# Patient Record
Sex: MALE | Race: WHITE | NOT HISPANIC OR LATINO | Employment: OTHER | URBAN - METROPOLITAN AREA
[De-identification: names, ages, dates, MRNs, and addresses within clinical notes are randomized per-mention and may not be internally consistent; named-entity substitution may affect disease eponyms.]

---

## 2017-10-02 ENCOUNTER — OFFICE VISIT (OUTPATIENT)
Dept: ORTHOPEDICS | Facility: CLINIC | Age: 62
End: 2017-10-02
Payer: MEDICARE

## 2017-10-02 VITALS — HEIGHT: 71 IN | BODY MASS INDEX: 29.26 KG/M2 | WEIGHT: 209 LBS

## 2017-10-02 DIAGNOSIS — M25.562 PAIN IN BOTH KNEES, UNSPECIFIED CHRONICITY: Primary | ICD-10-CM

## 2017-10-02 DIAGNOSIS — M25.561 PAIN IN BOTH KNEES, UNSPECIFIED CHRONICITY: Primary | ICD-10-CM

## 2017-10-02 DIAGNOSIS — M17.11 ARTHRITIS OF KNEE, RIGHT: Primary | ICD-10-CM

## 2017-10-02 PROCEDURE — 99203 OFFICE O/P NEW LOW 30 MIN: CPT | Mod: S$GLB,,, | Performed by: ORTHOPAEDIC SURGERY

## 2017-10-02 RX ORDER — ASPIRIN AND DIPYRIDAMOLE 25; 200 MG/1; MG/1
1 CAPSULE, EXTENDED RELEASE ORAL 2 TIMES DAILY
COMMUNITY
Start: 2017-09-29 | End: 2018-03-28

## 2017-10-02 RX ORDER — PROPAFENONE HYDROCHLORIDE 150 MG/1
1 TABLET, COATED ORAL 2 TIMES DAILY
COMMUNITY
Start: 2017-08-08 | End: 2018-03-28

## 2017-10-02 RX ORDER — ESCITALOPRAM OXALATE 5 MG/1
1 TABLET ORAL DAILY
COMMUNITY
Start: 2017-09-25 | End: 2018-03-28

## 2017-10-02 RX ORDER — ATORVASTATIN CALCIUM 40 MG/1
40 TABLET, FILM COATED ORAL DAILY
COMMUNITY
End: 2022-08-02 | Stop reason: SDUPTHER

## 2017-10-02 RX ORDER — OMEPRAZOLE 20 MG/1
1 CAPSULE, DELAYED RELEASE ORAL DAILY
COMMUNITY
Start: 2017-09-29 | End: 2018-04-25

## 2017-10-08 NOTE — PROGRESS NOTES
Past Medical History:   Diagnosis Date    Depression     Hypertension        History reviewed. No pertinent surgical history.    Current Outpatient Prescriptions   Medication Sig    atorvastatin (LIPITOR) 40 MG tablet Take 40 mg by mouth once daily.    dipyridamole-aspirin 200-25 mg (AGGRENOX)  mg CM12 Take 1 capsule by mouth 2 (two) times daily.    escitalopram oxalate (LEXAPRO) 5 MG Tab Take 1 tablet by mouth once daily.    omeprazole (PRILOSEC) 20 MG capsule Take 1 capsule by mouth once daily.    propafenone (RHTHYMOL) 150 MG Tab Take 1 tablet by mouth 2 (two) times daily.     No current facility-administered medications for this visit.        Review of patient's allergies indicates:   Allergen Reactions    Pcn [penicillins]        History reviewed. No pertinent family history.    Social History     Social History    Marital status:      Spouse name: N/A    Number of children: N/A    Years of education: N/A     Occupational History    Not on file.     Social History Main Topics    Smoking status: Never Smoker    Smokeless tobacco: Never Used    Alcohol use Not on file    Drug use: Unknown    Sexual activity: Not on file     Other Topics Concern    Not on file     Social History Narrative    No narrative on file       Chief Complaint:   Chief Complaint   Patient presents with    Right Knee - Pain       Consulting Physician: Self, Aaareferral    History of present illness:    This is a 62 y.o. year old male who complains of right knee pain 5/10. Worse with use. Has had steroid and visco injections with a little improvement.    Review of Systems:    Constitution: Denies chills, fever, and sweats.  HENT: Denies headaches or blurry vision.  Cardiovascular: Denies chest pain or irregular heart beat.  Respiratory: Denies cough or shortness of breath.  Gastrointestinal: Denies abdominal pain, nausea, or vomiting.  Musculoskeletal:  Denies muscle cramps.  Neurological: Denies dizziness or  "focal weakness.  Psychiatric/Behavioral: Normal mental status.  Hematologic/Lymphatic: Denies bleeding problem or easy bruising/bleeding.  Skin: Denies rash or suspicious lesions.    Examination:    Vital Signs:    Vitals:    10/02/17 0943   Weight: 94.8 kg (209 lb)   Height: 5' 11" (1.803 m)   PainSc:   5   PainLoc: Knee       Body mass index is 29.15 kg/m².    This a well-developed, well nourished patient in no acute distress.    Alert and oriented x 3 and cooperative to examination.       Physical Exam: Right Knee Exam    Gait   Antalgic    Skin  Rash:   None  Scars:   None    Inspection  Erythema:  None  Bruising:  None  Effusion:  None  Masses:  None  Lymphadenopathy: None    Range of Motion: 0 to 130°    Medial Joint : y  Lateral Joint : n    Patellofemoral Tenderness: Yes  Patellofemoral Crepitus: Yes    Lachman:  Normal  Anterior Drawer: Normal  Posterior Drawer: Normal    Jerrica's:  Negative  Apley's:  Negative    Varus Stress:  Stable  Valgus Stress:  Stable    Strength:  5/5    Pulses:  Palpable  Sensation:  Intact          Imaging: X-rays ordered and reviewed today personally of the right knee shows degenerative changes consistent with OA.        Assessment: Arthritis of knee, right        Plan:  We discussed options and at this time he has elected to see how his knee does.      DISCLAIMER: This note may have been dictated using voice recognition software and may contain grammatical errors.     NOTE: Consult report sent to referring provider via [facility] EMR.  "

## 2018-03-28 PROBLEM — I10 ESSENTIAL HYPERTENSION: Status: ACTIVE | Noted: 2018-03-28

## 2018-03-28 PROBLEM — I48.20 CHRONIC ATRIAL FIBRILLATION: Status: ACTIVE | Noted: 2018-03-28

## 2018-03-28 PROBLEM — M15.0 PRIMARY OSTEOARTHRITIS INVOLVING MULTIPLE JOINTS: Status: ACTIVE | Noted: 2018-03-28

## 2018-03-28 PROBLEM — M15.9 PRIMARY OSTEOARTHRITIS INVOLVING MULTIPLE JOINTS: Status: ACTIVE | Noted: 2018-03-28

## 2018-03-28 PROBLEM — E78.00 HYPERCHOLESTEREMIA: Status: ACTIVE | Noted: 2018-03-28

## 2018-03-28 PROBLEM — Z95.0 PACEMAKER: Status: ACTIVE | Noted: 2018-03-28

## 2018-04-26 ENCOUNTER — HOSPITAL ENCOUNTER (OUTPATIENT)
Dept: RADIOLOGY | Facility: HOSPITAL | Age: 63
Discharge: HOME | End: 2018-04-26
Attending: NURSE PRACTITIONER
Payer: MEDICARE

## 2018-04-26 DIAGNOSIS — R17 ELEVATED BILIRUBIN: ICD-10-CM

## 2018-04-26 DIAGNOSIS — R74.8 ELEVATED ALKALINE PHOSPHATASE LEVEL: ICD-10-CM

## 2018-04-26 PROCEDURE — 76700 US EXAM ABDOM COMPLETE: CPT | Mod: TC

## 2018-04-26 PROCEDURE — 76700 US EXAM ABDOM COMPLETE: CPT | Mod: 26,,, | Performed by: RADIOLOGY

## 2018-04-30 PROBLEM — R13.19 ESOPHAGEAL DYSPHAGIA: Status: ACTIVE | Noted: 2018-04-30

## 2018-04-30 PROBLEM — R17 ELEVATED BILIRUBIN: Status: ACTIVE | Noted: 2018-04-30

## 2018-04-30 PROBLEM — R93.2 ABNORMAL ULTRASOUND OF GALLBLADDER: Status: ACTIVE | Noted: 2018-04-30

## 2018-04-30 PROBLEM — K59.00 CONSTIPATION: Status: ACTIVE | Noted: 2018-04-30

## 2018-04-30 PROBLEM — K21.9 GASTROESOPHAGEAL REFLUX DISEASE: Status: ACTIVE | Noted: 2018-04-30

## 2018-05-22 PROBLEM — R93.2 ABNORMAL ULTRASOUND OF GALLBLADDER: Status: RESOLVED | Noted: 2018-04-30 | Resolved: 2018-05-22

## 2018-06-19 ENCOUNTER — HOSPITAL ENCOUNTER (EMERGENCY)
Facility: HOSPITAL | Age: 63
Discharge: HOME | End: 2018-06-19
Attending: FAMILY MEDICINE
Payer: MEDICARE

## 2018-06-19 VITALS
BODY MASS INDEX: 31.08 KG/M2 | HEIGHT: 71 IN | RESPIRATION RATE: 20 BRPM | TEMPERATURE: 99 F | HEART RATE: 85 BPM | DIASTOLIC BLOOD PRESSURE: 80 MMHG | SYSTOLIC BLOOD PRESSURE: 142 MMHG | OXYGEN SATURATION: 94 % | WEIGHT: 222 LBS

## 2018-06-19 DIAGNOSIS — R11.10 NON-INTRACTABLE VOMITING, PRESENCE OF NAUSEA NOT SPECIFIED, UNSPECIFIED VOMITING TYPE: Primary | ICD-10-CM

## 2018-06-19 LAB
ALBUMIN SERPL BCP-MCNC: 3.5 G/DL
ALP SERPL-CCNC: 141 U/L
ALT SERPL W/O P-5'-P-CCNC: 30 U/L
ANION GAP SERPL CALC-SCNC: 7 MMOL/L
AST SERPL-CCNC: 23 U/L
BASOPHILS # BLD AUTO: 0.02 K/UL
BASOPHILS NFR BLD: 0.3 %
BILIRUB SERPL-MCNC: 0.9 MG/DL
BUN SERPL-MCNC: 18 MG/DL
CALCIUM SERPL-MCNC: 8.6 MG/DL
CHLORIDE SERPL-SCNC: 104 MMOL/L
CO2 SERPL-SCNC: 26 MMOL/L
CREAT SERPL-MCNC: 0.9 MG/DL
DIFFERENTIAL METHOD: ABNORMAL
EOSINOPHIL # BLD AUTO: 0 K/UL
EOSINOPHIL NFR BLD: 0.1 %
ERYTHROCYTE [DISTWIDTH] IN BLOOD BY AUTOMATED COUNT: 12.7 %
EST. GFR  (AFRICAN AMERICAN): >60 ML/MIN/1.73 M^2
EST. GFR  (NON AFRICAN AMERICAN): >60 ML/MIN/1.73 M^2
GLUCOSE SERPL-MCNC: 109 MG/DL
HCT VFR BLD AUTO: 40.1 %
HGB BLD-MCNC: 13.9 G/DL
IMM GRANULOCYTES # BLD AUTO: 0.02 K/UL
IMM GRANULOCYTES NFR BLD AUTO: 0.3 %
LYMPHOCYTES # BLD AUTO: 0.5 K/UL
LYMPHOCYTES NFR BLD: 7.3 %
MCH RBC QN AUTO: 29.8 PG
MCHC RBC AUTO-ENTMCNC: 34.7 G/DL
MCV RBC AUTO: 86 FL
MONOCYTES # BLD AUTO: 1 K/UL
MONOCYTES NFR BLD: 12.8 %
NEUTROPHILS # BLD AUTO: 5.9 K/UL
NEUTROPHILS NFR BLD: 79.2 %
NRBC BLD-RTO: 0 /100 WBC
PLATELET # BLD AUTO: 119 K/UL
PMV BLD AUTO: 9.8 FL
POTASSIUM SERPL-SCNC: 3.9 MMOL/L
PROT SERPL-MCNC: 6 G/DL
RBC # BLD AUTO: 4.67 M/UL
SODIUM SERPL-SCNC: 137 MMOL/L
WBC # BLD AUTO: 7.42 K/UL

## 2018-06-19 PROCEDURE — 96374 THER/PROPH/DIAG INJ IV PUSH: CPT

## 2018-06-19 PROCEDURE — 99284 EMERGENCY DEPT VISIT MOD MDM: CPT | Mod: 25

## 2018-06-19 PROCEDURE — 96361 HYDRATE IV INFUSION ADD-ON: CPT

## 2018-06-19 PROCEDURE — 80053 COMPREHEN METABOLIC PANEL: CPT

## 2018-06-19 PROCEDURE — 25000003 PHARM REV CODE 250: Performed by: FAMILY MEDICINE

## 2018-06-19 PROCEDURE — 63600175 PHARM REV CODE 636 W HCPCS: Performed by: FAMILY MEDICINE

## 2018-06-19 PROCEDURE — 85025 COMPLETE CBC W/AUTO DIFF WBC: CPT

## 2018-06-19 RX ORDER — ONDANSETRON 4 MG/1
4 TABLET, FILM COATED ORAL EVERY 8 HOURS PRN
Qty: 12 TABLET | Refills: 0 | Status: SHIPPED | OUTPATIENT
Start: 2018-06-19 | End: 2018-11-13

## 2018-06-19 RX ORDER — ONDANSETRON 2 MG/ML
4 INJECTION INTRAMUSCULAR; INTRAVENOUS
Status: COMPLETED | OUTPATIENT
Start: 2018-06-19 | End: 2018-06-19

## 2018-06-19 RX ADMIN — SODIUM CHLORIDE 500 ML: 0.9 INJECTION, SOLUTION INTRAVENOUS at 01:06

## 2018-06-19 RX ADMIN — ONDANSETRON 4 MG: 2 INJECTION INTRAMUSCULAR; INTRAVENOUS at 01:06

## 2018-06-19 NOTE — DISCHARGE INSTRUCTIONS
Clear liquids only for the next 24 hours, then you can advance your diet as you can tolerate it. Return to the ER if you are unable to tolerate fluids or foods. Follow up with Dr. Cerna in 4-5 days, sooner if you dont feel better.

## 2018-06-19 NOTE — ED PROVIDER NOTES
"Encounter Date: 6/19/2018       History     Chief Complaint   Patient presents with    Fatigue     States feeling fatigued and generalized weak. States sleeping in longer than normal.    Generalized Body Aches     X 4 days.    Nausea     Continuous nausea; without vomiting. Also complaining of decreased appetite.    Headache     Left sided; pressure sensation; only when changing positions     Pt states for the past couple days has had weakness and body aches. Denies fever or chills. States he had nausea last pm but no vomiting. States he has not eaten nor drank anything since last night. No recent sick contacts.           Review of patient's allergies indicates:   Allergen Reactions    Pcn [penicillins] Hives     Past Medical History:   Diagnosis Date    Depression     Glaucoma     Hypercholesteremia     Hypertension     Stroke      Past Surgical History:   Procedure Laterality Date    ATRIAL CARDIAC PACEMAKER INSERTION      CARDIAC CATHETERIZATION  10/25/2017    all arteries "patent"    CARDIAC PACEMAKER PLACEMENT  03/2017    CARDIAC PACEMAKER PLACEMENT      COLONOSCOPY  2013    Dr. Goldstein     No family history on file.  Social History   Substance Use Topics    Smoking status: Never Smoker    Smokeless tobacco: Never Used    Alcohol use Yes      Comment: Rarely     Review of Systems   Constitutional: Positive for fatigue.   HENT: Negative.    Eyes: Negative.    Respiratory: Negative.    Cardiovascular: Negative.    Gastrointestinal: Positive for nausea.   Endocrine: Negative.    Genitourinary: Negative.    Musculoskeletal: Negative.    Neurological: Positive for weakness.   Hematological: Negative.    Psychiatric/Behavioral: Negative.        Physical Exam     Initial Vitals [06/19/18 1251]   BP Pulse Resp Temp SpO2   (!) 144/80 79 20 99.3 °F (37.4 °C) 98 %      MAP       --         Physical Exam    Nursing note and vitals reviewed.  Constitutional: He appears well-developed and well-nourished. He is " not diaphoretic. No distress.   HENT:   Head: Normocephalic and atraumatic.   Eyes: Pupils are equal, round, and reactive to light.   Neck: Normal range of motion. Neck supple.   Cardiovascular: Normal rate, regular rhythm, normal heart sounds and intact distal pulses. Exam reveals no gallop and no friction rub.    No murmur heard.  Pulmonary/Chest: Breath sounds normal. No respiratory distress. He has no wheezes. He has no rhonchi. He has no rales. He exhibits no tenderness.   Abdominal: Soft. Bowel sounds are normal. He exhibits no distension. There is no tenderness. There is no rebound and no guarding.   Musculoskeletal: Normal range of motion. He exhibits no edema.   Neurological: He is alert and oriented to person, place, and time. He has normal strength.   Skin: Skin is warm and dry. Capillary refill takes less than 2 seconds.   Psychiatric: He has a normal mood and affect. His behavior is normal. Judgment and thought content normal.         ED Course   Procedures  Labs Reviewed   CBC W/ AUTO DIFFERENTIAL   COMPREHENSIVE METABOLIC PANEL          Imaging Results    None                    Labs Reviewed   CBC W/ AUTO DIFFERENTIAL - Abnormal; Notable for the following:        Result Value    Hemoglobin 13.9 (*)     Platelets 119 (*)     Lymph # 0.5 (*)     Gran% 79.2 (*)     Lymph% 7.3 (*)     All other components within normal limits   COMPREHENSIVE METABOLIC PANEL - Abnormal; Notable for the following:     Calcium 8.6 (*)     Alkaline Phosphatase 141 (*)     Anion Gap 7 (*)     All other components within normal limits              ED Course as of Jun 19 1439   Tue Jun 19, 2018   1408 Awaiting lab results  [MD]   1439 Pt feeling much better, will attempt po challenge and if tolerates will discharge.   [MD]      ED Course User Index  [MD] Lydia Fitch MD     Clinical Impression:   The encounter diagnosis was Non-intractable vomiting, presence of nausea not specified, unspecified vomiting type.                              Lydia Fitch MD  06/19/18 6723

## 2018-10-01 ENCOUNTER — TELEPHONE (OUTPATIENT)
Dept: PODIATRY | Facility: CLINIC | Age: 63
End: 2018-10-01

## 2018-10-01 NOTE — TELEPHONE ENCOUNTER
----- Message from Debbie Rahman sent at 10/1/2018  7:56 AM CDT -----  Pt is requesting a same day appointment / had injured foot / slipped and feels like he could have broken his toe ... Call [phone] (home)

## 2018-10-02 ENCOUNTER — HOSPITAL ENCOUNTER (OUTPATIENT)
Dept: RADIOLOGY | Facility: HOSPITAL | Age: 63
Discharge: HOME | End: 2018-10-02
Attending: PODIATRIST
Payer: MEDICARE

## 2018-10-02 ENCOUNTER — OFFICE VISIT (OUTPATIENT)
Dept: PODIATRY | Facility: CLINIC | Age: 63
End: 2018-10-02
Payer: MEDICARE

## 2018-10-02 ENCOUNTER — TELEPHONE (OUTPATIENT)
Dept: PODIATRY | Facility: CLINIC | Age: 63
End: 2018-10-02

## 2018-10-02 VITALS
BODY MASS INDEX: 32.76 KG/M2 | SYSTOLIC BLOOD PRESSURE: 165 MMHG | HEART RATE: 73 BPM | TEMPERATURE: 98 F | WEIGHT: 234 LBS | HEIGHT: 71 IN | DIASTOLIC BLOOD PRESSURE: 95 MMHG

## 2018-10-02 DIAGNOSIS — S99.921A INJURY, FOOT, RIGHT, INITIAL ENCOUNTER: ICD-10-CM

## 2018-10-02 DIAGNOSIS — S92.314A CLOSED NONDISPLACED FRACTURE OF FIRST METATARSAL BONE OF RIGHT FOOT, INITIAL ENCOUNTER: Primary | ICD-10-CM

## 2018-10-02 DIAGNOSIS — M89.9 SESAMOID PAIN: ICD-10-CM

## 2018-10-02 DIAGNOSIS — S92.314A CLOSED NONDISPLACED FRACTURE OF FIRST METATARSAL BONE OF RIGHT FOOT, INITIAL ENCOUNTER: ICD-10-CM

## 2018-10-02 PROCEDURE — 73630 X-RAY EXAM OF FOOT: CPT | Mod: TC,FY,RT

## 2018-10-02 PROCEDURE — 99213 OFFICE O/P EST LOW 20 MIN: CPT | Mod: PBBFAC,25,PN | Performed by: PODIATRIST

## 2018-10-02 PROCEDURE — 99999 PR PBB SHADOW E&M-EST. PATIENT-LVL III: CPT | Mod: PBBFAC,,, | Performed by: PODIATRIST

## 2018-10-02 PROCEDURE — 73630 X-RAY EXAM OF FOOT: CPT | Mod: 26,RT,, | Performed by: RADIOLOGY

## 2018-10-02 PROCEDURE — 99214 OFFICE O/P EST MOD 30 MIN: CPT | Mod: S$PBB,,, | Performed by: PODIATRIST

## 2018-10-02 NOTE — TELEPHONE ENCOUNTER
----- Message from Nilay Cox DPM sent at 10/2/2018 12:55 PM CDT -----  Call and advise Xray doesn't show fx of bone but one of the spurs around the big toe joint fractured it best that he wear the boot for the next 2 weeks until I see him for F/U give him appt for 2 weeks. TY

## 2018-10-02 NOTE — TELEPHONE ENCOUNTER
Advised pt of xray results and tx plan. Pt verbalized understanding but would like for you to call him directly when you are finished with pt's. He would not tell me what this was concerning.

## 2018-10-02 NOTE — TELEPHONE ENCOUNTER
I told him that and he states if he is a good doctor he will call his pt back at their request. He will not tell me what he wants. Advised him he could ask at f/u appt if he was not going to tell me what his questions were so I could adivse the doctor and he hung up

## 2018-10-07 NOTE — PROGRESS NOTES
Subjective:       Patient ID: Elmer Cunha is a 63 y.o. male.    Chief Complaint: Follow-up; Foot Pain; and Foot Problem    Patient presents today with an injury to his right foot he states that while walking in his shed he slipped rolling his toes underneath his foot on the right side he states this happened last Thursday the pain has gotten better but still very swollen and tender and he is concerned that he may have broken something.  Patient states he did take Tylenol he did ice and elevate the area.  HPI  Review of Systems   Musculoskeletal: Positive for arthralgias, gait problem and joint swelling.   All other systems reviewed and are negative.      Objective:      Physical Exam   Constitutional: He appears well-developed and well-nourished.   Cardiovascular:   Pulses:       Dorsalis pedis pulses are 1+ on the right side, and 1+ on the left side.        Posterior tibial pulses are 1+ on the right side, and 1+ on the left side.   Musculoskeletal: He exhibits edema and tenderness.        Right foot: There is decreased range of motion.   Feet:   Right Foot:   Protective Sensation: 4 sites tested. 4 sites sensed.   Skin Integrity: Positive for erythema and warmth.   Left Foot:   Protective Sensation: 4 sites tested. 4 sites sensed.   Neurological: He is alert.   Skin: Skin is warm. Capillary refill takes 2 to 3 seconds.   Psychiatric: He has a normal mood and affect. His behavior is normal. Judgment and thought content normal.   Nursing note and vitals reviewed.      On evaluation patient has considerable erythema and edema of the right forefoot the patient has pain surrounding the 1st and 2nd ray there is significant discomfort noted in the sesamoid region right inflammation is surrounding the 1st MPJ with very limited palpation and range of motion due to discomfort there is concern for possible fracture in this area.  Patient has increased sensitivity in these areas with noted inflammation.  No skin breaks no  active signs of infection noted.  Assessment:       1. Closed nondisplaced fracture of first metatarsal bone of right foot, initial encounter    2. Sesamoid pain    3. Injury, foot, right, initial encounter        Plan:         Following extensive evaluation patient had an injury last Thursday where he slipped and his shed rolling the toes on his right foot underneath this foot this is been very swollen red and painful ever since that time very sensitive it has been difficult for the patient to wear shoe he has taken Tylenol he has iced and elevate while at has gotten a little bit better he still having significant pain upon weight-bearing.  Following examination I have concern for possible fracture to include the metatarsals or sesamoid bones I have ordered x-rays on the area the patient is going to go get x-rays taken we will contact the patient once I have had a chance to review the x-rays however I am recommending putting the patient in a fracture boot either way with or without a fracture I feel it would be best for the patient to offload pressure from the area allow it to settle down.  I have recommended continued ice elevation.   Patient will get the boot and start using it at all times of weight-bearing immediately I will plan to see the patient for follow-up pending the x-ray results I did subsequently evaluate the x-ray there was no obvious sign fracture of the metatarsals or sesamoid my moist however there is severe degenerative arthritic changes encompassing the 1st MPJ there is a fractured portion of the spur at the dorsal aspect of the 1st metatarsal head right.  Patient was contacted advised there is no metatarsal fracture or sesamoid fracture however there is a fractured spur overlying the big toe joint I advised the patient that it is likely that the spur fracture with this injury at this point I do want the patient to continue to wear the fracture boot as ordered.  Ice and elevation recommended my  nurse had contacted the patient advising the patient as to the results of the x-ray patient subsequently on the phone upon the patient and did not make a follow-up indicating he felt that the doctor should be contacting him directly not the nurse.  Total face-to-face time including discussion evaluation treatment equaled 25 min.

## 2018-10-15 ENCOUNTER — TELEPHONE (OUTPATIENT)
Dept: PODIATRY | Facility: CLINIC | Age: 63
End: 2018-10-15

## 2018-10-15 NOTE — TELEPHONE ENCOUNTER
----- Message from Abida Reyes sent at 10/15/2018 10:25 AM CDT -----  Contact: patient  Patient was supposed to be scheduled for a 2 week follow up ([place] office). Requesting to be seen this week. Please call [phone]

## 2018-10-23 ENCOUNTER — OFFICE VISIT (OUTPATIENT)
Dept: PODIATRY | Facility: CLINIC | Age: 63
End: 2018-10-23
Payer: MEDICARE

## 2018-10-23 VITALS
HEART RATE: 78 BPM | TEMPERATURE: 98 F | BODY MASS INDEX: 32.2 KG/M2 | HEIGHT: 71 IN | DIASTOLIC BLOOD PRESSURE: 85 MMHG | WEIGHT: 230 LBS | SYSTOLIC BLOOD PRESSURE: 162 MMHG

## 2018-10-23 DIAGNOSIS — M19.079 OSTEOARTHRITIS OF ANKLE AND FOOT, UNSPECIFIED LATERALITY: Primary | ICD-10-CM

## 2018-10-23 DIAGNOSIS — M20.21 HALLUX RIGIDUS OF RIGHT FOOT: ICD-10-CM

## 2018-10-23 PROCEDURE — 99999 PR PBB SHADOW E&M-EST. PATIENT-LVL IV: CPT | Mod: PBBFAC,,, | Performed by: PODIATRIST

## 2018-10-23 PROCEDURE — 99214 OFFICE O/P EST MOD 30 MIN: CPT | Mod: S$PBB,,, | Performed by: PODIATRIST

## 2018-10-23 PROCEDURE — 99214 OFFICE O/P EST MOD 30 MIN: CPT | Mod: PBBFAC,PN | Performed by: PODIATRIST

## 2018-10-23 RX ORDER — METOPROLOL TARTRATE 25 MG/1
TABLET, FILM COATED ORAL
Refills: 3 | Status: ON HOLD | COMMUNITY
Start: 2018-08-17 | End: 2019-02-13 | Stop reason: HOSPADM

## 2018-10-23 RX ORDER — AMLODIPINE BESYLATE 5 MG/1
5 TABLET ORAL DAILY
Refills: 11 | COMMUNITY
Start: 2018-10-09

## 2018-10-23 RX ORDER — TRAVOPROST 0.04 MG/ML
SOLUTION/ DROPS OPHTHALMIC
Refills: 3 | COMMUNITY
Start: 2018-10-09 | End: 2020-02-26

## 2018-10-23 RX ORDER — ROPINIROLE 0.5 MG/1
TABLET, FILM COATED ORAL
Refills: 0 | Status: ON HOLD | COMMUNITY
Start: 2018-10-15 | End: 2018-11-16 | Stop reason: CLARIF

## 2018-10-24 ENCOUNTER — TELEPHONE (OUTPATIENT)
Dept: PODIATRY | Facility: CLINIC | Age: 63
End: 2018-10-24

## 2018-10-24 NOTE — TELEPHONE ENCOUNTER
----- Message from Stefany Shieldsza sent at 10/24/2018  4:02 PM CDT -----  Contact: self [phone]  He is having sharp pain in his right foot.  He wants to know if you could prescribe pain medication.  He also wants to know if you could possibly perform his surgery sooner than 11/15.  Please call him.  Thank you!

## 2018-10-24 NOTE — TELEPHONE ENCOUNTER
C/o sharp pain big toe and feels like it runs across all its toes then up his whole right leg. He states the pain has been extremely bad today. Wants to know if you can do surgery on 11/8 or 11/9.  Can you call him something in for pain?  Uses [facility].

## 2018-10-25 ENCOUNTER — TELEPHONE (OUTPATIENT)
Dept: PODIATRY | Facility: CLINIC | Age: 63
End: 2018-10-25

## 2018-10-25 RX ORDER — HYDROCODONE BITARTRATE AND ACETAMINOPHEN 5; 325 MG/1; MG/1
1 TABLET ORAL 3 TIMES DAILY
Qty: 42 TABLET | Refills: 0 | Status: SHIPPED | OUTPATIENT
Start: 2018-10-25 | End: 2018-11-08

## 2018-10-25 NOTE — TELEPHONE ENCOUNTER
----- Message from Silvana Alfaro sent at 10/25/2018 12:37 PM CDT -----  Contact: self  Patient has questions about the way the prescription of Norco was dispensed and charged. Please call patient at [phone]. Thanks!

## 2018-10-25 NOTE — TELEPHONE ENCOUNTER
Spoke to pt he states he had to pay for half of the pain med rx that was sent to pharmacy. Advised pt it may be because insurance would only pay for a portion of the rx. Pt states that is what the pharmacy advised him.

## 2018-10-25 NOTE — TELEPHONE ENCOUNTER
Advised patient no earlier surgery date is available. Advised of pain medication sent to [facility].  Patient verbalized understanding.

## 2018-10-26 ENCOUNTER — TELEPHONE (OUTPATIENT)
Dept: PODIATRY | Facility: CLINIC | Age: 63
End: 2018-10-26

## 2018-10-26 NOTE — TELEPHONE ENCOUNTER
Pt is still in a lot of pain. He said the pain medicine makes him woozy and then sleepy.  He complains about it shooting all over his first and then up his leg. He doesn't want anymore pain medicine but does want to have surgery sooner.  If someone cancels can he get bumped up? Advised pt I would mention it again to you.

## 2018-10-26 NOTE — TELEPHONE ENCOUNTER
----- Message from Kunal Oakes sent at 10/26/2018 10:49 AM CDT -----  Contact: pt  Pt is requesting to reschedule their appointment.    Date of current appointment: 11.16.18  Reason for reschedule: pt in to much pain / states meds arent working  Additional information: pt is requesting to move the surgery up as soon as possible    Call back: [phone]  thanks

## 2018-10-28 NOTE — PROGRESS NOTES
Subjective:       Patient ID: Elmer Cunha is a 63 y.o. male.    Chief Complaint: No chief complaint on file.      Patient presents today for follow-up injury of his big toe joint he states he still has severe pain and discomfort even though the boot does help he still having a lot of discomfort and he is getting a shooting sensation up his big toe up his foot into his leg.  Patient presents today to discuss previous x-ray results.  HPI  Review of Systems   Musculoskeletal: Positive for arthralgias, gait problem and joint swelling.   All other systems reviewed and are negative.      Objective:      Physical Exam   Constitutional: He appears well-developed and well-nourished.   Cardiovascular:   Pulses:       Dorsalis pedis pulses are 1+ on the right side, and 1+ on the left side.        Posterior tibial pulses are 1+ on the right side, and 1+ on the left side.   Musculoskeletal: He exhibits edema and tenderness.        Right foot: There is decreased range of motion.   Feet:   Right Foot:   Protective Sensation: 4 sites tested. 4 sites sensed.   Skin Integrity: Positive for erythema and warmth.   Left Foot:   Protective Sensation: 4 sites tested. 4 sites sensed.   Neurological: He is alert.   Skin: Skin is warm. Capillary refill takes 2 to 3 seconds.   Psychiatric: He has a normal mood and affect. His behavior is normal. Judgment and thought content normal.   Nursing note and vitals reviewed.      On evaluation patient has considerable erythema and edema of the right forefoot the patient has pain surrounding the 1st and 2nd ray there is significant discomfort noted in the sesamoid region right inflammation is surrounding the 1st MPJ with very limited palpation and range of motion due to discomfort.  Patient has increased sensitivity in these areas with noted inflammation.  No skin breaks no active signs of infection noted.  Assessment:       1. Osteoarthritis of ankle and foot, unspecified laterality    2. Hallux  rigidus of right foot        Plan:           Following evaluation x-rays reviewed in detail and discussed in detail with the patient today patient has significant degenerative changes encompassing the 1st MPJ of the patient's right foot there are fractured spurs overlying the dorsal aspect of the joint likely fractured at the most recent injury which is cause continued inflammation severe pain in the right foot patient was dispensed samples of Flector patch today to use over the area to settle down the inflammation I want to stay in the fracture boot at this time.  I did discuss treatment options with the patient I discussed a steroid injection however I do not feel this is going to give the patient the amount of relief that he is going need.  I discussed surgical intervention of a fusion of the 1st MPJ versus doing an arthroplasty I am recommending the arthroplasty procedure this is a less invasive less aggressive procedure I believe this will provide the patient significant relief from his discomfort with a shorter recovery patient had also indicated he would like to have a nail avulsion of the right great toe at the same time because the nail has become loose and is lifting off he is afraid he is going to catch it on something and ripped it off and cause an infection I have advised him certainly we can do this at the very same time we have scheduled the patient for surgery for November 16th patient is seeing his cardiologist next week and will have to get medical clearance from his cardiologist prior to surgery.  Patient understands this I will see the pre patient for a preoperative consultation prior to surgery.  Subsequent to today's visit patient contacted us stating he is still having severe pain and discomfort I did did give him a prescription for Norco 5 mg 3 times a day as needed.  Total face-to-face time including discussion evaluation treatment and surgical consultation equaled 30 min.

## 2018-10-29 NOTE — TELEPHONE ENCOUNTER
"Per our last conversation patient stated he didn't want gabapentin because he "saw what it did to his wife".  And I told him last time that we would put him on a cancellation list. If he calls back, I will tell him again.   "

## 2018-11-02 ENCOUNTER — TELEPHONE (OUTPATIENT)
Dept: PODIATRY | Facility: CLINIC | Age: 63
End: 2018-11-02

## 2018-11-02 NOTE — TELEPHONE ENCOUNTER
Pt advised that we haven't received it yet.  He stated he had a copy.  Will verify Monday if the office has gotten it yet or not.

## 2018-11-02 NOTE — TELEPHONE ENCOUNTER
----- Message from Tabitha Zambrano sent at 11/2/2018 12:38 PM CDT -----  Type: Needs Medical Advice    Who Called: Patient    Best Call Back Number: [phone] (home)     Additional Information: Wanted to verify if office received medical clearance from his cardiologist from [facility]

## 2018-11-05 ENCOUNTER — TELEPHONE (OUTPATIENT)
Dept: PODIATRY | Facility: CLINIC | Age: 63
End: 2018-11-05

## 2018-11-05 NOTE — TELEPHONE ENCOUNTER
----- Message from Sherley Grande sent at 11/5/2018  2:04 PM CST -----  Contact: Patient  Patient would like to speak with someone regarding his upcoming pre-op appointment.  Call Back#[phone]  Thanks

## 2018-11-13 ENCOUNTER — LAB VISIT (OUTPATIENT)
Dept: LAB | Facility: HOSPITAL | Age: 63
End: 2018-11-13
Attending: PODIATRIST
Payer: MEDICARE

## 2018-11-13 ENCOUNTER — OFFICE VISIT (OUTPATIENT)
Dept: PODIATRY | Facility: CLINIC | Age: 63
End: 2018-11-13
Payer: MEDICARE

## 2018-11-13 VITALS
HEIGHT: 71 IN | HEART RATE: 83 BPM | WEIGHT: 230 LBS | TEMPERATURE: 97 F | SYSTOLIC BLOOD PRESSURE: 129 MMHG | BODY MASS INDEX: 32.2 KG/M2 | DIASTOLIC BLOOD PRESSURE: 79 MMHG

## 2018-11-13 DIAGNOSIS — M20.21 HALLUX RIGIDUS OF RIGHT FOOT: Primary | ICD-10-CM

## 2018-11-13 DIAGNOSIS — Z01.818 PRE-OP EXAM: ICD-10-CM

## 2018-11-13 DIAGNOSIS — M19.071 OSTEOARTHRITIS OF RIGHT ANKLE AND FOOT: ICD-10-CM

## 2018-11-13 DIAGNOSIS — L60.0 INGROWN NAIL: ICD-10-CM

## 2018-11-13 LAB
ALBUMIN SERPL BCP-MCNC: 4 G/DL
ALP SERPL-CCNC: 158 U/L
ALT SERPL W/O P-5'-P-CCNC: 23 U/L
ANION GAP SERPL CALC-SCNC: 6 MMOL/L
AST SERPL-CCNC: 18 U/L
BASOPHILS # BLD AUTO: 0.03 K/UL
BASOPHILS NFR BLD: 0.4 %
BILIRUB SERPL-MCNC: 1.2 MG/DL
BUN SERPL-MCNC: 16 MG/DL
CALCIUM SERPL-MCNC: 9.6 MG/DL
CHLORIDE SERPL-SCNC: 107 MMOL/L
CO2 SERPL-SCNC: 28 MMOL/L
CREAT SERPL-MCNC: 0.8 MG/DL
DIFFERENTIAL METHOD: ABNORMAL
EOSINOPHIL # BLD AUTO: 0.1 K/UL
EOSINOPHIL NFR BLD: 1.2 %
ERYTHROCYTE [DISTWIDTH] IN BLOOD BY AUTOMATED COUNT: 12.6 %
EST. GFR  (AFRICAN AMERICAN): >60 ML/MIN/1.73 M^2
EST. GFR  (NON AFRICAN AMERICAN): >60 ML/MIN/1.73 M^2
GLUCOSE SERPL-MCNC: 102 MG/DL
HCT VFR BLD AUTO: 43.5 %
HGB BLD-MCNC: 14.7 G/DL
IMM GRANULOCYTES # BLD AUTO: 0.02 K/UL
IMM GRANULOCYTES NFR BLD AUTO: 0.3 %
LYMPHOCYTES # BLD AUTO: 1.2 K/UL
LYMPHOCYTES NFR BLD: 14.8 %
MCH RBC QN AUTO: 29.1 PG
MCHC RBC AUTO-ENTMCNC: 33.8 G/DL
MCV RBC AUTO: 86 FL
MONOCYTES # BLD AUTO: 0.7 K/UL
MONOCYTES NFR BLD: 9.4 %
NEUTROPHILS # BLD AUTO: 5.7 K/UL
NEUTROPHILS NFR BLD: 73.9 %
NRBC BLD-RTO: 0 /100 WBC
PLATELET # BLD AUTO: 168 K/UL
PMV BLD AUTO: 10.2 FL
POTASSIUM SERPL-SCNC: 4.2 MMOL/L
PROT SERPL-MCNC: 6.6 G/DL
RBC # BLD AUTO: 5.06 M/UL
SODIUM SERPL-SCNC: 141 MMOL/L
WBC # BLD AUTO: 7.75 K/UL

## 2018-11-13 PROCEDURE — 36415 COLL VENOUS BLD VENIPUNCTURE: CPT

## 2018-11-13 PROCEDURE — 99213 OFFICE O/P EST LOW 20 MIN: CPT | Mod: PBBFAC,PN | Performed by: PODIATRIST

## 2018-11-13 PROCEDURE — 99215 OFFICE O/P EST HI 40 MIN: CPT | Mod: S$PBB,,, | Performed by: PODIATRIST

## 2018-11-13 PROCEDURE — 80053 COMPREHEN METABOLIC PANEL: CPT

## 2018-11-13 PROCEDURE — 99999 PR PBB SHADOW E&M-EST. PATIENT-LVL III: CPT | Mod: PBBFAC,,, | Performed by: PODIATRIST

## 2018-11-13 PROCEDURE — 85025 COMPLETE CBC W/AUTO DIFF WBC: CPT

## 2018-11-13 RX ORDER — OXYCODONE AND ACETAMINOPHEN 5; 325 MG/1; MG/1
2 TABLET ORAL
Qty: 84 TABLET | Refills: 0 | Status: SHIPPED | OUTPATIENT
Start: 2018-11-13 | End: 2018-11-20

## 2018-11-13 RX ORDER — SULFAMETHOXAZOLE AND TRIMETHOPRIM 400; 80 MG/1; MG/1
2 TABLET ORAL 2 TIMES DAILY
Qty: 56 TABLET | Refills: 0 | Status: SHIPPED | OUTPATIENT
Start: 2018-11-13 | End: 2018-11-27

## 2018-11-13 RX ORDER — ONDANSETRON 4 MG/1
4 TABLET, FILM COATED ORAL EVERY 8 HOURS PRN
Qty: 30 TABLET | Refills: 2 | Status: SHIPPED | OUTPATIENT
Start: 2018-11-13 | End: 2018-11-23

## 2018-11-13 NOTE — H&P (VIEW-ONLY)
Subjective:       Patient ID: Elmer Cunha is a 63 y.o. male.    Chief Complaint:     Patient presents today for follow-up injury of his big toe joint he states he still has severe pain and discomfort even though the boot does help he still having a lot of discomfort and he is getting a shooting sensation up his big toe up his foot into his leg.    Patient is also complaining about a painful ingrown toenail on his right great toe.  Foot Pain   Associated symptoms include arthralgias and joint swelling.     Review of Systems   Musculoskeletal: Positive for arthralgias, gait problem and joint swelling.   All other systems reviewed and are negative.      Objective:      Physical Exam   Constitutional: He appears well-developed and well-nourished.   Cardiovascular: Normal rate, regular rhythm and normal heart sounds.   Pulses:       Dorsalis pedis pulses are 1+ on the right side, and 1+ on the left side.        Posterior tibial pulses are 1+ on the right side, and 1+ on the left side.   Pulmonary/Chest: Effort normal and breath sounds normal.   Musculoskeletal: He exhibits edema and tenderness.        Right foot: There is decreased range of motion.   Feet:   Right Foot:   Protective Sensation: 4 sites tested. 4 sites sensed.   Skin Integrity: Positive for erythema and warmth.   Left Foot:   Protective Sensation: 4 sites tested. 4 sites sensed.   Neurological: He is alert.   Skin: Skin is warm. Capillary refill takes 2 to 3 seconds.   Psychiatric: He has a normal mood and affect. His behavior is normal. Judgment and thought content normal.   Nursing note and vitals reviewed.      On evaluation patient has considerable erythema and edema of the right forefoot the patient has pain surrounding the 1st and 2nd ray there is significant discomfort noted in the sesamoid region right inflammation is surrounding the 1st MPJ with very limited palpation and range of motion due to discomfort.  Patient has increased sensitivity in  these areas with noted inflammation.  No skin breaks no active signs of infection noted.  Patient is noted to have an ingrowing toenail secondary to fungal involvement with early signs of infection on the patient's right hallux tenderness and discomfort is noted patient has additional severe pain surrounding the 1st MPJ right range of motion is severely limited due to degenerative changes and pain.  Assessment:       1. Hallux rigidus of right foot    2. Osteoarthritis of right ankle and foot    3. Ingrown nail    4. Pre-op exam        Plan:           Following evaluation x-rays reviewed in detail and discussed in detail with the patient today patient has significant degenerative changes encompassing the 1st MPJ of the patient's right foot there are fractured spurs overlying the dorsal aspect of the joint likely fractured at the most recent injury which is cause continued inflammation severe pain in the right foot.     Patient states he is not able to bear weight at all on less he is wearing the fracture boot on his right foot.  Patient has received medical clearance from his cardiologist and indicates today he would like to proceed with surgery to address the arthritis surrounding the big toe joint on his right foot. Patient presents today for preoperative history and physical in discussion all aspects of surgery were discussed with the patient no guarantees were given written or implied all potential complications including but not limited to delayed healing nonhealing postoperative pain infection recurrence were discussed with the patient in detail. All aspect of the patient's recovery time involved in recovery patient responsibility is involving recovery were also discussed in detail. Patient advised failure to comply with postoperative care will jeopardize surgical outcome.   All aspects of surgery for an arthroplasty of the 1st MPJ for hallux rigidus right foot was discussed in detail patient understands his  weight-bearing will be limited over the next 3-4 weeks he will be put in a postoperative shoe immediately following surgery and will need to ice and elevate around the clock to control inflammation I have also discussed a total nail avulsion of the right hallux due to a chronically ingrown toenail.  Following discussion patient was in agreement with this consent was signed I did discuss with the patient a fusion of the 1st MPJ however I have advised him that is much more aggressive with a much longer recovery I feel the patient will get significant pain relief with an arthroplasty of the 1st MPJ.  Patient has received cardiac clearance this evening will be his last dose of Eliquis until after surgery the patient does have a cardiac pacemaker.  X-rays were reviewed today patient has significant spurring with fractured spur surrounding the 1st MPJ right this will be addressed surgically to increased range of motion decreased pain surrounding the 1st MPJ right.  A complete preoperative evaluation was performed today including x-ray exam patient had preoperative lab work ordered this will be reviewed prior to surgery patient was dispensed prescriptions for Percocet Bactrim and Zofran to be taken as directed postoperatively.  Patient had question as to whether not he would need home health following surgery he indicated he would prefer [facility] if necessary.  Patient's surgery has been scheduled for to soak November 16, 2018 he has been advised to contact us with any problems questions or concerns prior to surgery for arthroplasty of the 1st MPJ secondary to hallux rigidus with a total nail avulsion right hallux.  Total face-to-face time including discussion evaluation treatment review of x-rays and decision for surgery equaled 60 min.    This note was created using [employer] voice recognition software that occasionally misinterpreted phrases or words.

## 2018-11-13 NOTE — PROGRESS NOTES
Subjective:       Patient ID: Elmer Cunha is a 63 y.o. male.    Chief Complaint:     Patient presents today for follow-up injury of his big toe joint he states he still has severe pain and discomfort even though the boot does help he still having a lot of discomfort and he is getting a shooting sensation up his big toe up his foot into his leg.    Patient is also complaining about a painful ingrown toenail on his right great toe.  Foot Pain   Associated symptoms include arthralgias and joint swelling.     Review of Systems   Musculoskeletal: Positive for arthralgias, gait problem and joint swelling.   All other systems reviewed and are negative.      Objective:      Physical Exam   Constitutional: He appears well-developed and well-nourished.   Cardiovascular: Normal rate, regular rhythm and normal heart sounds.   Pulses:       Dorsalis pedis pulses are 1+ on the right side, and 1+ on the left side.        Posterior tibial pulses are 1+ on the right side, and 1+ on the left side.   Pulmonary/Chest: Effort normal and breath sounds normal.   Musculoskeletal: He exhibits edema and tenderness.        Right foot: There is decreased range of motion.   Feet:   Right Foot:   Protective Sensation: 4 sites tested. 4 sites sensed.   Skin Integrity: Positive for erythema and warmth.   Left Foot:   Protective Sensation: 4 sites tested. 4 sites sensed.   Neurological: He is alert.   Skin: Skin is warm. Capillary refill takes 2 to 3 seconds.   Psychiatric: He has a normal mood and affect. His behavior is normal. Judgment and thought content normal.   Nursing note and vitals reviewed.      On evaluation patient has considerable erythema and edema of the right forefoot the patient has pain surrounding the 1st and 2nd ray there is significant discomfort noted in the sesamoid region right inflammation is surrounding the 1st MPJ with very limited palpation and range of motion due to discomfort.  Patient has increased sensitivity in  these areas with noted inflammation.  No skin breaks no active signs of infection noted.  Patient is noted to have an ingrowing toenail secondary to fungal involvement with early signs of infection on the patient's right hallux tenderness and discomfort is noted patient has additional severe pain surrounding the 1st MPJ right range of motion is severely limited due to degenerative changes and pain.  Assessment:       1. Hallux rigidus of right foot    2. Osteoarthritis of right ankle and foot    3. Ingrown nail    4. Pre-op exam        Plan:           Following evaluation x-rays reviewed in detail and discussed in detail with the patient today patient has significant degenerative changes encompassing the 1st MPJ of the patient's right foot there are fractured spurs overlying the dorsal aspect of the joint likely fractured at the most recent injury which is cause continued inflammation severe pain in the right foot.     Patient states he is not able to bear weight at all on less he is wearing the fracture boot on his right foot.  Patient has received medical clearance from his cardiologist and indicates today he would like to proceed with surgery to address the arthritis surrounding the big toe joint on his right foot. Patient presents today for preoperative history and physical in discussion all aspects of surgery were discussed with the patient no guarantees were given written or implied all potential complications including but not limited to delayed healing nonhealing postoperative pain infection recurrence were discussed with the patient in detail. All aspect of the patient's recovery time involved in recovery patient responsibility is involving recovery were also discussed in detail. Patient advised failure to comply with postoperative care will jeopardize surgical outcome.   All aspects of surgery for an arthroplasty of the 1st MPJ for hallux rigidus right foot was discussed in detail patient understands his  weight-bearing will be limited over the next 3-4 weeks he will be put in a postoperative shoe immediately following surgery and will need to ice and elevate around the clock to control inflammation I have also discussed a total nail avulsion of the right hallux due to a chronically ingrown toenail.  Following discussion patient was in agreement with this consent was signed I did discuss with the patient a fusion of the 1st MPJ however I have advised him that is much more aggressive with a much longer recovery I feel the patient will get significant pain relief with an arthroplasty of the 1st MPJ.  Patient has received cardiac clearance this evening will be his last dose of Eliquis until after surgery the patient does have a cardiac pacemaker.  X-rays were reviewed today patient has significant spurring with fractured spur surrounding the 1st MPJ right this will be addressed surgically to increased range of motion decreased pain surrounding the 1st MPJ right.  A complete preoperative evaluation was performed today including x-ray exam patient had preoperative lab work ordered this will be reviewed prior to surgery patient was dispensed prescriptions for Percocet Bactrim and Zofran to be taken as directed postoperatively.  Patient had question as to whether not he would need home health following surgery he indicated he would prefer [facility] if necessary.  Patient's surgery has been scheduled for to soak November 16, 2018 he has been advised to contact us with any problems questions or concerns prior to surgery for arthroplasty of the 1st MPJ secondary to hallux rigidus with a total nail avulsion right hallux.  Total face-to-face time including discussion evaluation treatment review of x-rays and decision for surgery equaled 60 min.    This note was created using [employer] voice recognition software that occasionally misinterpreted phrases or words.

## 2018-11-13 NOTE — PATIENT INSTRUCTIONS
Nothing to eat or drink after midnight.  If you take medication for high blood pressure take this in the morning with a sip of water prior to surgery.     Arrive at out patient registration at 6:30 am

## 2018-11-14 ENCOUNTER — TELEPHONE (OUTPATIENT)
Dept: PODIATRY | Facility: CLINIC | Age: 63
End: 2018-11-14

## 2018-11-14 RX ORDER — SODIUM CHLORIDE, SODIUM LACTATE, POTASSIUM CHLORIDE, CALCIUM CHLORIDE 600; 310; 30; 20 MG/100ML; MG/100ML; MG/100ML; MG/100ML
INJECTION, SOLUTION INTRAVENOUS CONTINUOUS
Status: CANCELLED | OUTPATIENT
Start: 2018-11-16

## 2018-11-14 NOTE — TELEPHONE ENCOUNTER
----- Message from Quin Escobedo sent at 11/14/2018  1:06 PM CST -----  Contact: self [phone]  States that he needs to speak to nurse if he can take his reg meds on the day of surgery. Please call back at [phone]//thank you acc

## 2018-11-16 ENCOUNTER — ANESTHESIA (OUTPATIENT)
Dept: SURGERY | Facility: HOSPITAL | Age: 63
End: 2018-11-16
Payer: MEDICARE

## 2018-11-16 ENCOUNTER — ANESTHESIA EVENT (OUTPATIENT)
Dept: SURGERY | Facility: HOSPITAL | Age: 63
End: 2018-11-16
Payer: MEDICARE

## 2018-11-16 ENCOUNTER — HOSPITAL ENCOUNTER (OUTPATIENT)
Facility: HOSPITAL | Age: 63
Discharge: HOME | End: 2018-11-16
Attending: PODIATRIST | Admitting: PODIATRIST
Payer: MEDICARE

## 2018-11-16 VITALS
OXYGEN SATURATION: 98 % | HEART RATE: 70 BPM | RESPIRATION RATE: 34 BRPM | DIASTOLIC BLOOD PRESSURE: 96 MMHG | TEMPERATURE: 98 F | SYSTOLIC BLOOD PRESSURE: 127 MMHG

## 2018-11-16 DIAGNOSIS — L60.0 INGROWN NAIL: ICD-10-CM

## 2018-11-16 DIAGNOSIS — M19.071 OSTEOARTHRITIS OF RIGHT ANKLE AND FOOT: ICD-10-CM

## 2018-11-16 DIAGNOSIS — Z01.818 PRE-OP EXAM: ICD-10-CM

## 2018-11-16 DIAGNOSIS — M20.21 HALLUX RIGIDUS OF RIGHT FOOT: ICD-10-CM

## 2018-11-16 DIAGNOSIS — M19.079 OSTEOARTHRITIS OF ANKLE AND FOOT, UNSPECIFIED LATERALITY: ICD-10-CM

## 2018-11-16 PROCEDURE — 36000708 HC OR TIME LEV III 1ST 15 MIN: Performed by: PODIATRIST

## 2018-11-16 PROCEDURE — 37000008 HC ANESTHESIA 1ST 15 MINUTES: Performed by: PODIATRIST

## 2018-11-16 PROCEDURE — 63600175 PHARM REV CODE 636 W HCPCS: Performed by: ANESTHESIOLOGY

## 2018-11-16 PROCEDURE — 28289 CORRJ HALUX RIGDUS W/O IMPLT: CPT | Mod: T5,,, | Performed by: PODIATRIST

## 2018-11-16 PROCEDURE — D9220A PRA ANESTHESIA: Mod: CRNA,,, | Performed by: NURSE ANESTHETIST, CERTIFIED REGISTERED

## 2018-11-16 PROCEDURE — 71000015 HC POSTOP RECOV 1ST HR: Performed by: PODIATRIST

## 2018-11-16 PROCEDURE — 11730 AVULSION NAIL PLATE SIMPLE 1: CPT | Mod: 59,T5,, | Performed by: PODIATRIST

## 2018-11-16 PROCEDURE — 93005 ELECTROCARDIOGRAM TRACING: CPT

## 2018-11-16 PROCEDURE — 25000003 PHARM REV CODE 250: Performed by: PODIATRIST

## 2018-11-16 PROCEDURE — 25000003 PHARM REV CODE 250: Performed by: NURSE ANESTHETIST, CERTIFIED REGISTERED

## 2018-11-16 PROCEDURE — D9220A PRA ANESTHESIA: Mod: ANES,,, | Performed by: ANESTHESIOLOGY

## 2018-11-16 PROCEDURE — 27201423 OPTIME MED/SURG SUP & DEVICES STERILE SUPPLY: Performed by: PODIATRIST

## 2018-11-16 PROCEDURE — 36000709 HC OR TIME LEV III EA ADD 15 MIN: Performed by: PODIATRIST

## 2018-11-16 PROCEDURE — 37000009 HC ANESTHESIA EA ADD 15 MINS: Performed by: PODIATRIST

## 2018-11-16 PROCEDURE — 63600175 PHARM REV CODE 636 W HCPCS: Performed by: NURSE ANESTHETIST, CERTIFIED REGISTERED

## 2018-11-16 PROCEDURE — 71000033 HC RECOVERY, INTIAL HOUR: Performed by: PODIATRIST

## 2018-11-16 RX ORDER — LIDOCAINE HYDROCHLORIDE 10 MG/ML
1 INJECTION, SOLUTION EPIDURAL; INFILTRATION; INTRACAUDAL; PERINEURAL ONCE
Status: DISCONTINUED | OUTPATIENT
Start: 2018-11-16 | End: 2018-11-16 | Stop reason: HOSPADM

## 2018-11-16 RX ORDER — EPHEDRINE SULFATE 50 MG/ML
INJECTION, SOLUTION INTRAVENOUS
Status: DISCONTINUED | OUTPATIENT
Start: 2018-11-16 | End: 2018-11-16

## 2018-11-16 RX ORDER — BUPIVACAINE HYDROCHLORIDE 5 MG/ML
INJECTION, SOLUTION PERINEURAL
Status: DISCONTINUED | OUTPATIENT
Start: 2018-11-16 | End: 2018-11-16 | Stop reason: HOSPADM

## 2018-11-16 RX ORDER — PROPOFOL 10 MG/ML
VIAL (ML) INTRAVENOUS
Status: DISCONTINUED | OUTPATIENT
Start: 2018-11-16 | End: 2018-11-16

## 2018-11-16 RX ORDER — CLINDAMYCIN PHOSPHATE 150 MG/ML
600 INJECTION, SOLUTION INTRAVENOUS
Status: DISCONTINUED | OUTPATIENT
Start: 2018-11-16 | End: 2018-11-16 | Stop reason: HOSPADM

## 2018-11-16 RX ORDER — CLINDAMYCIN PHOSPHATE 600 MG/50ML
600 INJECTION, SOLUTION INTRAVENOUS
Status: DISCONTINUED | OUTPATIENT
Start: 2018-11-16 | End: 2018-11-16

## 2018-11-16 RX ORDER — ONDANSETRON 2 MG/ML
4 INJECTION INTRAMUSCULAR; INTRAVENOUS DAILY PRN
Status: DISCONTINUED | OUTPATIENT
Start: 2018-11-16 | End: 2018-11-16 | Stop reason: HOSPADM

## 2018-11-16 RX ORDER — BACITRACIN 500 [USP'U]/G
OINTMENT TOPICAL
Status: DISCONTINUED | OUTPATIENT
Start: 2018-11-16 | End: 2018-11-16 | Stop reason: HOSPADM

## 2018-11-16 RX ORDER — MIDAZOLAM HYDROCHLORIDE 1 MG/ML
INJECTION, SOLUTION INTRAMUSCULAR; INTRAVENOUS
Status: DISCONTINUED | OUTPATIENT
Start: 2018-11-16 | End: 2018-11-16

## 2018-11-16 RX ORDER — SODIUM CHLORIDE, SODIUM LACTATE, POTASSIUM CHLORIDE, CALCIUM CHLORIDE 600; 310; 30; 20 MG/100ML; MG/100ML; MG/100ML; MG/100ML
INJECTION, SOLUTION INTRAVENOUS CONTINUOUS
Status: DISCONTINUED | OUTPATIENT
Start: 2018-11-16 | End: 2018-11-16 | Stop reason: HOSPADM

## 2018-11-16 RX ORDER — ONDANSETRON 2 MG/ML
INJECTION INTRAMUSCULAR; INTRAVENOUS
Status: DISCONTINUED | OUTPATIENT
Start: 2018-11-16 | End: 2018-11-16

## 2018-11-16 RX ORDER — MORPHINE SULFATE 2 MG/ML
2 INJECTION, SOLUTION INTRAMUSCULAR; INTRAVENOUS EVERY 5 MIN PRN
Status: DISCONTINUED | OUTPATIENT
Start: 2018-11-16 | End: 2018-11-16 | Stop reason: HOSPADM

## 2018-11-16 RX ORDER — SODIUM CHLORIDE 9 MG/ML
INJECTION, SOLUTION INTRAVENOUS CONTINUOUS PRN
Status: DISCONTINUED | OUTPATIENT
Start: 2018-11-16 | End: 2018-11-16

## 2018-11-16 RX ORDER — CLINDAMYCIN PHOSPHATE 600 MG/50ML
INJECTION, SOLUTION INTRAVENOUS
Status: DISCONTINUED
Start: 2018-11-16 | End: 2018-11-16 | Stop reason: HOSPADM

## 2018-11-16 RX ORDER — LIDOCAINE HYDROCHLORIDE 10 MG/ML
INJECTION INFILTRATION; PERINEURAL
Status: DISCONTINUED | OUTPATIENT
Start: 2018-11-16 | End: 2018-11-16 | Stop reason: HOSPADM

## 2018-11-16 RX ORDER — FENTANYL CITRATE 50 UG/ML
INJECTION, SOLUTION INTRAMUSCULAR; INTRAVENOUS
Status: DISCONTINUED | OUTPATIENT
Start: 2018-11-16 | End: 2018-11-16

## 2018-11-16 RX ORDER — SODIUM CHLORIDE, SODIUM LACTATE, POTASSIUM CHLORIDE, CALCIUM CHLORIDE 600; 310; 30; 20 MG/100ML; MG/100ML; MG/100ML; MG/100ML
125 INJECTION, SOLUTION INTRAVENOUS CONTINUOUS
Status: DISCONTINUED | OUTPATIENT
Start: 2018-11-16 | End: 2018-11-16 | Stop reason: HOSPADM

## 2018-11-16 RX ORDER — KETOROLAC TROMETHAMINE 30 MG/ML
15 INJECTION, SOLUTION INTRAMUSCULAR; INTRAVENOUS ONCE
Status: CANCELLED | OUTPATIENT
Start: 2018-11-16 | End: 2018-11-16

## 2018-11-16 RX ADMIN — EPHEDRINE SULFATE 25 MG: 50 INJECTION INTRAMUSCULAR; INTRAVENOUS; SUBCUTANEOUS at 08:11

## 2018-11-16 RX ADMIN — ONDANSETRON 4 MG: 2 INJECTION INTRAMUSCULAR; INTRAVENOUS at 09:11

## 2018-11-16 RX ADMIN — FENTANYL CITRATE 100 MCG: 50 INJECTION, SOLUTION INTRAMUSCULAR; INTRAVENOUS at 07:11

## 2018-11-16 RX ADMIN — EPHEDRINE SULFATE 15 MG: 50 INJECTION INTRAMUSCULAR; INTRAVENOUS; SUBCUTANEOUS at 08:11

## 2018-11-16 RX ADMIN — ONDANSETRON 4 MG: 2 INJECTION INTRAMUSCULAR; INTRAVENOUS at 08:11

## 2018-11-16 RX ADMIN — PROPOFOL 200 MG: 10 INJECTION, EMULSION INTRAVENOUS at 07:11

## 2018-11-16 RX ADMIN — SODIUM CHLORIDE: 0.9 INJECTION, SOLUTION INTRAVENOUS at 07:11

## 2018-11-16 RX ADMIN — SODIUM CHLORIDE, POTASSIUM CHLORIDE, SODIUM LACTATE AND CALCIUM CHLORIDE 125 ML: 600; 310; 30; 20 INJECTION, SOLUTION INTRAVENOUS at 06:11

## 2018-11-16 RX ADMIN — EPHEDRINE SULFATE 10 MG: 50 INJECTION INTRAMUSCULAR; INTRAVENOUS; SUBCUTANEOUS at 08:11

## 2018-11-16 RX ADMIN — MIDAZOLAM HYDROCHLORIDE 2 MG: 1 INJECTION, SOLUTION INTRAMUSCULAR; INTRAVENOUS at 07:11

## 2018-11-16 NOTE — PLAN OF CARE
Call to [employer] regarding pt pacemaker, no magnet needed for this surgery. May have one need by if pauses are seen may use if needed. Spoke with Dimitri LOPES and anesthesia staff to relate information

## 2018-11-16 NOTE — ANESTHESIA PREPROCEDURE EVALUATION
11/16/2018  Elmer Cunha is a 63 y.o., male.    Pre-op Assessment    I have reviewed the Patient Summary Reports.    I have reviewed the Nursing Notes.   I have reviewed the Medications.     Review of Systems  Anesthesia Hx:  No problems with previous Anesthesia  Neg history of prior surgery. Denies Family Hx of Anesthesia complications.   Denies Personal Hx of Anesthesia complications.   Social:  Non-Smoker    Hematology/Oncology:  Hematology Normal   Oncology Normal     EENT/Dental:EENT/Dental Normal   Cardiovascular:   Hypertension ECG has been reviewed.            Cardiac pacermaker   Pulmonary:  Pulmonary Normal    Renal/:  Renal/ Normal     Hepatic/GI:   GERD, well controlled    Musculoskeletal:  Musculoskeletal Normal    Neurological:  Neurology Normal    Endocrine:  Endocrine Normal    Dermatological:  Skin Normal    Psych:  Psychiatric Normal           Physical Exam  General:  Well nourished    Airway/Jaw/Neck:  AIRWAY FINDINGS: Normal      Eyes/Ears/Nose:  EYES/EARS/NOSE FINDINGS: Normal   Dental:  DENTAL FINDINGS: Normal   Chest/Lungs:  Chest/Lungs Clear    Heart/Vascular:  Heart Findings: Normal Heart murmur: negative Vascular Findings: Normal    Abdomen:  Abdomen Findings: Normal    Musculoskeletal:  Musculoskeletal Findings: Normal   Skin:  Skin Findings: Normal         Anesthesia Plan  Type of Anesthesia, risks & benefits discussed:  Anesthesia Type:  general  Patient's Preference:   Intra-op Monitoring Plan: standard ASA monitors  Intra-op Monitoring Plan Comments:   Post Op Pain Control Plan:   Post Op Pain Control Plan Comments:   Induction:   IV  Beta Blocker:  Patient is not currently on a Beta-Blocker (No further documentation required).       Informed Consent: Patient understands risks and agrees with Anesthesia plan.  Questions answered. Anesthesia consent signed with  patient.  ASA Score: 2     Day of Surgery Review of History & Physical:    H&P update referred to the provider.

## 2018-11-16 NOTE — DISCHARGE INSTRUCTIONS
Use ice applied to your surgical site 20 MIN on ,  And 20MIN off while awake. As often as possible.Place  Ice under your knee and on top of foot .   Minimal active today.   Always use your surgical boot while up.   Start your antibiotics this evening after dinner.   You may start you pain medications this morning or afternoon before pain is noted.   Please start slow try 1 pain pill wait one hour  before trying 2.    Make sure you take medication with food.

## 2018-11-16 NOTE — INTERVAL H&P NOTE
The patient has been examined and the H&P has been reviewed:    I concur with the findings and no changes have occurred since H&P was written.    Anesthesia/Surgery risks, benefits and alternative options discussed and understood by patient/family.          Active Hospital Problems    Diagnosis  POA    Hallux rigidus of right foot [M20.21]  Yes    Osteoarthritis of ankle and foot [M19.079]  Yes      Resolved Hospital Problems   No resolved problems to display.

## 2018-11-16 NOTE — TRANSFER OF CARE
Anesthesia Transfer of Care Note    Patient: Elmer Cunha    Procedure(s) Performed: Procedure(s) (LRB):  ARTHROPLASTY, FOOT (Right)    Patient location: PACU    Anesthesia Type: general    Transport from OR: Transported from OR on room air with adequate spontaneous ventilation    Post pain: adequate analgesia    Post assessment: no apparent anesthetic complications and tolerated procedure well    Post vital signs: stable    Level of consciousness: sedated and responds to stimulation    Nausea/Vomiting: no nausea/vomiting    Complications: none    Transfer of care protocol was followed      Last vitals:   Visit Vitals  BP (!) 144/90 (BP Location: Left arm, Patient Position: Sitting)   Pulse 70   Temp 36.6 °C (97.9 °F) (Oral)   Resp 10   SpO2 95%

## 2018-11-16 NOTE — BRIEF OP NOTE
[facility] - Periop Services  Brief Operative Note     SUMMARY     Surgery Date: 11/16/2018     Surgeon(s) and Role:     * Nilay Cox DPM - Primary    Assisting Surgeon: None    Pre-op Diagnosis:  Osteoarthritis of ankle and foot, unspecified laterality [M19.079]  Hallux rigidus of right foot [M20.21]    Post-op Diagnosis:  Post-Op Diagnosis Codes:     * Osteoarthritis of ankle and foot, unspecified laterality [M19.079]     * Hallux rigidus of right foot [M20.21]    Procedure(s) (LRB):  ARTHROPLASTY, FOOT (Right)    Anesthesia: Choice    Description of the findings of the procedure:     Findings/Key Components:     Estimated Blood Loss: * No values recorded between 11/16/2018  7:59 AM and 11/16/2018  8:48 AM *         Specimens:   Specimen (12h ago, onward)    None          Discharge Note    SUMMARY     Admit Date: 11/16/2018    Discharge Date and Time:  11/16/2018 8:52 AM    Hospital Course (synopsis of major diagnoses, care, treatment, and services provided during the course of the hospital stay):      Final Diagnosis: Post-Op Diagnosis Codes:     * Osteoarthritis of ankle and foot, unspecified laterality [M19.079]     * Hallux rigidus of right foot [M20.21]    Disposition: Home or Self Care    Follow Up/Patient Instructions:     Medications:  Reconciled Home Medications:      Medication List      CONTINUE taking these medications    amLODIPine 5 MG tablet  Commonly known as:  NORVASC     atorvastatin 40 MG tablet  Commonly known as:  LIPITOR  Take 40 mg by mouth once daily.     CORDARONE 200 MG Tab  Generic drug:  amiodarone  Take 200 mg by mouth once daily.     docusate sodium 100 MG capsule  Commonly known as:  COLACE  Take 1 capsule (100 mg total) by mouth 2 (two) times daily. With 12 ounces of water     ELIQUIS 5 mg Tab  Generic drug:  apixaban  Take 5 mg by mouth 2 (two) times daily.     escitalopram oxalate 20 MG tablet  Commonly known as:  LEXAPRO  TAKE 1 TABLET BY MOUTH EACH  "DAY FOR MOOD "THANK YOU"     metoprolol succinate 25 MG 24 hr tablet  Commonly known as:  TOPROL-XL  Take 25 mg by mouth once daily. Take 1/2 tablet by mouth twice daily.     metoprolol tartrate 25 MG tablet  Commonly known as:  LOPRESSOR     ondansetron 4 MG tablet  Commonly known as:  ZOFRAN  Take 1 tablet (4 mg total) by mouth every 8 (eight) hours as needed for Nausea.     oxyCODONE-acetaminophen 5-325 mg per tablet  Commonly known as:  PERCOCET  Take 2 tablets by mouth 6 (six) times daily. for 7 days     polyethylene glycol 17 gram/dose powder  Commonly known as:  GLYCOLAX  Take 17 g by mouth once daily.     ranitidine 150 MG tablet  Commonly known as:  ZANTAC  TAKE 1 TABLET BY MOUTH TWICE DAILY "THANK YOU"     sulfamethoxazole-trimethoprim 400-80mg 400-80 mg per tablet  Commonly known as:  BACTRIM,SEPTRA  Take 2 tablets by mouth 2 (two) times daily. for 14 days     tamsulosin 0.4 mg Cap  Commonly known as:  FLOMAX  Take 2 capsules (0.8 mg total) by mouth once daily.     TRAVATAN Z 0.004 % ophthalmic solution  Generic drug:  travoprost          Discharge Procedure Orders   POST-SURGICAL BOOT/SHOE FOR HOME USE     Order Specific Question Answer Comments   Height: 6'0"    Weight: 220    Quantity: 1    Size: xl    Length of need (1-99 months): 2      Keep surgical extremity elevated     Ice to affected area     Lifting restrictions     Weight bearing restrictions (specify):       "

## 2018-11-16 NOTE — OP NOTE
DATE OF PROCEDURE:  11/16/2018    SURGEON:  Nilay Cox DPM.    PREOPERATIVE DIAGNOSIS:  1.  Hallux rigidus first metatarsophalangeal joint, right.  2.  Paronychia, right hallux.    POSTOPERATIVE DIAGNOSIS:  1.  Hallux rigidus first metatarsophalangeal joint, right.  2.  Paronychia, right hallux.    PROCEDURE PERFORMED:  1.  Arthroplasty for hallux rigidus and capsular repositioning first MPJ  right.  Procedure code 73262.  2.  Nail avulsion secondary to chronically ingrown toenail, right hallux.   Procedure code 54039.    ANESTHESIA:  Monitored sedation in conjunction with local infiltrate  equaling 20 mL of 1% lidocaine plain.    HEMOSTASIS:  An ankle tourniquet placed at 250 mmHg for a period of 37  minutes.    ESTIMATED BLOOD LOSS:  Minimal.    MATERIALS USED:  Sterile irrigant.  Antibiotic ointment 3.0 Vicryl, 4.0  Vicryl, 4.0 Monocryl, Mastisol and Steri-Strips.    INJECTABLES:  30 mL of 0.5% Marcaine plain.    PATHOLOGY:  None.    CONDITION:  Stable.    COMPLICATIONS:  None.    PROCEDURE IN DETAILS:  The patient was taken to the Operating Room, placed  in supine position on the OR table.  Attention was then directed towards  the patient's right ankle where Webril cast padding was placed on the  patient's right ankle as well as an ankle tourniquet.  Following this, the  patient was locally anesthetized in a regional block of the first ray,  right foot, utilizing a total of 20 mL of 1% lidocaine plain.  After having  done this, the area was then prepped and draped in the usual sterile  manner.  Following this, intraoperative fluoroscopy was used to plan the  incision overlying the dorsal aspect of the first MPJ right.  Prior to  performing this incision, a nail avulsion was performed, the entire right  hallux nail was removed in toto.  There was noted to be significant  incurvation to both the medial and lateral borders with extensive fungal  involvement and hyperkeratotic tissue overlying the nail bed  itself.  This  was removed and resected in toto from the patient's right hallux.   Following this, attention was directed overlying the first MPJ right where  a longitudinal linear incision was created guided utilizing intraoperative  fluoroscopy.  This incision was carried down to the level of the capsule  layer, which was also incised in a longitudinal linear fashion.   Immediately upon making the incision through the capsule layer there was  noted to be extensive spurring both at the head of the first metatarsal  base of the proximal phalanx.  This was resected utilizing a sagittal saw.   There was also a large portion of fractured spur that was in the first  webspace and not directly attached to the joint.  This was carefully  dissected and removed.  Additional spurring was removed from the medial  aspect of the first MPJ right.  A handheld rotary bur was used to recontour  the base of the proximal phalanx.  Head of the first metatarsal.  There was  significant arthritic changes noted at the first MPJ; however, following  removal of the spurs and loose bodies, increased range of motion was noted  in this area.  The area was thoroughly flushed and irrigated with copious  amounts of sterile saline.  Deep closure was achieved with 3.0 Vicryl in a  continuous running fashion and a medial closure was achieved with 4.0  Vicryl in a simple interrupted fashion.  Skin closure was achieved with 4.0  Monocryl in a subcuticular fashion.  Mastisol and Steri-Strips were  applied.  The patient was injected with a total of 30 mL of 0.5% Marcaine  plain to create a long-term postoperative anesthetic.  Ankle tourniquet was  lowered and removed.  Minimal bleeding noted.  Antibiotic ointment was  applied to the nail bed, right hallux as was Adaptic nonadhesive dressing,  sterile 4 x 4's, ABD and multiple rolls of Kerlix were used to dress the  patient's right foot.  The patient left the Operating Room with vital signs  stable and  vascular status having returned to the patient's normal  preoperative levels.        JRB/HN dd: 11/16/2018 08:58:09 (CST)   td: 11/16/2018 09:50:44 (CST)  Doc ID #[id number]   Job ID #[id number]    CC:

## 2018-11-16 NOTE — ANESTHESIA POSTPROCEDURE EVALUATION
Anesthesia Post Evaluation    Patient: Elmer Cunha    Procedure(s) Performed: Procedure(s) (LRB):  ARTHROPLASTY, FOOT (Right)    Final Anesthesia Type: general  Patient location during evaluation: PACU  Patient participation: Yes- Able to Participate  Level of consciousness: awake and awake and alert  Post-procedure vital signs: reviewed and stable  Pain management: adequate  Airway patency: patent  PONV status at discharge: No PONV  Anesthetic complications: no      Cardiovascular status: blood pressure returned to baseline  Respiratory status: unassisted and spontaneous ventilation  Hydration status: euvolemic  Follow-up not needed.        Visit Vitals  BP (!) 127/96   Pulse 70   Temp 36.4 °C (97.6 °F)   Resp (!) 34   SpO2 98%       Pain/Bernardo Score: Pain Assessment Performed: Yes (11/16/2018  9:00 AM)  Presence of Pain: denies (11/16/2018  8:54 AM)  Bernardo Score: 10 (11/16/2018 10:00 AM)  Modified Bernardo Score: 20 (11/16/2018 10:00 AM)

## 2018-11-19 ENCOUNTER — TELEPHONE (OUTPATIENT)
Dept: PODIATRY | Facility: CLINIC | Age: 63
End: 2018-11-19

## 2018-11-19 NOTE — TELEPHONE ENCOUNTER
----- Message from Kyara Donahue sent at 11/19/2018 11:25 AM CST -----  Contact: Self  Patient states medication ondansetron (ZOFRAN) 4 MG tablet is not working and needs something  Else called in     Please call to advise [phone] (home)       [facility] - [place], [place] - [address] [facility]  [address]  [place] [address]  Phone: [phone] Fax: [phone]

## 2018-11-19 NOTE — TELEPHONE ENCOUNTER
Pt states he has been feeling nauseated since yesterday and has vomited twice this morning, doesn't think zofran is helping.  Pt states he has been eating something  Before taking ABT. Please advise.

## 2018-11-20 ENCOUNTER — OFFICE VISIT (OUTPATIENT)
Dept: PODIATRY | Facility: CLINIC | Age: 63
End: 2018-11-20
Payer: MEDICARE

## 2018-11-20 VITALS
HEIGHT: 71 IN | TEMPERATURE: 98 F | SYSTOLIC BLOOD PRESSURE: 153 MMHG | DIASTOLIC BLOOD PRESSURE: 75 MMHG | WEIGHT: 230 LBS | BODY MASS INDEX: 32.2 KG/M2 | HEART RATE: 84 BPM

## 2018-11-20 DIAGNOSIS — L60.0 INGROWN NAIL: Primary | ICD-10-CM

## 2018-11-20 DIAGNOSIS — M20.21 HALLUX RIGIDUS OF RIGHT FOOT: ICD-10-CM

## 2018-11-20 PROCEDURE — 99999 PR PBB SHADOW E&M-EST. PATIENT-LVL III: CPT | Mod: PBBFAC,,, | Performed by: PODIATRIST

## 2018-11-20 PROCEDURE — 99024 POSTOP FOLLOW-UP VISIT: CPT | Mod: POP,,, | Performed by: PODIATRIST

## 2018-11-20 PROCEDURE — 99213 OFFICE O/P EST LOW 20 MIN: CPT | Mod: PBBFAC,PN | Performed by: PODIATRIST

## 2018-11-20 RX ORDER — CLINDAMYCIN HYDROCHLORIDE 150 MG/1
150 CAPSULE ORAL EVERY 8 HOURS
Qty: 42 CAPSULE | Refills: 0 | Status: SHIPPED | OUTPATIENT
Start: 2018-11-20 | End: 2018-12-04

## 2018-11-24 NOTE — PROGRESS NOTES
"Elmer Cunha is a 63 y.o. male patient.   1. Ingrown nail    2. Hallux rigidus of right foot      Past Medical History:   Diagnosis Date    Depression     Glaucoma     Hypercholesteremia     Hypertension     Stroke      No past surgical history pertinent negatives on file.  Scheduled Meds:  Continuous Infusions:  PRN Meds:    Review of patient's allergies indicates:   Allergen Reactions    Pcn [penicillins] Hives     There are no hospital problems to display for this patient.    Blood pressure (!) 153/75, pulse 84, temperature 97.6 °F (36.4 °C), height 5' 11" (1.803 m), weight 104.3 kg (230 lb).    Subjective  Objective   Assessment & Plan     Patient presents status post nail avulsion and arthroplasty 1st MPJ right for hallux rigidus.  Patient is doing very well he states that the Bactrim has caused him to be nauseous even while taking the Zofran.  Patient states he has had little to no pain at all he has been icing and elevating as directed.  Following evaluation the patient's dressing was removed the surgical site looks very good it is healing very well patient has minimal inflammation considering his current postoperative status right.  New well-padded dry dressing was applied to the patient's right foot with a nonstick gauze overlying the nail avulsion site plan follow-up will be 1 week patient is to maintain very limited weight-bearing status only is absolutely needed and maintain weight on the patient's right heel not on the forefoot right.  Patient advised to contact me with any problems questions or concerns prior to follow-up.  Patient was advised to discontinue the Bactrim start taking clindamycin as directed.    Nilay Cox DPM  11/24/2018  "

## 2018-11-27 ENCOUNTER — OFFICE VISIT (OUTPATIENT)
Dept: PODIATRY | Facility: CLINIC | Age: 63
End: 2018-11-27
Payer: MEDICARE

## 2018-11-27 VITALS
SYSTOLIC BLOOD PRESSURE: 141 MMHG | BODY MASS INDEX: 32.2 KG/M2 | TEMPERATURE: 98 F | DIASTOLIC BLOOD PRESSURE: 82 MMHG | HEART RATE: 75 BPM | WEIGHT: 230 LBS | HEIGHT: 71 IN

## 2018-11-27 DIAGNOSIS — M19.071 OSTEOARTHRITIS OF RIGHT ANKLE AND FOOT: ICD-10-CM

## 2018-11-27 DIAGNOSIS — M20.21 HALLUX RIGIDUS OF RIGHT FOOT: Primary | ICD-10-CM

## 2018-11-27 PROCEDURE — 99999 PR PBB SHADOW E&M-EST. PATIENT-LVL III: CPT | Mod: PBBFAC,,, | Performed by: PODIATRIST

## 2018-11-27 PROCEDURE — 99213 OFFICE O/P EST LOW 20 MIN: CPT | Mod: PBBFAC,PN | Performed by: PODIATRIST

## 2018-11-27 PROCEDURE — 99024 POSTOP FOLLOW-UP VISIT: CPT | Mod: POP,,, | Performed by: PODIATRIST

## 2018-12-02 NOTE — PROGRESS NOTES
"Elmer Cunha is a 63 y.o. male patient.   1. Hallux rigidus of right foot    2. Osteoarthritis of right ankle and foot      Past Medical History:   Diagnosis Date    Depression     Glaucoma     Hypercholesteremia     Hypertension     Stroke      No past surgical history pertinent negatives on file.  Scheduled Meds:  Continuous Infusions:  PRN Meds:    Review of patient's allergies indicates:   Allergen Reactions    Pcn [penicillins] Hives     There are no hospital problems to display for this patient.    Blood pressure (!) 141/82, pulse 75, temperature 97.8 °F (36.6 °C), height 5' 11" (1.803 m), weight 104.3 kg (230 lb).    Subjective  Objective:  Vital signs (most recent): Blood pressure (!) 141/82, pulse 75, temperature 97.8 °F (36.6 °C), height 5' 11" (1.803 m), weight 104.3 kg (230 lb).     Assessment & Plan     Patient presents status post nail avulsion and arthroplasty 1st MPJ right for hallux rigidus.  Patient is doing very well he states that the Bactrim has caused him to be nauseous even while taking the Zofran.  Patient states he has had little to no pain at all he has been icing and elevating as directed.  Following evaluation the patient's dressing was removed the surgical site looks very good it is healing very well patient has minimal inflammation considering his current postoperative status right.  New well-padded dry dressing was applied to the patient's right foot with a nonstick gauze overlying the nail avulsion site plan follow-up will be 2 weeks patient is to maintain very limited weight-bearing status only is absolutely needed and maintain weight on the patient's right heel not on the forefoot right.  Patient advised to contact me with any problems questions or concerns prior to follow-up.  Patient advised everything looks good it is infection-free I am encouraging ice and elevation as necessary.  Plan follow-up will be 2 weeks patient advised I will consider putting him in and normal " loose-fitting shoe at that time after removing his sutures.  Patient indicated today changing his antibiotic made a big difference he has not had any problems with the clindamycin.  Follow-up 2 weeks.  Nilay Cox DPM  12/1/2018

## 2018-12-11 ENCOUNTER — HOSPITAL ENCOUNTER (OUTPATIENT)
Dept: RADIOLOGY | Facility: HOSPITAL | Age: 63
Discharge: HOME | End: 2018-12-11
Attending: NURSE PRACTITIONER
Payer: MEDICARE

## 2018-12-11 ENCOUNTER — OFFICE VISIT (OUTPATIENT)
Dept: PODIATRY | Facility: CLINIC | Age: 63
End: 2018-12-11
Payer: MEDICARE

## 2018-12-11 VITALS
HEART RATE: 82 BPM | HEIGHT: 71 IN | WEIGHT: 230 LBS | TEMPERATURE: 98 F | RESPIRATION RATE: 18 BRPM | BODY MASS INDEX: 32.2 KG/M2 | DIASTOLIC BLOOD PRESSURE: 76 MMHG | SYSTOLIC BLOOD PRESSURE: 142 MMHG

## 2018-12-11 DIAGNOSIS — Z86.73 HISTORY OF TIA (TRANSIENT ISCHEMIC ATTACK): ICD-10-CM

## 2018-12-11 DIAGNOSIS — R51.9 INTRACTABLE HEADACHE, UNSPECIFIED CHRONICITY PATTERN, UNSPECIFIED HEADACHE TYPE: ICD-10-CM

## 2018-12-11 DIAGNOSIS — I48.20 CHRONIC ATRIAL FIBRILLATION: ICD-10-CM

## 2018-12-11 DIAGNOSIS — M20.21 HALLUX RIGIDUS OF RIGHT FOOT: Primary | ICD-10-CM

## 2018-12-11 DIAGNOSIS — R47.89 WORD FINDING DIFFICULTY: ICD-10-CM

## 2018-12-11 DIAGNOSIS — H53.149 PHOTOPHOBIA: ICD-10-CM

## 2018-12-11 PROCEDURE — 99024 POSTOP FOLLOW-UP VISIT: CPT | Mod: POP,,, | Performed by: PODIATRIST

## 2018-12-11 PROCEDURE — 99999 PR PBB SHADOW E&M-EST. PATIENT-LVL III: CPT | Mod: PBBFAC,,, | Performed by: PODIATRIST

## 2018-12-11 PROCEDURE — 99213 OFFICE O/P EST LOW 20 MIN: CPT | Mod: PBBFAC,25,PN | Performed by: PODIATRIST

## 2018-12-16 NOTE — PROGRESS NOTES
"Elmer Cunha is a 63 y.o. male patient.   1. Hallux rigidus of right foot      Past Medical History:   Diagnosis Date    Depression     Glaucoma     Hypercholesteremia     Hypertension     Stroke      No past surgical history pertinent negatives on file.  Scheduled Meds:  Continuous Infusions:  PRN Meds:    Review of patient's allergies indicates:   Allergen Reactions    Pcn [penicillins] Hives     There are no hospital problems to display for this patient.    Blood pressure (!) 142/76, pulse 82, temperature 97.8 °F (36.6 °C), temperature source Oral, resp. rate 18, height 5' 11" (1.803 m), weight 104.3 kg (230 lb).    Subjective  Objective:  Vital signs (most recent): Blood pressure (!) 142/76, pulse 82, temperature 97.8 °F (36.6 °C), temperature source Oral, resp. rate 18, height 5' 11" (1.803 m), weight 104.3 kg (230 lb).     Assessment & Plan     Patient presents status post nail avulsion and arthroplasty 1st MPJ right for hallux rigidus.   Patient is doing very well he is not having any pain or discomfort I am going to allow the patient to transition into a normal shoe loose fitting as tolerated the incision looks good he can get the area wet however he is not to apply any cream or lotion to the area not to scrub the incision I did apply some Betadine to the incision after removing the Steri-Strips as well as a light dressing.  Patient will be seen for follow-up in 2 weeks he has been advised to contact us with any problems questions or concerns prior to follow-up.  Surgical site is infection free.  Nilay Cox DPM  12/15/2018  "

## 2018-12-18 PROBLEM — G47.61 PLMD (PERIODIC LIMB MOVEMENT DISORDER): Status: ACTIVE | Noted: 2018-12-18

## 2018-12-18 PROBLEM — Z86.73 HISTORY OF TIA (TRANSIENT ISCHEMIC ATTACK): Status: ACTIVE | Noted: 2018-12-18

## 2018-12-18 PROBLEM — G91.9 HYDROCEPHALUS IN ADULT: Status: ACTIVE | Noted: 2018-12-18

## 2018-12-18 PROBLEM — R45.89 DYSPHORIC MOOD: Status: ACTIVE | Noted: 2018-12-18

## 2018-12-18 PROBLEM — I67.9 SMALL VESSEL DISEASE, CEREBROVASCULAR: Status: ACTIVE | Noted: 2018-12-18

## 2018-12-27 ENCOUNTER — OFFICE VISIT (OUTPATIENT)
Dept: PODIATRY | Facility: CLINIC | Age: 63
End: 2018-12-27
Payer: MEDICARE

## 2018-12-27 VITALS
BODY MASS INDEX: 33.32 KG/M2 | WEIGHT: 238 LBS | DIASTOLIC BLOOD PRESSURE: 93 MMHG | RESPIRATION RATE: 18 BRPM | HEIGHT: 71 IN | OXYGEN SATURATION: 96 % | HEART RATE: 87 BPM | TEMPERATURE: 98 F | SYSTOLIC BLOOD PRESSURE: 163 MMHG

## 2018-12-27 DIAGNOSIS — M20.21 HALLUX RIGIDUS OF RIGHT FOOT: Primary | ICD-10-CM

## 2018-12-27 PROCEDURE — 99213 OFFICE O/P EST LOW 20 MIN: CPT | Mod: PBBFAC,PN | Performed by: PODIATRIST

## 2018-12-27 PROCEDURE — 99024 POSTOP FOLLOW-UP VISIT: CPT | Mod: POP,,, | Performed by: PODIATRIST

## 2018-12-27 PROCEDURE — 99999 PR PBB SHADOW E&M-EST. PATIENT-LVL III: CPT | Mod: PBBFAC,,, | Performed by: PODIATRIST

## 2018-12-27 NOTE — PROGRESS NOTES
"Elmer Cunha is a 63 y.o. male patient.   1. Hallux rigidus of right foot      Past Medical History:   Diagnosis Date    Depression     Glaucoma     Hypercholesteremia     Hypertension     Stroke      No past surgical history pertinent negatives on file.  Scheduled Meds:  Continuous Infusions:  PRN Meds:    Review of patient's allergies indicates:   Allergen Reactions    Pcn [penicillins] Hives     There are no hospital problems to display for this patient.    Blood pressure (!) 163/93, pulse 87, temperature 97.8 °F (36.6 °C), temperature source Oral, resp. rate 18, height 5' 11" (1.803 m), weight 108 kg (238 lb), SpO2 96 %.    Subjective  Objective:  Vital signs (most recent): Blood pressure (!) 163/93, pulse 87, temperature 97.8 °F (36.6 °C), temperature source Oral, resp. rate 18, height 5' 11" (1.803 m), weight 108 kg (238 lb), SpO2 96 %.     Assessment & Plan     Patient presents status post nail avulsion and arthroplasty 1st MPJ right for hallux rigidus.   Patient is doing well he is not having any significant pain or discomfort he states it is a little bit tight from time to time certainly depends on what he is doing.  Patient advised he can now increase his activity as tolerated it appears that the patient has not really been getting his foot wet there is a lot of dry dead skin on his foot I have advised him he can shower as normal he can apply cream and lotion to the dry skin in gradually increase activity as tolerated he understands he is going to have some inflammation as he breaks down the bad scar tissue and increase his range of motion.  Patient is being released following surgical intervention right foot he has been advised to contact us with any problems questions or concerns patient does have an appointment with Dr. Veloz following a CT of his head which she indicates showed fluid.  Patient relates for the past 6 months he has been experiencing migraines.  Surgical site right is completely " healed with no signs of infection minimal inflammation.     Nilay Cox DPM  12/27/2018

## 2019-01-04 ENCOUNTER — TELEPHONE (OUTPATIENT)
Dept: PODIATRY | Facility: CLINIC | Age: 64
End: 2019-01-04

## 2019-01-04 NOTE — TELEPHONE ENCOUNTER
----- Message from Sherley Grande sent at 1/4/2019  9:08 AM CST -----  Contact: Patient  Type:  Sooner Apoointment Request    Caller is requesting a sooner appointment.  Caller declined first available appointment listed below.  Caller will not accept being placed on the waitlist and is requesting a message be sent to doctor.    Name of Caller:  Patient  When is the first available appointment?  1/15  Symptoms:  Toenail trimming  Best Call Back Number:    Additional Information:  Patient is seeing a specialist the following week

## 2019-01-15 ENCOUNTER — OFFICE VISIT (OUTPATIENT)
Dept: PODIATRY | Facility: CLINIC | Age: 64
End: 2019-01-15
Payer: MEDICARE

## 2019-01-15 VITALS
SYSTOLIC BLOOD PRESSURE: 151 MMHG | HEIGHT: 71 IN | BODY MASS INDEX: 33.32 KG/M2 | WEIGHT: 238 LBS | HEART RATE: 76 BPM | TEMPERATURE: 98 F | RESPIRATION RATE: 18 BRPM | DIASTOLIC BLOOD PRESSURE: 84 MMHG

## 2019-01-15 DIAGNOSIS — M19.071 OSTEOARTHRITIS OF RIGHT ANKLE AND FOOT: ICD-10-CM

## 2019-01-15 DIAGNOSIS — L60.0 INGROWN NAIL: Primary | ICD-10-CM

## 2019-01-15 PROCEDURE — 99213 PR OFFICE/OUTPT VISIT, EST, LEVL III, 20-29 MIN: ICD-10-PCS | Mod: 24,S$PBB,, | Performed by: PODIATRIST

## 2019-01-15 PROCEDURE — 99213 OFFICE O/P EST LOW 20 MIN: CPT | Mod: PBBFAC,PN | Performed by: PODIATRIST

## 2019-01-15 PROCEDURE — 99213 OFFICE O/P EST LOW 20 MIN: CPT | Mod: 24,S$PBB,, | Performed by: PODIATRIST

## 2019-01-15 PROCEDURE — 99999 PR PBB SHADOW E&M-EST. PATIENT-LVL III: CPT | Mod: PBBFAC,,, | Performed by: PODIATRIST

## 2019-01-15 PROCEDURE — 99999 PR PBB SHADOW E&M-EST. PATIENT-LVL III: ICD-10-PCS | Mod: PBBFAC,,, | Performed by: PODIATRIST

## 2019-01-20 NOTE — PROGRESS NOTES
Subjective:       Patient ID: Elmer Cunha is a 63 y.o. male.    Chief Complaint:       Patient is also complaining about a painful ingrown toenail on his left great toe.  Foot Pain   Associated symptoms include arthralgias and joint swelling.   Nail Problem   Associated symptoms include arthralgias and joint swelling.     Review of Systems   Musculoskeletal: Positive for arthralgias, gait problem and joint swelling.   All other systems reviewed and are negative.      Objective:      Physical Exam   Constitutional: He appears well-developed and well-nourished.   Cardiovascular: Normal rate, regular rhythm and normal heart sounds.   Pulses:       Dorsalis pedis pulses are 1+ on the right side, and 1+ on the left side.        Posterior tibial pulses are 1+ on the right side, and 1+ on the left side.   Pulmonary/Chest: Effort normal and breath sounds normal.   Musculoskeletal: He exhibits edema and tenderness.        Right foot: There is decreased range of motion.   Feet:   Right Foot:   Protective Sensation: 4 sites tested. 4 sites sensed.   Skin Integrity: Positive for erythema and warmth.   Left Foot:   Protective Sensation: 4 sites tested. 4 sites sensed.   Neurological: He is alert.   Skin: Skin is warm. Capillary refill takes 2 to 3 seconds.   Psychiatric: He has a normal mood and affect. His behavior is normal. Judgment and thought content normal.   Nursing note and vitals reviewed.        Patient is noted to have an ingrowing toenail secondary to fungal involvement with early signs of infection on the patient's left hallux tenderness and discomfort is noted  Assessment:       1. Ingrown nail    2. Osteoarthritis of right ankle and foot        Plan:                 On evaluation patient has findings consistent with infected ingrown toenail of the left hallux there is some minor nail that is growing into the medial border with early signs of infection noted this required debridement however nail avulsion is  not necessary at this time patient needs to monitor this area closely.  Patient had diffuse callus formation which puts him at risk for ulceration no current signs of infection noted at this time in these areas.  Patient indicated that he is following up with the neurologist he has fluid on his brain he states he is not sure what this means but he is having migraine headaches on a regular basis he states he is very concerned about potential treatments for this.  Total face-to-face time including discussion evaluation removal ingrown toenail from the left great toe and non excisional debridement of the hyperkeratotic lesions the put the patient at risk for ulceration equaled 15 min.  Patient's evaluation is not related to previous surgery on the right foot but the result of an ingrowing toenail on the left hallux unrelated evaluation management performed.    This note was created using [employer] voice recognition software that occasionally misinterpreted phrases or words.

## 2019-02-07 ENCOUNTER — HOSPITAL ENCOUNTER (EMERGENCY)
Facility: HOSPITAL | Age: 64
Discharge: HOME | End: 2019-02-07
Attending: EMERGENCY MEDICINE
Payer: MEDICARE

## 2019-02-07 VITALS
HEIGHT: 71 IN | WEIGHT: 239 LBS | HEART RATE: 75 BPM | SYSTOLIC BLOOD PRESSURE: 127 MMHG | DIASTOLIC BLOOD PRESSURE: 88 MMHG | BODY MASS INDEX: 33.46 KG/M2 | TEMPERATURE: 98 F | OXYGEN SATURATION: 98 % | RESPIRATION RATE: 14 BRPM

## 2019-02-07 DIAGNOSIS — R06.2 WHEEZING: ICD-10-CM

## 2019-02-07 DIAGNOSIS — J18.9 PNEUMONIA OF LEFT LOWER LOBE DUE TO INFECTIOUS ORGANISM: Primary | ICD-10-CM

## 2019-02-07 LAB
INFLUENZA A, MOLECULAR: NEGATIVE
INFLUENZA B, MOLECULAR: NEGATIVE
SPECIMEN SOURCE: NORMAL

## 2019-02-07 PROCEDURE — 63600175 PHARM REV CODE 636 W HCPCS: Performed by: NURSE PRACTITIONER

## 2019-02-07 PROCEDURE — 71046 X-RAY EXAM CHEST 2 VIEWS: CPT | Mod: TC,FY

## 2019-02-07 PROCEDURE — 94640 AIRWAY INHALATION TREATMENT: CPT

## 2019-02-07 PROCEDURE — 99284 EMERGENCY DEPT VISIT MOD MDM: CPT | Mod: 25

## 2019-02-07 PROCEDURE — 27000646 HC AEROBIKA DEVICE

## 2019-02-07 PROCEDURE — 25000242 PHARM REV CODE 250 ALT 637 W/ HCPCS: Performed by: EMERGENCY MEDICINE

## 2019-02-07 PROCEDURE — 25000003 PHARM REV CODE 250: Performed by: NURSE PRACTITIONER

## 2019-02-07 PROCEDURE — 25000242 PHARM REV CODE 250 ALT 637 W/ HCPCS: Performed by: NURSE PRACTITIONER

## 2019-02-07 PROCEDURE — 99900035 HC TECH TIME PER 15 MIN (STAT)

## 2019-02-07 PROCEDURE — 27100098 HC SPACER

## 2019-02-07 PROCEDURE — 94664 DEMO&/EVAL PT USE INHALER: CPT | Mod: 59

## 2019-02-07 PROCEDURE — 71046 X-RAY EXAM CHEST 2 VIEWS: CPT | Mod: 26,,, | Performed by: RADIOLOGY

## 2019-02-07 PROCEDURE — 71046 XR CHEST PA AND LATERAL: ICD-10-PCS | Mod: 26,,, | Performed by: RADIOLOGY

## 2019-02-07 PROCEDURE — 94761 N-INVAS EAR/PLS OXIMETRY MLT: CPT

## 2019-02-07 PROCEDURE — 87502 INFLUENZA DNA AMP PROBE: CPT

## 2019-02-07 RX ORDER — PREDNISONE 10 MG/1
40 TABLET ORAL
Status: COMPLETED | OUTPATIENT
Start: 2019-02-07 | End: 2019-02-07

## 2019-02-07 RX ORDER — LEVOFLOXACIN 500 MG/1
500 TABLET, FILM COATED ORAL
Status: COMPLETED | OUTPATIENT
Start: 2019-02-07 | End: 2019-02-07

## 2019-02-07 RX ORDER — LEVOFLOXACIN 500 MG/1
500 TABLET, FILM COATED ORAL DAILY
Qty: 5 TABLET | Refills: 0 | Status: ON HOLD | OUTPATIENT
Start: 2019-02-07 | End: 2019-02-13 | Stop reason: HOSPADM

## 2019-02-07 RX ORDER — ALBUTEROL SULFATE 0.83 MG/ML
2.5 SOLUTION RESPIRATORY (INHALATION) EVERY 6 HOURS PRN
Qty: 1 BOX | Refills: 0 | Status: SHIPPED | OUTPATIENT
Start: 2019-02-07 | End: 2019-04-23

## 2019-02-07 RX ORDER — IPRATROPIUM BROMIDE AND ALBUTEROL SULFATE 2.5; .5 MG/3ML; MG/3ML
3 SOLUTION RESPIRATORY (INHALATION)
Status: COMPLETED | OUTPATIENT
Start: 2019-02-07 | End: 2019-02-07

## 2019-02-07 RX ORDER — ALBUTEROL SULFATE 90 UG/1
2 AEROSOL, METERED RESPIRATORY (INHALATION)
Status: COMPLETED | OUTPATIENT
Start: 2019-02-07 | End: 2019-02-07

## 2019-02-07 RX ORDER — PREDNISONE 20 MG/1
40 TABLET ORAL DAILY
Qty: 10 TABLET | Refills: 0 | Status: ON HOLD | OUTPATIENT
Start: 2019-02-07 | End: 2019-02-13

## 2019-02-07 RX ADMIN — PREDNISONE 40 MG: 10 TABLET ORAL at 06:02

## 2019-02-07 RX ADMIN — IPRATROPIUM BROMIDE AND ALBUTEROL SULFATE 3 ML: .5; 3 SOLUTION RESPIRATORY (INHALATION) at 05:02

## 2019-02-07 RX ADMIN — LEVOFLOXACIN 500 MG: 500 TABLET, FILM COATED ORAL at 06:02

## 2019-02-07 RX ADMIN — ALBUTEROL SULFATE 2 PUFF: 90 AEROSOL, METERED RESPIRATORY (INHALATION) at 06:02

## 2019-02-07 NOTE — ED PROVIDER NOTES
"Encounter Date: 2/7/2019       History     Chief Complaint   Patient presents with    Shortness of Breath     seen at his MD 2 days ago and started on antibiotics    Cough    Nasal Congestion     Pt c/o shortness of breath, wheezing, body aches.  Seen by Dr. Cerna 2 days ago and put on Keflex.          Review of patient's allergies indicates:   Allergen Reactions    Pcn [penicillins] Hives     Past Medical History:   Diagnosis Date    Depression     Glaucoma     Hypercholesteremia     Hypertension     Stroke      Past Surgical History:   Procedure Laterality Date    ARTHROPLASTY, FOOT Right 11/16/2018    Performed by Nilay Cox DPM at [facility] OR    ATRIAL CARDIAC PACEMAKER INSERTION      CARDIAC CATHETERIZATION  10/25/2017    all arteries "patent"    CARDIAC PACEMAKER PLACEMENT  03/2017    CARDIAC PACEMAKER PLACEMENT      COLONOSCOPY  2013    Dr. Goldstein    EXCISION, NAIL MATRIX Right 11/16/2018    Performed by Nilay Cox DPM at [facility] OR     Family History   Problem Relation Age of Onset    Stroke Brother     Alzheimer's disease Maternal Grandfather      Social History     Tobacco Use    Smoking status: Never Smoker    Smokeless tobacco: Never Used   Substance Use Topics    Alcohol use: Yes     Comment: Rarely    Drug use: No     Review of Systems   Constitutional: Positive for fatigue and fever.   Respiratory: Positive for cough, shortness of breath and wheezing.    Musculoskeletal: Positive for myalgias.   All other systems reviewed and are negative.      Physical Exam     Initial Vitals [02/07/19 1616]   BP Pulse Resp Temp SpO2   127/88 (!) 20 (!) 22 -- 95 %      MAP       --         Physical Exam    Nursing note and vitals reviewed.  Constitutional: He appears well-developed and well-nourished. He appears ill.   HENT:   Head: Normocephalic.   Mouth/Throat: Oropharynx is clear and moist.   Eyes: EOM are normal. Pupils are equal, round, and reactive to light.   Neck: Normal range " of motion. Neck supple.   Cardiovascular: Normal rate.   Pulmonary/Chest: He has decreased breath sounds. He has wheezes.   Abdominal: Soft.   Musculoskeletal: Normal range of motion.   Neurological: He is alert and oriented to person, place, and time.   Skin: Capillary refill takes 2 to 3 seconds.   Psychiatric: He has a normal mood and affect. His behavior is normal.         ED Course   Procedures  Labs Reviewed - No data to display        Results for orders placed or performed during the hospital encounter of 02/07/19   Influenza A & B by Molecular   Result Value Ref Range    Influenza A, Molecular Negative Negative    Influenza B, Molecular Negative Negative    Flu A & B Source Nasal Swab        Imaging Results          X-Ray Chest PA And Lateral (Final result)  Result time 02/07/19 17:37:46    Final result by Apolinar Vidales MD (02/07/19 17:37:46)                 Impression:      Cardiomegaly and bibasal atelectasis      Electronically signed by: Apolinar Vidales MD  Date:    02/07/2019  Time:    17:37             Narrative:    EXAMINATION:  XR CHEST PA AND LATERAL    CLINICAL HISTORY:  Wheezing    TECHNIQUE:  PA and lateral views of the chest were performed.    COMPARISON:  January 14, 2013    FINDINGS:  The heart is enlarged.  A dual lead left subclavian cardiac pacer is in place.  Degenerative changes are seen in the spine.  There is mild bibasal atelectasis.                                                      Clinical Impression:   The primary encounter diagnosis was Pneumonia of left lower lobe due to infectious organism. A diagnosis of Wheezing was also pertinent to this visit.                             Ally Thomas, VEROP  02/07/19 [id number]

## 2019-02-08 NOTE — DISCHARGE INSTRUCTIONS
Take medications as written  Call Dr. Cerna's office in the morning  Use inhaler as instructed  Return to Emergency Department for any worsening symptoms

## 2019-02-08 NOTE — ED NOTES
Patient verbalized understanding of discharge and medication instructions.  To discharge with steady gait  Breathing even and unlabored NAD  Escorted to discharge window.

## 2019-02-11 ENCOUNTER — HOSPITAL ENCOUNTER (INPATIENT)
Facility: HOSPITAL | Age: 64
LOS: 2 days | Discharge: HOME | DRG: 195 | End: 2019-02-13
Attending: INTERNAL MEDICINE | Admitting: INTERNAL MEDICINE
Payer: MEDICARE

## 2019-02-11 DIAGNOSIS — J22 LOWER RESPIRATORY TRACT INFECTION: ICD-10-CM

## 2019-02-11 DIAGNOSIS — E78.00 HYPERCHOLESTEREMIA: Primary | ICD-10-CM

## 2019-02-11 DIAGNOSIS — R17 ELEVATED BILIRUBIN: ICD-10-CM

## 2019-02-11 DIAGNOSIS — M20.21 HALLUX RIGIDUS OF RIGHT FOOT: ICD-10-CM

## 2019-02-11 DIAGNOSIS — G91.9 HYDROCEPHALUS IN ADULT: ICD-10-CM

## 2019-02-11 DIAGNOSIS — I10 ESSENTIAL HYPERTENSION: ICD-10-CM

## 2019-02-11 DIAGNOSIS — G47.61 PLMD (PERIODIC LIMB MOVEMENT DISORDER): ICD-10-CM

## 2019-02-11 DIAGNOSIS — Z95.0 PACEMAKER: ICD-10-CM

## 2019-02-11 DIAGNOSIS — I48.20 CHRONIC ATRIAL FIBRILLATION: ICD-10-CM

## 2019-02-11 DIAGNOSIS — R45.89 DYSPHORIC MOOD: ICD-10-CM

## 2019-02-11 DIAGNOSIS — I67.9 SMALL VESSEL DISEASE, CEREBROVASCULAR: ICD-10-CM

## 2019-02-11 DIAGNOSIS — Z86.73 HISTORY OF TIA (TRANSIENT ISCHEMIC ATTACK): ICD-10-CM

## 2019-02-11 DIAGNOSIS — R05.9 COUGH: ICD-10-CM

## 2019-02-11 DIAGNOSIS — M15.9 PRIMARY OSTEOARTHRITIS INVOLVING MULTIPLE JOINTS: ICD-10-CM

## 2019-02-11 DIAGNOSIS — R13.19 ESOPHAGEAL DYSPHAGIA: ICD-10-CM

## 2019-02-11 DIAGNOSIS — R06.02 SHORTNESS OF BREATH: ICD-10-CM

## 2019-02-11 DIAGNOSIS — L60.0 INGROWN NAIL: ICD-10-CM

## 2019-02-11 DIAGNOSIS — K21.9 GASTROESOPHAGEAL REFLUX DISEASE, ESOPHAGITIS PRESENCE NOT SPECIFIED: ICD-10-CM

## 2019-02-11 DIAGNOSIS — J18.9 PNEUMONIA: ICD-10-CM

## 2019-02-11 DIAGNOSIS — K59.04 CHRONIC IDIOPATHIC CONSTIPATION: ICD-10-CM

## 2019-02-11 LAB
ALBUMIN SERPL BCP-MCNC: 3.7 G/DL
ALP SERPL-CCNC: 124 U/L
ALT SERPL W/O P-5'-P-CCNC: 20 U/L
ANION GAP SERPL CALC-SCNC: 10 MMOL/L
AST SERPL-CCNC: 17 U/L
BASOPHILS # BLD AUTO: 0.01 K/UL
BASOPHILS NFR BLD: 0.1 %
BILIRUB SERPL-MCNC: 1.1 MG/DL
BNP SERPL-MCNC: 30 PG/ML
BUN SERPL-MCNC: 19 MG/DL
CALCIUM SERPL-MCNC: 9.1 MG/DL
CHLORIDE SERPL-SCNC: 104 MMOL/L
CO2 SERPL-SCNC: 23 MMOL/L
CREAT SERPL-MCNC: 0.9 MG/DL
DIFFERENTIAL METHOD: ABNORMAL
EOSINOPHIL # BLD AUTO: 0 K/UL
EOSINOPHIL NFR BLD: 0 %
ERYTHROCYTE [DISTWIDTH] IN BLOOD BY AUTOMATED COUNT: 12.5 %
EST. GFR  (AFRICAN AMERICAN): >60 ML/MIN/1.73 M^2
EST. GFR  (NON AFRICAN AMERICAN): >60 ML/MIN/1.73 M^2
GLUCOSE SERPL-MCNC: 141 MG/DL
HCT VFR BLD AUTO: 43.9 %
HGB BLD-MCNC: 15.1 G/DL
IMM GRANULOCYTES # BLD AUTO: 0.04 K/UL
IMM GRANULOCYTES NFR BLD AUTO: 0.4 %
LYMPHOCYTES # BLD AUTO: 0.7 K/UL
LYMPHOCYTES NFR BLD: 7 %
MCH RBC QN AUTO: 29.4 PG
MCHC RBC AUTO-ENTMCNC: 34.4 G/DL
MCV RBC AUTO: 85 FL
MONOCYTES # BLD AUTO: 0.2 K/UL
MONOCYTES NFR BLD: 1.8 %
NEUTROPHILS # BLD AUTO: 9 K/UL
NEUTROPHILS NFR BLD: 90.7 %
NRBC BLD-RTO: 0 /100 WBC
PLATELET # BLD AUTO: 203 K/UL
PMV BLD AUTO: 10 FL
POTASSIUM SERPL-SCNC: 4.6 MMOL/L
PROT SERPL-MCNC: 6 G/DL
RBC # BLD AUTO: 5.14 M/UL
SODIUM SERPL-SCNC: 137 MMOL/L
WBC # BLD AUTO: 9.9 K/UL

## 2019-02-11 PROCEDURE — 11000001 HC ACUTE MED/SURG PRIVATE ROOM

## 2019-02-11 PROCEDURE — 83880 ASSAY OF NATRIURETIC PEPTIDE: CPT

## 2019-02-11 PROCEDURE — 63600175 PHARM REV CODE 636 W HCPCS: Performed by: INTERNAL MEDICINE

## 2019-02-11 PROCEDURE — 94761 N-INVAS EAR/PLS OXIMETRY MLT: CPT

## 2019-02-11 PROCEDURE — 36415 COLL VENOUS BLD VENIPUNCTURE: CPT

## 2019-02-11 PROCEDURE — 71045 X-RAY EXAM CHEST 1 VIEW: CPT | Mod: 26,,, | Performed by: RADIOLOGY

## 2019-02-11 PROCEDURE — 71045 X-RAY EXAM CHEST 1 VIEW: CPT | Mod: TC,FY

## 2019-02-11 PROCEDURE — 94640 AIRWAY INHALATION TREATMENT: CPT

## 2019-02-11 PROCEDURE — 85025 COMPLETE CBC W/AUTO DIFF WBC: CPT

## 2019-02-11 PROCEDURE — 93005 ELECTROCARDIOGRAM TRACING: CPT

## 2019-02-11 PROCEDURE — 27000221 HC OXYGEN, UP TO 24 HOURS

## 2019-02-11 PROCEDURE — 71045 XR CHEST AP PORTABLE: ICD-10-PCS | Mod: 26,,, | Performed by: RADIOLOGY

## 2019-02-11 PROCEDURE — 80053 COMPREHEN METABOLIC PANEL: CPT

## 2019-02-11 PROCEDURE — 25000242 PHARM REV CODE 250 ALT 637 W/ HCPCS

## 2019-02-11 PROCEDURE — 25000003 PHARM REV CODE 250: Performed by: INTERNAL MEDICINE

## 2019-02-11 RX ORDER — IPRATROPIUM BROMIDE AND ALBUTEROL SULFATE 2.5; .5 MG/3ML; MG/3ML
SOLUTION RESPIRATORY (INHALATION)
Status: COMPLETED
Start: 2019-02-11 | End: 2019-02-11

## 2019-02-11 RX ORDER — ACETAMINOPHEN 325 MG/1
650 TABLET ORAL EVERY 4 HOURS PRN
Status: DISCONTINUED | OUTPATIENT
Start: 2019-02-11 | End: 2019-02-13 | Stop reason: HOSPADM

## 2019-02-11 RX ORDER — ATORVASTATIN CALCIUM 40 MG/1
40 TABLET, FILM COATED ORAL DAILY
Status: DISCONTINUED | OUTPATIENT
Start: 2019-02-12 | End: 2019-02-13 | Stop reason: HOSPADM

## 2019-02-11 RX ORDER — SODIUM CHLORIDE, SODIUM LACTATE, POTASSIUM CHLORIDE, CALCIUM CHLORIDE 600; 310; 30; 20 MG/100ML; MG/100ML; MG/100ML; MG/100ML
INJECTION, SOLUTION INTRAVENOUS CONTINUOUS
Status: DISCONTINUED | OUTPATIENT
Start: 2019-02-11 | End: 2019-02-13

## 2019-02-11 RX ORDER — METOPROLOL SUCCINATE 25 MG/1
25 TABLET, EXTENDED RELEASE ORAL DAILY
Status: DISCONTINUED | OUTPATIENT
Start: 2019-02-12 | End: 2019-02-13 | Stop reason: HOSPADM

## 2019-02-11 RX ORDER — ESCITALOPRAM OXALATE 10 MG/1
20 TABLET ORAL DAILY
Status: DISCONTINUED | OUTPATIENT
Start: 2019-02-12 | End: 2019-02-13 | Stop reason: HOSPADM

## 2019-02-11 RX ORDER — AMLODIPINE BESYLATE 2.5 MG/1
5 TABLET ORAL DAILY
Status: DISCONTINUED | OUTPATIENT
Start: 2019-02-12 | End: 2019-02-13 | Stop reason: HOSPADM

## 2019-02-11 RX ORDER — PANTOPRAZOLE SODIUM 40 MG/1
40 TABLET, DELAYED RELEASE ORAL DAILY
Status: DISCONTINUED | OUTPATIENT
Start: 2019-02-12 | End: 2019-02-13 | Stop reason: HOSPADM

## 2019-02-11 RX ORDER — TAMSULOSIN HYDROCHLORIDE 0.4 MG/1
0.4 CAPSULE ORAL DAILY
Status: DISCONTINUED | OUTPATIENT
Start: 2019-02-12 | End: 2019-02-13 | Stop reason: HOSPADM

## 2019-02-11 RX ORDER — IPRATROPIUM BROMIDE AND ALBUTEROL SULFATE 2.5; .5 MG/3ML; MG/3ML
3 SOLUTION RESPIRATORY (INHALATION)
Status: DISCONTINUED | OUTPATIENT
Start: 2019-02-11 | End: 2019-02-13 | Stop reason: HOSPADM

## 2019-02-11 RX ADMIN — IPRATROPIUM BROMIDE AND ALBUTEROL SULFATE 3 ML: .5; 3 SOLUTION RESPIRATORY (INHALATION) at 08:02

## 2019-02-11 RX ADMIN — CEFTRIAXONE 1 G: 1 INJECTION, SOLUTION INTRAVENOUS at 04:02

## 2019-02-11 RX ADMIN — APIXABAN 5 MG: 2.5 TABLET, FILM COATED ORAL at 08:02

## 2019-02-11 RX ADMIN — DOXYCYCLINE 100 MG: 100 INJECTION, POWDER, LYOPHILIZED, FOR SOLUTION INTRAVENOUS at 04:02

## 2019-02-11 RX ADMIN — SODIUM CHLORIDE, POTASSIUM CHLORIDE, SODIUM LACTATE AND CALCIUM CHLORIDE: 600; 310; 30; 20 INJECTION, SOLUTION INTRAVENOUS at 04:02

## 2019-02-11 RX ADMIN — METHYLPREDNISOLONE SODIUM SUCCINATE 40 MG: 40 INJECTION, POWDER, FOR SOLUTION INTRAMUSCULAR; INTRAVENOUS at 06:02

## 2019-02-11 RX ADMIN — ACETAMINOPHEN 650 MG: 325 TABLET ORAL at 06:02

## 2019-02-11 NOTE — PLAN OF CARE
02/11/19 1723   Medicare Message   Important Message from Medicare regarding Discharge Appeal Rights Given to patient/caregiver;Explained to patient/caregiver;Signed/date by patient/caregiver   Date IMM was signed 02/11/19   Time IMM was signed 1548   VIRY EXPLAINED IMM TO PT AND HE SIGNED THE FORM.

## 2019-02-11 NOTE — PLAN OF CARE
Problem: Fluid Imbalance (Pneumonia)  Goal: Fluid Balance  Outcome: Ongoing (interventions implemented as appropriate)  Intervention: Monitor and Manage Fluid Balance   02/11/19 1733   Monitor and Manage Cardiac Rhythm Effects   Fluid/Electrolyte Management intravenous fluid replacement initiated         Problem: Infection (Pneumonia)  Goal: Resolution of Infection Signs/Symptoms  Outcome: Ongoing (interventions implemented as appropriate)  Intervention: Prevent Infection Progression   02/11/19 1733   Prevent or Manage Infection   Fever Reduction/Comfort Measures fluid intake increased   Infection Management aseptic technique maintained         Problem: Respiratory Compromise (Pneumonia)  Goal: Effective Oxygenation and Ventilation  Outcome: Ongoing (interventions implemented as appropriate)  Intervention: Promote Airway Secretion Clearance   02/11/19 1733   Promote Airway Secretion Clearance   Breathing Techniques/Airway Clearance deep/controlled cough encouraged   Promote Airway Secretion Clearance   Cough And Deep Breathing done independently per patient     Intervention: Optimize Oxygenation and Ventilation   02/11/19 1733   Support Asthma Symptom Control   Airway/Ventilation Management calming measures promoted   Prevent Additional Skin Injury   Head of Bed (HOB) HOB at 30-45 degrees

## 2019-02-11 NOTE — PLAN OF CARE
02/11/19 1725   Discharge Assessment   Assessment Type Discharge Planning Assessment   Confirmed/corrected address and phone number on facesheet? Yes   Assessment information obtained from? Patient   Expected Length of Stay (days) 3   Communicated expected length of stay with patient/caregiver yes   Prior to hospitilization cognitive status: Alert/Oriented   Prior to hospitalization functional status: Independent   Current cognitive status: Alert/Oriented   Current Functional Status: Needs Assistance   Facility Arrived From: MD'S OFFICE   Lives With alone   Able to Return to Prior Arrangements yes   Is patient able to care for self after discharge? Yes   Who are your caregiver(s) and their phone number(s)? JAKE MOORE [phone]   Patient's perception of discharge disposition home or selfcare   Readmission Within the Last 30 Days no previous admission in last 30 days   Patient currently being followed by outpatient case management? No   Patient currently receives any other outside agency services? No   Equipment Currently Used at Home cane, straight;bedside commode;walker, standard   Do you have any problems affording any of your prescribed medications? No   Is the patient taking medications as prescribed? yes   Does the patient have transportation home? Yes   Transportation Anticipated family or friend will provide   Dialysis Name and Scheduled days N/A   Does the patient receive services at the [facility]madin Clinic? No   Discharge Plan A Home   DME Needed Upon Discharge  none   Patient/Family in Agreement with Plan yes   PT LIVES ALONE [place]. HE HAS A DAUGHTER AND 5 SONS. DAUGHTER ASSISTS AS NEEDED. PT ASKED SW TO ASSIST WITH AN ADVANCE DIRECTIVE. SW COMPLETED THE FORM WITH PT BUT IT HAS NOT BEEN NOTARIZED BECAUSE HE WANTS TO DISCUSS IT WITH HIS DAUGHTER. VIRY WILL F/U WITH PT TOMORROW.

## 2019-02-12 PROCEDURE — 93005 ELECTROCARDIOGRAM TRACING: CPT

## 2019-02-12 PROCEDURE — 94640 AIRWAY INHALATION TREATMENT: CPT

## 2019-02-12 PROCEDURE — 11000001 HC ACUTE MED/SURG PRIVATE ROOM

## 2019-02-12 PROCEDURE — 63600175 PHARM REV CODE 636 W HCPCS: Performed by: INTERNAL MEDICINE

## 2019-02-12 PROCEDURE — 27000221 HC OXYGEN, UP TO 24 HOURS

## 2019-02-12 PROCEDURE — 25000242 PHARM REV CODE 250 ALT 637 W/ HCPCS: Performed by: INTERNAL MEDICINE

## 2019-02-12 PROCEDURE — 25000003 PHARM REV CODE 250: Performed by: INTERNAL MEDICINE

## 2019-02-12 PROCEDURE — 94761 N-INVAS EAR/PLS OXIMETRY MLT: CPT

## 2019-02-12 RX ORDER — METOPROLOL SUCCINATE 25 MG/1
TABLET, EXTENDED RELEASE ORAL
Status: DISPENSED
Start: 2019-02-12 | End: 2019-02-12

## 2019-02-12 RX ADMIN — DOXYCYCLINE 100 MG: 100 INJECTION, POWDER, LYOPHILIZED, FOR SOLUTION INTRAVENOUS at 03:02

## 2019-02-12 RX ADMIN — CEFTRIAXONE 1 G: 1 INJECTION, SOLUTION INTRAVENOUS at 02:02

## 2019-02-12 RX ADMIN — IPRATROPIUM BROMIDE AND ALBUTEROL SULFATE 3 ML: .5; 3 SOLUTION RESPIRATORY (INHALATION) at 10:02

## 2019-02-12 RX ADMIN — ESCITALOPRAM OXALATE 20 MG: 10 TABLET ORAL at 08:02

## 2019-02-12 RX ADMIN — APIXABAN 5 MG: 2.5 TABLET, FILM COATED ORAL at 09:02

## 2019-02-12 RX ADMIN — TAMSULOSIN HYDROCHLORIDE 0.4 MG: 0.4 CAPSULE ORAL at 08:02

## 2019-02-12 RX ADMIN — IPRATROPIUM BROMIDE AND ALBUTEROL SULFATE 3 ML: .5; 3 SOLUTION RESPIRATORY (INHALATION) at 04:02

## 2019-02-12 RX ADMIN — METOPROLOL SUCCINATE 25 MG: 25 TABLET, EXTENDED RELEASE ORAL at 03:02

## 2019-02-12 RX ADMIN — METHYLPREDNISOLONE SODIUM SUCCINATE 40 MG: 40 INJECTION, POWDER, FOR SOLUTION INTRAMUSCULAR; INTRAVENOUS at 05:02

## 2019-02-12 RX ADMIN — IPRATROPIUM BROMIDE AND ALBUTEROL SULFATE 3 ML: .5; 3 SOLUTION RESPIRATORY (INHALATION) at 07:02

## 2019-02-12 RX ADMIN — METOPROLOL SUCCINATE 25 MG: 25 TABLET, EXTENDED RELEASE ORAL at 08:02

## 2019-02-12 RX ADMIN — ACETAMINOPHEN 650 MG: 325 TABLET ORAL at 11:02

## 2019-02-12 RX ADMIN — APIXABAN 5 MG: 2.5 TABLET, FILM COATED ORAL at 08:02

## 2019-02-12 RX ADMIN — AMLODIPINE BESYLATE 5 MG: 2.5 TABLET ORAL at 08:02

## 2019-02-12 RX ADMIN — METHYLPREDNISOLONE SODIUM SUCCINATE 40 MG: 40 INJECTION, POWDER, FOR SOLUTION INTRAMUSCULAR; INTRAVENOUS at 11:02

## 2019-02-12 RX ADMIN — SODIUM CHLORIDE, POTASSIUM CHLORIDE, SODIUM LACTATE AND CALCIUM CHLORIDE: 600; 310; 30; 20 INJECTION, SOLUTION INTRAVENOUS at 03:02

## 2019-02-12 RX ADMIN — METHYLPREDNISOLONE SODIUM SUCCINATE 40 MG: 40 INJECTION, POWDER, FOR SOLUTION INTRAMUSCULAR; INTRAVENOUS at 12:02

## 2019-02-12 RX ADMIN — CEFTRIAXONE 1 G: 1 INJECTION, SOLUTION INTRAVENOUS at 03:02

## 2019-02-12 RX ADMIN — ATORVASTATIN CALCIUM 40 MG: 40 TABLET, FILM COATED ORAL at 08:02

## 2019-02-12 RX ADMIN — PANTOPRAZOLE SODIUM 40 MG: 40 TABLET, DELAYED RELEASE ORAL at 08:02

## 2019-02-12 RX ADMIN — METHYLPREDNISOLONE SODIUM SUCCINATE 40 MG: 40 INJECTION, POWDER, FOR SOLUTION INTRAMUSCULAR; INTRAVENOUS at 06:02

## 2019-02-12 NOTE — SUBJECTIVE & OBJECTIVE
Interval History: feeling better  Still coughing  Less sob    Review of Systems   Respiratory: Positive for cough and shortness of breath.    All other systems reviewed and are negative.    Objective:     Vital Signs (Most Recent):  Temp: 96 °F (35.6 °C) (02/12/19 0724)  Pulse: 73 (02/12/19 0801)  Resp: 16 (02/12/19 0745)  BP: (!) 155/71 (02/12/19 0801)  SpO2: 98 % (02/12/19 0745) Vital Signs (24h Range):  Temp:  [96 °F (35.6 °C)-97.9 °F (36.6 °C)] 96 °F (35.6 °C)  Pulse:  [70-84] 73  Resp:  [16-18] 16  SpO2:  [96 %-98 %] 98 %  BP: (125-182)/() 155/71     Weight: 108.6 kg (239 lb 6.4 oz)  Body mass index is 33.39 kg/m².    Intake/Output Summary (Last 24 hours) at 2/12/2019 0856  Last data filed at 2/12/2019 0733  Gross per 24 hour   Intake 1202.5 ml   Output 2575 ml   Net -1372.5 ml      Physical Exam   Constitutional: He is oriented to person, place, and time. Vital signs are normal. He appears well-developed and well-nourished. He is active.   HENT:   Head: Normocephalic and atraumatic.   Eyes: Conjunctivae are normal. Pupils are equal, round, and reactive to light.   Neck: Normal range of motion. Neck supple. No thyromegaly present.   Cardiovascular: Normal rate, regular rhythm and normal heart sounds.   Pulmonary/Chest: Effort normal. He has wheezes.   Abdominal: Soft. Normal appearance and bowel sounds are normal. There is no tenderness.   Musculoskeletal: Normal range of motion.   Neurological: He is alert and oriented to person, place, and time.   Skin: Skin is warm and dry.   Psychiatric: He has a normal mood and affect. His behavior is normal. Judgment and thought content normal.   Nursing note and vitals reviewed.      Significant Labs:   Recent Lab Results       02/11/19  1444        Immature Granulocytes 0.4     Immature Grans (Abs) 0.04  Comment:  Mild elevation in immature granulocytes is non specific and   can be seen in a variety of conditions including stress response,   acute inflammation,  trauma and pregnancy. Correlation with other   laboratory and clinical findings is essential.       Albumin 3.7     Alkaline Phosphatase 124     ALT 20     Anion Gap 10     AST 17     Baso # 0.01     Basophil% 0.1     Total Bilirubin 1.1  Comment:  For infants and newborns, interpretation of results should be based  on gestational age, weight and in agreement with clinical  observations.  Premature Infant recommended reference ranges:  Up to 24 hours.............<8.0 mg/dL  Up to 48 hours............<12.0 mg/dL  3-5 days..................<15.0 mg/dL  6-29 days.................<15.0 mg/dL       BNP 30  Comment:  Values of less than 100 pg/ml are consistent with non-CHF populations.     BUN, Bld 19     Calcium 9.1     Chloride 104     CO2 23     Creatinine 0.9     Differential Method Automated     eGFR if African American >60.0     eGFR if non  >60.0  Comment:  Calculation used to obtain the estimated glomerular filtration  rate (eGFR) is the CKD-EPI equation.        Eos # 0.0     Eosinophil% 0.0     Glucose 141     Gran # (ANC) 9.0     Gran% 90.7     Hematocrit 43.9     Hemoglobin 15.1     Lymph # 0.7     Lymph% 7.0     MCH 29.4     MCHC 34.4     MCV 85     Mono # 0.2     Mono% 1.8     MPV 10.0     nRBC 0     Platelets 203     Potassium 4.6     Total Protein 6.0     RBC 5.14     RDW 12.5     Sodium 137     WBC 9.90           Significant Imaging: CXR: I have reviewed all pertinent results/findings within the past 24 hours and my personal findings are:  bibasilar inf

## 2019-02-12 NOTE — PROGRESS NOTES
[facility] Medicine  Progress Note    Patient Name: Elmer Cunha  MRN: [id number]  Patient Class: IP- Inpatient   Admission Date: 2/11/2019  Length of Stay: 1 days  Attending Physician: Sonny Cerna III, MD  Primary Care Provider: Sonny Cerna III, MD        Subjective:     Principal Problem:Pneumonia    HPI:  No notes on file    Hospital Course:  No notes on file    Interval History: feeling better  Still coughing  Less sob    Review of Systems   Respiratory: Positive for cough and shortness of breath.    All other systems reviewed and are negative.    Objective:     Vital Signs (Most Recent):  Temp: 96 °F (35.6 °C) (02/12/19 0724)  Pulse: 73 (02/12/19 0801)  Resp: 16 (02/12/19 0745)  BP: (!) 155/71 (02/12/19 0801)  SpO2: 98 % (02/12/19 0745) Vital Signs (24h Range):  Temp:  [96 °F (35.6 °C)-97.9 °F (36.6 °C)] 96 °F (35.6 °C)  Pulse:  [70-84] 73  Resp:  [16-18] 16  SpO2:  [96 %-98 %] 98 %  BP: (125-182)/() 155/71     Weight: 108.6 kg (239 lb 6.4 oz)  Body mass index is 33.39 kg/m².    Intake/Output Summary (Last 24 hours) at 2/12/2019 0856  Last data filed at 2/12/2019 0733  Gross per 24 hour   Intake 1202.5 ml   Output 2575 ml   Net -1372.5 ml      Physical Exam   Constitutional: He is oriented to person, place, and time. Vital signs are normal. He appears well-developed and well-nourished. He is active.   HENT:   Head: Normocephalic and atraumatic.   Eyes: Conjunctivae are normal. Pupils are equal, round, and reactive to light.   Neck: Normal range of motion. Neck supple. No thyromegaly present.   Cardiovascular: Normal rate, regular rhythm and normal heart sounds.   Pulmonary/Chest: Effort normal. He has wheezes.   Abdominal: Soft. Normal appearance and bowel sounds are normal. There is no tenderness.   Musculoskeletal: Normal range of motion.   Neurological: He is alert and oriented to person, place, and time.   Skin: Skin is warm and dry.   Psychiatric: He has a  normal mood and affect. His behavior is normal. Judgment and thought content normal.   Nursing note and vitals reviewed.      Significant Labs:   Recent Lab Results       02/11/19  1444        Immature Granulocytes 0.4     Immature Grans (Abs) 0.04  Comment:  Mild elevation in immature granulocytes is non specific and   can be seen in a variety of conditions including stress response,   acute inflammation, trauma and pregnancy. Correlation with other   laboratory and clinical findings is essential.       Albumin 3.7     Alkaline Phosphatase 124     ALT 20     Anion Gap 10     AST 17     Baso # 0.01     Basophil% 0.1     Total Bilirubin 1.1  Comment:  For infants and newborns, interpretation of results should be based  on gestational age, weight and in agreement with clinical  observations.  Premature Infant recommended reference ranges:  Up to 24 hours.............<8.0 mg/dL  Up to 48 hours............<12.0 mg/dL  3-5 days..................<15.0 mg/dL  6-29 days.................<15.0 mg/dL       BNP 30  Comment:  Values of less than 100 pg/ml are consistent with non-CHF populations.     BUN, Bld 19     Calcium 9.1     Chloride 104     CO2 23     Creatinine 0.9     Differential Method Automated     eGFR if African American >60.0     eGFR if non  >60.0  Comment:  Calculation used to obtain the estimated glomerular filtration  rate (eGFR) is the CKD-EPI equation.        Eos # 0.0     Eosinophil% 0.0     Glucose 141     Gran # (ANC) 9.0     Gran% 90.7     Hematocrit 43.9     Hemoglobin 15.1     Lymph # 0.7     Lymph% 7.0     MCH 29.4     MCHC 34.4     MCV 85     Mono # 0.2     Mono% 1.8     MPV 10.0     nRBC 0     Platelets 203     Potassium 4.6     Total Protein 6.0     RBC 5.14     RDW 12.5     Sodium 137     WBC 9.90           Significant Imaging: CXR: I have reviewed all pertinent results/findings within the past 24 hours and my personal findings are:  bibasilar inf    Assessment/Plan:      *  Pneumonia    2.12  Feeling better  Continue abx and steroids  Home when able         VTE Risk Mitigation (From admission, onward)        Ordered     apixaban tablet 5 mg  2 times daily      02/11/19 6969              Sonny Cerna III, MD  Department of Hospital Medicine   [facility] - Med Surg

## 2019-02-12 NOTE — PLAN OF CARE
Problem: Respiratory Compromise (Pneumonia)  Goal: Effective Oxygenation and Ventilation    Intervention: Promote Airway Secretion Clearance   02/12/19 0929   Promote Airway Secretion Clearance   Breathing Techniques/Airway Clearance deep/controlled cough encouraged   Promote Airway Secretion Clearance   Cough And Deep Breathing done independently per patient   CONT TO ENCOURAGE PT

## 2019-02-12 NOTE — PLAN OF CARE
Problem: Adult Inpatient Plan of Care  Goal: Readiness for Transition of Care    Intervention: Mutually Develop Transition Plan   02/11/19 1725 02/11/19 1940   OTHER   Communicated expected length of stay with patient/caregiver yes --    Is patient able to care for self after discharge? Yes --    Who are your caregiver(s) and their phone number(s)? JAKE MOORE [phone] --    Patient currently receives home health services? --  No   (RETIRED) Discharge Needs Assessment   How many people do you have in your home that can help with your care after discharge? --  0   Discharge Needs Assessment   Readmission Within the Last 30 Days no previous admission in last 30 days --    Equipment Currently Used at Home cane, straight;bedside commode;walker, standard --    Transportation Anticipated family or friend will provide --    Social Work Plan   Patient/Family in Agreement with Plan yes --    Living Environment   Able to Return to Prior Arrangements yes --    (RETIRED) Current Health   Expected Length of Stay (days) 3 --    (RETIRED) Social Work Plan   Patient's perception of discharge disposition home or selfcare --

## 2019-02-12 NOTE — PLAN OF CARE
Problem: Adult Inpatient Plan of Care  Goal: Plan of Care Review  Outcome: Ongoing (interventions implemented as appropriate)   02/12/19 5387   Plan of Care Review   Plan of Care Reviewed With patient;daughter

## 2019-02-12 NOTE — PLAN OF CARE
02/12/19 1618   Discharge Reassessment   Assessment Type Discharge Planning Reassessment   Anticipated Discharge Disposition Home   Provided patient/caregiver education on the expected discharge date and the discharge plan Yes   Do you have any problems affording any of your prescribed medications? No   Discharge Plan A Home   DME Needed Upon Discharge  nebulizer   Patient choice form signed by patient/caregiver N/A   VIRY SPOKE WITH PT AGAIN TODAY. HIS DAUGHTER WAS HERE THIS AM BUT LEFT. PT INFORMS SW HE HAS DECIDED HE WANTS TO WAIT ON COMPLETING THE ADVANCE DIRECTIVE UNTIL HE FEELS BETTER AND HE AND HIS DAUGHTER HAVE MORE TIME TO DISCUSS THAT DOCUMENT. PT WANTS A NEBULIZER. VIRY TOLD HIM MD NEEDS ONLY TO WRITE AN ORDER AND SW WILL OBTAIN NEBULIZER FOR PT. VIRY WILL CONTINUE TO FOLLOW.

## 2019-02-12 NOTE — PLAN OF CARE
Problem: Respiratory Compromise (Pneumonia)  Goal: Effective Oxygenation and Ventilation    Intervention: Promote Airway Secretion Clearance   02/12/19 0252   Promote Airway Secretion Clearance   Breathing Techniques/Airway Clearance deep/controlled cough encouraged   Promote Airway Secretion Clearance   Cough And Deep Breathing done independently per patient  (PT ENCOURAGED TO CONTINUE COUGH/DEEP BREATHING)

## 2019-02-13 VITALS
TEMPERATURE: 96 F | SYSTOLIC BLOOD PRESSURE: 162 MMHG | RESPIRATION RATE: 16 BRPM | BODY MASS INDEX: 33.51 KG/M2 | OXYGEN SATURATION: 96 % | DIASTOLIC BLOOD PRESSURE: 90 MMHG | WEIGHT: 239.38 LBS | HEIGHT: 71 IN | HEART RATE: 72 BPM

## 2019-02-13 PROCEDURE — 25000003 PHARM REV CODE 250: Performed by: INTERNAL MEDICINE

## 2019-02-13 PROCEDURE — 25000242 PHARM REV CODE 250 ALT 637 W/ HCPCS: Performed by: INTERNAL MEDICINE

## 2019-02-13 PROCEDURE — 94640 AIRWAY INHALATION TREATMENT: CPT

## 2019-02-13 PROCEDURE — 63600175 PHARM REV CODE 636 W HCPCS: Performed by: INTERNAL MEDICINE

## 2019-02-13 PROCEDURE — 94761 N-INVAS EAR/PLS OXIMETRY MLT: CPT

## 2019-02-13 RX ORDER — PREDNISONE 10 MG/1
20 TABLET ORAL DAILY
Qty: 30 TABLET | Refills: 0 | Status: SHIPPED | OUTPATIENT
Start: 2019-02-13 | End: 2019-02-18

## 2019-02-13 RX ORDER — IPRATROPIUM BROMIDE AND ALBUTEROL SULFATE 2.5; .5 MG/3ML; MG/3ML
3 SOLUTION RESPIRATORY (INHALATION) EVERY 4 HOURS
Qty: 120 VIAL | Refills: 5 | Status: SHIPPED | OUTPATIENT
Start: 2019-02-13 | End: 2019-06-25

## 2019-02-13 RX ORDER — PANTOPRAZOLE SODIUM 40 MG/1
40 TABLET, DELAYED RELEASE ORAL DAILY
Qty: 30 TABLET | Refills: 11 | Status: SHIPPED | OUTPATIENT
Start: 2019-02-14 | End: 2020-02-27

## 2019-02-13 RX ORDER — DOXYCYCLINE 100 MG/1
100 CAPSULE ORAL 2 TIMES DAILY WITH MEALS
Qty: 20 CAPSULE | Refills: 1 | Status: SHIPPED | OUTPATIENT
Start: 2019-02-13 | End: 2019-04-23

## 2019-02-13 RX ADMIN — METHYLPREDNISOLONE SODIUM SUCCINATE 40 MG: 40 INJECTION, POWDER, FOR SOLUTION INTRAMUSCULAR; INTRAVENOUS at 12:02

## 2019-02-13 RX ADMIN — ESCITALOPRAM OXALATE 20 MG: 10 TABLET ORAL at 10:02

## 2019-02-13 RX ADMIN — APIXABAN 5 MG: 2.5 TABLET, FILM COATED ORAL at 10:02

## 2019-02-13 RX ADMIN — ATORVASTATIN CALCIUM 40 MG: 40 TABLET, FILM COATED ORAL at 10:02

## 2019-02-13 RX ADMIN — DOXYCYCLINE 100 MG: 100 INJECTION, POWDER, LYOPHILIZED, FOR SOLUTION INTRAVENOUS at 03:02

## 2019-02-13 RX ADMIN — METOPROLOL SUCCINATE 25 MG: 25 TABLET, EXTENDED RELEASE ORAL at 10:02

## 2019-02-13 RX ADMIN — SODIUM CHLORIDE, POTASSIUM CHLORIDE, SODIUM LACTATE AND CALCIUM CHLORIDE 1000 ML: 600; 310; 30; 20 INJECTION, SOLUTION INTRAVENOUS at 12:02

## 2019-02-13 RX ADMIN — IPRATROPIUM BROMIDE AND ALBUTEROL SULFATE 3 ML: .5; 3 SOLUTION RESPIRATORY (INHALATION) at 07:02

## 2019-02-13 RX ADMIN — METHYLPREDNISOLONE SODIUM SUCCINATE 40 MG: 40 INJECTION, POWDER, FOR SOLUTION INTRAMUSCULAR; INTRAVENOUS at 05:02

## 2019-02-13 RX ADMIN — TAMSULOSIN HYDROCHLORIDE 0.4 MG: 0.4 CAPSULE ORAL at 10:02

## 2019-02-13 RX ADMIN — AMLODIPINE BESYLATE 5 MG: 2.5 TABLET ORAL at 10:02

## 2019-02-13 RX ADMIN — CEFTRIAXONE 1 G: 1 INJECTION, SOLUTION INTRAVENOUS at 03:02

## 2019-02-13 RX ADMIN — IPRATROPIUM BROMIDE AND ALBUTEROL SULFATE 3 ML: .5; 3 SOLUTION RESPIRATORY (INHALATION) at 11:02

## 2019-02-13 RX ADMIN — PANTOPRAZOLE SODIUM 40 MG: 40 TABLET, DELAYED RELEASE ORAL at 10:02

## 2019-02-13 NOTE — ASSESSMENT & PLAN NOTE
2.12  Feeling better  Continue abx and steroids  Home when able  2.13  Admitted with pneumonia and sob after failed op tx in office and ed.  Was unable to complete sentences due to sob  Treated with iv abx and steroids  Much improved  Today vss  Chest cta  A/p: pneumonia, arrhythmia  D/c home on oral abx and steroids  Home neb  F/u here next week

## 2019-02-13 NOTE — DISCHARGE SUMMARY
"[facility] Medicine  Discharge Summary      Patient Name: Elmer Cunha  MRN: [id number]  Admission Date: 2/11/2019  Hospital Length of Stay: 2 days  Discharge Date and Time: No discharge date for patient encounter.  Attending Physician: Sonny Cerna III, MD   Discharging Provider: Sonny Cerna III, MD  Primary Care Provider: Sonny Cerna III, MD      HPI:   No notes on file    * No surgery found *      Hospital Course:   2.13  Admitted with pneumonia and sob after failed op tx in office and ed.  Was unable to complete sentences due to sob  Treated with iv abx and steroids  Much improved  Today vss  Chest cta  A/p: pneumonia, arrhythmia  D/c home on oral abx and steroids  Home neb  F/u here next week     Consults:     * Pneumonia    2.12  Feeling better  Continue abx and steroids  Home when able  2.13  Admitted with pneumonia and sob after failed op tx in office and ed.  Was unable to complete sentences due to sob  Treated with iv abx and steroids  Much improved  Today vss  Chest cta  A/p: pneumonia, arrhythmia  D/c home on oral abx and steroids  Home neb  F/u here next week       Final Active Diagnoses:    Diagnosis Date Noted POA    PRINCIPAL PROBLEM:  Pneumonia [J18.9] 02/11/2019 Yes      Problems Resolved During this Admission:       Discharged Condition: fair    Disposition:     Follow Up:    Patient Instructions:      NEBULIZER FOR HOME USE     Order Specific Question Answer Comments   Height: 5' 11" (1.803 m)    Weight: 108.6 kg (239 lb 6.4 oz)    Does patient have medical equipment at home? cane, straight    Does patient have medical equipment at home? bedside commode    Does patient have medical equipment at home? walker, standard    Length of need (1-99 months): 99        Significant Diagnostic Studies: Labs: All labs within the past 24 hours have been reviewed    Pending Diagnostic Studies:     None         Medications:  Reconciled Home Medications:    " "  Medication List      START taking these medications    albuterol-ipratropium 2.5 mg-0.5 mg/3 mL nebulizer solution  Commonly known as:  DUO-NEB  Take 3 mLs by nebulization every 4 (four) hours. Rescue     doxycycline 100 MG Cap  Commonly known as:  VIBRAMYCIN  Take 1 capsule (100 mg total) by mouth 2 (two) times daily with meals.     pantoprazole 40 MG tablet  Commonly known as:  PROTONIX  Take 1 tablet (40 mg total) by mouth once daily.  Start taking on:  2/14/2019        CHANGE how you take these medications    predniSONE 10 MG tablet  Commonly known as:  DELTASONE  Take 2 tablets (20 mg total) by mouth once daily. for 5 days  What changed:    · medication strength  · how much to take        CONTINUE taking these medications    amLODIPine 5 MG tablet  Commonly known as:  NORVASC     atorvastatin 40 MG tablet  Commonly known as:  LIPITOR  Take 40 mg by mouth once daily.     CORDARONE 200 MG Tab  Generic drug:  amiodarone  Take 200 mg by mouth once daily.     docusate sodium 100 MG capsule  Commonly known as:  COLACE  Take 1 capsule (100 mg total) by mouth 2 (two) times daily. With 12 ounces of water     ELIQUIS 5 mg Tab  Generic drug:  apixaban  Take 5 mg by mouth 2 (two) times daily.     escitalopram oxalate 20 MG tablet  Commonly known as:  LEXAPRO  TAKE 1 TABLET BY MOUTH EACH DAY FOR MOOD "THANK YOU"     fluticasone 50 mcg/actuation nasal spray  Commonly known as:  FLONASE  2 sprays (100 mcg total) by Each Nare route once daily.     metoprolol succinate 25 MG 24 hr tablet  Commonly known as:  TOPROL-XL  Take 25 mg by mouth once daily. Take 1/2 tablet by mouth twice daily.     polyethylene glycol 17 gram/dose powder  Commonly known as:  GLYCOLAX  Take 17 g by mouth once daily.     tamsulosin 0.4 mg Cap  Commonly known as:  FLOMAX  TAKE 2 CAPSULES BY MOUTH EACH DAY FOR PROSTATE/URINARY "THANK YOU"     TRAVATAN Z 0.004 % ophthalmic solution  Generic drug:  travoprost        STOP taking these medications  "   levoFLOXacin 500 MG tablet  Commonly known as:  LEVAQUIN     metoprolol tartrate 25 MG tablet  Commonly known as:  LOPRESSOR     ranitidine 150 MG tablet  Commonly known as:  ZANTAC        ASK your doctor about these medications    albuterol 2.5 mg /3 mL (0.083 %) nebulizer solution  Commonly known as:  PROVENTIL  Take 3 mLs (2.5 mg total) by nebulization every 6 (six) hours as needed for Wheezing. Rescue            Indwelling Lines/Drains at time of discharge:   Lines/Drains/Airways          None          Time spent on the discharge of patient: 30 minutes  Patient was seen and examined on the date of discharge and determined to be suitable for discharge.         Sonny Cerna III, MD  Department of Hospital Medicine  [facility] - Med Surg

## 2019-02-13 NOTE — PLAN OF CARE
Problem: Fall Injury Risk  Goal: Absence of Fall and Fall-Related Injury    Intervention: Identify and Manage Contributors to Fall Injury Risk   02/12/19 1911   Manage Acute Allergic Reaction   Medication Review/Management medications reviewed

## 2019-02-13 NOTE — HOSPITAL COURSE
2.13  Admitted with pneumonia and sob after failed op tx in office and ed.  Was unable to complete sentences due to sob  Treated with iv abx and steroids  Much improved  Today vss  Chest cta  A/p: pneumonia, arrhythmia  D/c home on oral abx and steroids  Home neb  F/u here next week

## 2019-02-13 NOTE — PLAN OF CARE
Problem: Respiratory Compromise (Pneumonia)  Goal: Effective Oxygenation and Ventilation    Intervention: Promote Airway Secretion Clearance   02/12/19 0929 02/12/19 1911   Promote Airway Secretion Clearance   Breathing Techniques/Airway Clearance --  deep/controlled cough encouraged   Promote Airway Secretion Clearance   Cough And Deep Breathing done independently per patient --

## 2019-02-13 NOTE — NURSING
Discharge instructions reviewed with patient and daughter. Patient instructed on medications numerous times. Patient and daughter showed where to find the highlighted medication changes in the discharge packet. Patient verbalized understanding and was able to show nurse where the medication changes were located within the packet. Patient was assisted into wheelchair and escorted to POV with nurse and daughter at side. TAMMY.

## 2019-02-13 NOTE — PLAN OF CARE
Problem: Fluid Imbalance (Pneumonia)  Goal: Fluid Balance    Intervention: Monitor and Manage Fluid Balance   02/13/19 0648   Monitor and Manage Cardiac Rhythm Effects   Fluid/Electrolyte Management intravenous fluid replacement initiated

## 2019-02-13 NOTE — NURSING
1320- IV Catheter removed and intact. Pressure dressing applied. Awaiting CM to make follow up appointment with PCP.

## 2019-02-13 NOTE — PLAN OF CARE
02/13/19 1348   Medicare Message   Important Message from Medicare regarding Discharge Appeal Rights Given to patient/caregiver;Explained to patient/caregiver;Signed/date by patient/caregiver   Date IMM was signed 02/13/19   Time IMM was signed 1102   VIRY EXPLAINED IMM TO PT AND HE SIGNED THE FORM.

## 2019-02-13 NOTE — PLAN OF CARE
02/13/19 1349   Final Note   Assessment Type Final Discharge Note   Anticipated Discharge Disposition Home   What phone number can be called within the next 1-3 days to see how you are doing after discharge? [phone]   Hospital Follow Up  Appt(s) scheduled? Yes   Discharge plans and expectations educations in teach back method with documentation complete? Yes   PT IS DISCHARGED TODAY. SW RECEIVED ORDER FOR PT TO HAVE A NEBULIZER FOR HOME USE. SW HAS ARRANGED FOR NEBULIZER TO BE DELIVERED TO PT'S HOME. PT EXPRESSES NO OTHER DISCHARGE NEEDS. DAUGHTER WILL ASSIST PT AS NEEDED,

## 2019-02-14 NOTE — PLAN OF CARE
02/14/19 1644   Final Note   Assessment Type Final Discharge Note   PT Called sw after discharge to say his pharmacy could not fill his albuterol rx . sw decided to try to pay for his nebulizer and nebmeds with a voucher. [facility] was kind enough to give pt a nebulizer and called [facility] where pt usually gets his meds and have his nebmed rx transferred to them. sw called pt to go to [facility] and  nebulizer and nebmeds. hong later called pt to see if he had his nebulizer and his meds and he did. Success.

## 2019-03-06 ENCOUNTER — HOSPITAL ENCOUNTER (OUTPATIENT)
Dept: RADIOLOGY | Facility: HOSPITAL | Age: 64
Discharge: HOME | End: 2019-03-06
Attending: INTERNAL MEDICINE
Payer: MEDICARE

## 2019-03-06 ENCOUNTER — HOSPITAL ENCOUNTER (OUTPATIENT)
Dept: CARDIOLOGY | Facility: HOSPITAL | Age: 64
Discharge: HOME | End: 2019-03-06
Attending: INTERNAL MEDICINE
Payer: MEDICARE

## 2019-03-06 DIAGNOSIS — I10 ESSENTIAL HYPERTENSION: ICD-10-CM

## 2019-03-06 DIAGNOSIS — K21.9 GASTROESOPHAGEAL REFLUX DISEASE, ESOPHAGITIS PRESENCE NOT SPECIFIED: ICD-10-CM

## 2019-03-06 DIAGNOSIS — I48.20 CHRONIC ATRIAL FIBRILLATION: ICD-10-CM

## 2019-03-06 DIAGNOSIS — R06.02 SHORTNESS OF BREATH: ICD-10-CM

## 2019-03-06 DIAGNOSIS — R06.09 DOE (DYSPNEA ON EXERTION): ICD-10-CM

## 2019-03-06 DIAGNOSIS — R06.00 PND (PAROXYSMAL NOCTURNAL DYSPNEA): ICD-10-CM

## 2019-03-06 DIAGNOSIS — R05.9 COUGH: ICD-10-CM

## 2019-03-06 DIAGNOSIS — Z95.0 PACEMAKER: ICD-10-CM

## 2019-03-06 DIAGNOSIS — E78.00 HYPERCHOLESTEREMIA: ICD-10-CM

## 2019-03-06 LAB
AORTIC MEAN GRADIENT: ABNORMAL MM HG (ref 0–9.9)
AORTIC PEAK GRADIENT: ABNORMAL MM HG (ref 0–9.9)
AORTIC PEAK VELOCITY: ABNORMAL M/S (ref 0–1.9)
AORTIC PRES. HALF TIME: ABNORMAL M/SEC (ref 599–999)
AORTIC ROOT (DIASTOLE): 3.4 (ref 1.3–3.7)
AORTIC VALVE (DIASTOLE): 2 (ref 1.5–2.6)
AORTIC VALVE AREA: ABNORMAL CM2 (ref 2.5–3.5)
LEFT ATRIUM (DIASTOLE): 4 (ref 2.5–4)
LEFT VENTRICLE (DIASTOLE): 5.2 (ref 3.5–5.5)
LEFT VENTRICLE (SYSTOLE): 3.6 (ref 2.2–4)
MITRAL PRES. HALF TIME: ABNORMAL M/SEC (ref 30–60)
MITRAL VALVE AREA: ABNORMAL CM2 (ref 4–6)
MITRAL VALVE E-A RATIO: 1.24 (ref 0.75–999)
MITRAL VALVE E-E PRIME: ABNORMAL (ref 0–7)
POSTERIOR WALL (SYSTOLE): 1 (ref 0.5–1.2)
POSTERIOR WALL(DIASTOLE): 1.3 (ref 0.6–1.2)
PULMONARY ARTERY PRESSURE: ABNORMAL MM HG (ref 0–29)
RETIRED EF AND QEF - SEE NOTES: 55 %
RIGHT VENT (DIASTOLE): 2 (ref 0.8–2.6)
SEPTUM (DIASTOLE): 1.2 (ref 0.6–1.2)
SEPTUM (SYSTOLE): 1 (ref 0.5–1.1)

## 2019-03-20 PROBLEM — J22 LOWER RESPIRATORY TRACT INFECTION: Status: RESOLVED | Noted: 2019-02-11 | Resolved: 2019-03-20

## 2019-03-20 PROBLEM — R06.02 SHORTNESS OF BREATH: Status: RESOLVED | Noted: 2019-02-11 | Resolved: 2019-03-20

## 2019-03-20 PROBLEM — J18.9 PNEUMONIA: Status: RESOLVED | Noted: 2019-02-11 | Resolved: 2019-03-20

## 2019-04-23 ENCOUNTER — OFFICE VISIT (OUTPATIENT)
Dept: PODIATRY | Facility: CLINIC | Age: 64
End: 2019-04-23
Payer: MEDICARE

## 2019-04-23 VITALS
WEIGHT: 260 LBS | BODY MASS INDEX: 36.4 KG/M2 | SYSTOLIC BLOOD PRESSURE: 152 MMHG | HEART RATE: 79 BPM | RESPIRATION RATE: 18 BRPM | HEIGHT: 71 IN | DIASTOLIC BLOOD PRESSURE: 76 MMHG

## 2019-04-23 DIAGNOSIS — L60.0 INGROWN NAIL: ICD-10-CM

## 2019-04-23 DIAGNOSIS — M15.9 PRIMARY OSTEOARTHRITIS INVOLVING MULTIPLE JOINTS: Primary | ICD-10-CM

## 2019-04-23 PROCEDURE — 99999 PR PBB SHADOW E&M-EST. PATIENT-LVL III: CPT | Mod: PBBFAC,,, | Performed by: PODIATRIST

## 2019-04-23 PROCEDURE — 99999 PR PBB SHADOW E&M-EST. PATIENT-LVL III: ICD-10-PCS | Mod: PBBFAC,,, | Performed by: PODIATRIST

## 2019-04-23 PROCEDURE — 99213 PR OFFICE/OUTPT VISIT, EST, LEVL III, 20-29 MIN: ICD-10-PCS | Mod: S$PBB,,, | Performed by: PODIATRIST

## 2019-04-23 PROCEDURE — 99213 OFFICE O/P EST LOW 20 MIN: CPT | Mod: PBBFAC,PN | Performed by: PODIATRIST

## 2019-04-23 PROCEDURE — 99213 OFFICE O/P EST LOW 20 MIN: CPT | Mod: S$PBB,,, | Performed by: PODIATRIST

## 2019-04-27 NOTE — PROGRESS NOTES
Subjective:       Patient ID: Elmer Cunha is a 63 y.o. male.    Chief Complaint:       Patient is also complaining about a painful ingrown toenail on his left great toe.  Nail Problem   Associated symptoms include arthralgias and joint swelling.   Foot Pain   Associated symptoms include arthralgias and joint swelling.     Review of Systems   Musculoskeletal: Positive for arthralgias, gait problem and joint swelling.   All other systems reviewed and are negative.      Objective:      Physical Exam   Constitutional: He appears well-developed and well-nourished.   Cardiovascular: Normal rate, regular rhythm and normal heart sounds.   Pulses:       Dorsalis pedis pulses are 1+ on the right side, and 1+ on the left side.        Posterior tibial pulses are 1+ on the right side, and 1+ on the left side.   Pulmonary/Chest: Effort normal and breath sounds normal.   Musculoskeletal: He exhibits edema and tenderness.        Right foot: There is decreased range of motion.   Feet:   Right Foot:   Protective Sensation: 4 sites tested. 4 sites sensed.   Skin Integrity: Positive for erythema and warmth.   Left Foot:   Protective Sensation: 4 sites tested. 4 sites sensed.   Neurological: He is alert.   Skin: Skin is warm. Capillary refill takes 2 to 3 seconds.   Psychiatric: He has a normal mood and affect. His behavior is normal. Judgment and thought content normal.   Nursing note and vitals reviewed.        Patient is noted to have an ingrowing toenail secondary to fungal involvement with early signs of infection on the patient's left hallux tenderness and discomfort is noted  Assessment:       1. Primary osteoarthritis involving multiple joints    2. Ingrown nail        Plan:                 On evaluation patient has findings consistent with infected ingrown toenail of the left hallux there is some minor nail that is growing into the medial border with early signs of infection noted this required debridement however nail  avulsion is not necessary at this time patient needs to monitor this area closely.  Patient had diffuse callus formation which puts him at risk for ulceration no current signs of infection noted at this time in these areas. Total face-to-face time including discussion evaluation removal ingrown toenail from the left great toe and non excisional debridement of the hyperkeratotic lesions the put the patient at risk for ulceration equaled 15 min.  Patient's evaluation is not related to previous surgery on the right foot but the result of an ingrowing toenail on the left hallux unrelated evaluation management performed.  Patient relates that he read he recently had brain surgery to remove fluid on his brain successfully since I last saw him and has recovered very well.    This note was created using [employer] voice recognition software that occasionally misinterpreted phrases or words.

## 2019-05-02 ENCOUNTER — LAB VISIT (OUTPATIENT)
Dept: LAB | Facility: HOSPITAL | Age: 64
End: 2019-05-02
Attending: NURSE PRACTITIONER
Payer: MEDICARE

## 2019-05-02 DIAGNOSIS — R60.0 BILATERAL LOWER EXTREMITY EDEMA: ICD-10-CM

## 2019-05-02 DIAGNOSIS — R10.11 RIGHT UPPER QUADRANT ABDOMINAL PAIN: ICD-10-CM

## 2019-05-02 DIAGNOSIS — K59.00 CONSTIPATION, UNSPECIFIED CONSTIPATION TYPE: ICD-10-CM

## 2019-05-02 DIAGNOSIS — R10.13 EPIGASTRIC ABDOMINAL PAIN: ICD-10-CM

## 2019-05-02 LAB
ALBUMIN SERPL BCP-MCNC: 3.9 G/DL (ref 3.5–5.2)
ALP SERPL-CCNC: 129 U/L (ref 55–135)
ALT SERPL W/O P-5'-P-CCNC: 50 U/L (ref 10–44)
AMYLASE SERPL-CCNC: 42 U/L (ref 20–110)
ANION GAP SERPL CALC-SCNC: 8 MMOL/L (ref 8–16)
AST SERPL-CCNC: 28 U/L (ref 10–40)
BASOPHILS # BLD AUTO: 0.04 K/UL (ref 0–0.2)
BASOPHILS NFR BLD: 0.7 % (ref 0–1.9)
BILIRUB SERPL-MCNC: 0.7 MG/DL (ref 0.1–1)
BNP SERPL-MCNC: 23 PG/ML (ref 0–99)
BUN SERPL-MCNC: 20 MG/DL (ref 8–23)
CALCIUM SERPL-MCNC: 9.2 MG/DL (ref 8.7–10.5)
CHLORIDE SERPL-SCNC: 108 MMOL/L (ref 95–110)
CO2 SERPL-SCNC: 22 MMOL/L (ref 23–29)
CREAT SERPL-MCNC: 0.9 MG/DL (ref 0.5–1.4)
DIFFERENTIAL METHOD: ABNORMAL
EOSINOPHIL # BLD AUTO: 0.1 K/UL (ref 0–0.5)
EOSINOPHIL NFR BLD: 2.3 % (ref 0–8)
ERYTHROCYTE [DISTWIDTH] IN BLOOD BY AUTOMATED COUNT: 13.6 % (ref 11.5–14.5)
EST. GFR  (AFRICAN AMERICAN): >60 ML/MIN/1.73 M^2
EST. GFR  (NON AFRICAN AMERICAN): >60 ML/MIN/1.73 M^2
GLUCOSE SERPL-MCNC: 120 MG/DL (ref 70–110)
HCT VFR BLD AUTO: 39.2 % (ref 40–54)
HGB BLD-MCNC: 13.1 G/DL (ref 14–18)
IMM GRANULOCYTES # BLD AUTO: 0.02 K/UL (ref 0–0.04)
IMM GRANULOCYTES NFR BLD AUTO: 0.3 % (ref 0–0.5)
LIPASE SERPL-CCNC: 31 U/L (ref 4–60)
LYMPHOCYTES # BLD AUTO: 1.3 K/UL (ref 1–4.8)
LYMPHOCYTES NFR BLD: 20.8 % (ref 18–48)
MCH RBC QN AUTO: 29.3 PG (ref 27–31)
MCHC RBC AUTO-ENTMCNC: 33.4 G/DL (ref 32–36)
MCV RBC AUTO: 88 FL (ref 82–98)
MONOCYTES # BLD AUTO: 0.8 K/UL (ref 0.3–1)
MONOCYTES NFR BLD: 12.5 % (ref 4–15)
NEUTROPHILS # BLD AUTO: 3.9 K/UL (ref 1.8–7.7)
NEUTROPHILS NFR BLD: 63.4 % (ref 38–73)
NRBC BLD-RTO: 0 /100 WBC
PLATELET # BLD AUTO: 159 K/UL (ref 150–350)
PMV BLD AUTO: 10.1 FL (ref 9.2–12.9)
POTASSIUM SERPL-SCNC: 3.9 MMOL/L (ref 3.5–5.1)
PROT SERPL-MCNC: 6.4 G/DL (ref 6–8.4)
RBC # BLD AUTO: 4.47 M/UL (ref 4.6–6.2)
SODIUM SERPL-SCNC: 138 MMOL/L (ref 136–145)
WBC # BLD AUTO: 6.14 K/UL (ref 3.9–12.7)

## 2019-05-02 PROCEDURE — 80053 COMPREHEN METABOLIC PANEL: CPT

## 2019-05-02 PROCEDURE — 83880 ASSAY OF NATRIURETIC PEPTIDE: CPT

## 2019-05-02 PROCEDURE — 85025 COMPLETE CBC W/AUTO DIFF WBC: CPT

## 2019-05-02 PROCEDURE — 36415 COLL VENOUS BLD VENIPUNCTURE: CPT

## 2019-05-02 PROCEDURE — 83690 ASSAY OF LIPASE: CPT

## 2019-05-02 PROCEDURE — 82150 ASSAY OF AMYLASE: CPT

## 2019-05-03 ENCOUNTER — HOSPITAL ENCOUNTER (OUTPATIENT)
Dept: RADIOLOGY | Facility: HOSPITAL | Age: 64
Discharge: HOME | End: 2019-05-03
Attending: NURSE PRACTITIONER
Payer: MEDICARE

## 2019-05-03 DIAGNOSIS — K59.00 CONSTIPATION, UNSPECIFIED CONSTIPATION TYPE: ICD-10-CM

## 2019-05-03 DIAGNOSIS — R10.84 GENERALIZED ABDOMINAL PAIN: ICD-10-CM

## 2019-05-03 DIAGNOSIS — R10.11 RIGHT UPPER QUADRANT ABDOMINAL PAIN: ICD-10-CM

## 2019-05-03 DIAGNOSIS — R10.13 EPIGASTRIC ABDOMINAL PAIN: ICD-10-CM

## 2019-05-03 PROCEDURE — 74178 CT ABD&PLV WO CNTR FLWD CNTR: CPT | Mod: TC

## 2019-05-03 PROCEDURE — 25500020 PHARM REV CODE 255: Performed by: NURSE PRACTITIONER

## 2019-05-03 PROCEDURE — 74178 CT ABDOMEN PELVIS W WO CONTRAST: ICD-10-PCS | Mod: 26,,, | Performed by: RADIOLOGY

## 2019-05-03 PROCEDURE — 74178 CT ABD&PLV WO CNTR FLWD CNTR: CPT | Mod: 26,,, | Performed by: RADIOLOGY

## 2019-05-03 RX ADMIN — IOHEXOL 15 ML: 300 INJECTION, SOLUTION INTRAVENOUS at 07:05

## 2019-05-03 RX ADMIN — IOHEXOL 100 ML: 350 INJECTION, SOLUTION INTRAVENOUS at 08:05

## 2019-05-06 PROBLEM — Z86.010 PERSONAL HISTORY OF COLONIC POLYPS: Status: ACTIVE | Noted: 2019-05-06

## 2019-05-06 PROBLEM — Z86.0100 PERSONAL HISTORY OF COLONIC POLYPS: Status: ACTIVE | Noted: 2019-05-06

## 2019-05-06 PROBLEM — R17 ELEVATED BILIRUBIN: Status: RESOLVED | Noted: 2018-04-30 | Resolved: 2019-05-06

## 2019-05-06 PROBLEM — L60.0 INGROWN NAIL: Status: RESOLVED | Noted: 2018-11-13 | Resolved: 2019-05-06

## 2019-05-06 PROBLEM — R05.9 COUGH: Status: RESOLVED | Noted: 2019-02-11 | Resolved: 2019-05-06

## 2019-05-07 ENCOUNTER — TELEPHONE (OUTPATIENT)
Dept: SURGERY | Facility: CLINIC | Age: 64
End: 2019-05-07

## 2019-05-07 NOTE — TELEPHONE ENCOUNTER
Spoke with patient, patient scheduled next available 5/23/19 at 1PM. Patient verbalized understanding, appt date time and location confirmed.

## 2019-05-07 NOTE — TELEPHONE ENCOUNTER
----- Message from Johny Esqueda sent at 5/7/2019  9:05 AM CDT -----  Contact: same  Patient called in and stated he was referred by Dr. Cerna to call office and have a colonoscopy scheduled.  Patient call back number is [phone]

## 2019-05-20 ENCOUNTER — TELEPHONE (OUTPATIENT)
Dept: SURGERY | Facility: CLINIC | Age: 64
End: 2019-05-20

## 2019-05-20 NOTE — TELEPHONE ENCOUNTER
----- Message from Beto Yepez sent at 5/20/2019  2:06 PM CDT -----  Contact: pt  Type: Needs Medical Advice    Who Called:  pt    Best Call Back Number: [phone]  Additional Information: pt has questions about his upcoming appointment on 5/23/19. Pt wants to know what a consult visit consists of.  Please call to advise.

## 2019-05-20 NOTE — TELEPHONE ENCOUNTER
Writer spoke to pt and let him know that his consult visit will consist of MD going over his test results from his referring NP [place] due to his abdominal pain and full examination. Pt expressed verbal understanding and stated that he had a colonoscopy with Dr Goldstein 9 years ago. Pt agreed to stop by clinic and sign medical release of records so Dr Powell will have to review at time of consult. Writer expressed verbal understanding.

## 2019-05-23 ENCOUNTER — OFFICE VISIT (OUTPATIENT)
Dept: SURGERY | Facility: CLINIC | Age: 64
End: 2019-05-23
Payer: MEDICARE

## 2019-05-23 VITALS
HEIGHT: 71 IN | HEART RATE: 96 BPM | DIASTOLIC BLOOD PRESSURE: 58 MMHG | BODY MASS INDEX: 35.56 KG/M2 | SYSTOLIC BLOOD PRESSURE: 124 MMHG | OXYGEN SATURATION: 95 % | WEIGHT: 254 LBS | RESPIRATION RATE: 20 BRPM

## 2019-05-23 DIAGNOSIS — Z12.11 ENCOUNTER FOR SCREENING COLONOSCOPY: ICD-10-CM

## 2019-05-23 DIAGNOSIS — K44.9 HIATAL HERNIA: ICD-10-CM

## 2019-05-23 DIAGNOSIS — R10.84 GENERALIZED ABDOMINAL PAIN: Primary | ICD-10-CM

## 2019-05-23 DIAGNOSIS — K40.90 LEFT INGUINAL HERNIA: ICD-10-CM

## 2019-05-23 PROBLEM — K76.9 CHRONIC NONALCOHOLIC LIVER DISEASE: Status: ACTIVE | Noted: 2019-05-23

## 2019-05-23 PROCEDURE — 99205 PR OFFICE/OUTPT VISIT, NEW, LEVL V, 60-74 MIN: ICD-10-PCS | Mod: S$GLB,,, | Performed by: SURGERY

## 2019-05-23 PROCEDURE — 99205 OFFICE O/P NEW HI 60 MIN: CPT | Mod: S$GLB,,, | Performed by: SURGERY

## 2019-05-23 RX ORDER — SODIUM CHLORIDE, SODIUM LACTATE, POTASSIUM CHLORIDE, CALCIUM CHLORIDE 600; 310; 30; 20 MG/100ML; MG/100ML; MG/100ML; MG/100ML
INJECTION, SOLUTION INTRAVENOUS CONTINUOUS
Status: CANCELLED | OUTPATIENT
Start: 2019-05-23

## 2019-05-23 RX ORDER — LIDOCAINE HYDROCHLORIDE 10 MG/ML
1 INJECTION, SOLUTION EPIDURAL; INFILTRATION; INTRACAUDAL; PERINEURAL ONCE
Status: CANCELLED | OUTPATIENT
Start: 2019-05-23 | End: 2019-05-23

## 2019-05-23 RX ORDER — FUROSEMIDE 20 MG/1
TABLET ORAL
Refills: 0 | COMMUNITY
Start: 2019-05-14 | End: 2021-07-08 | Stop reason: SDUPTHER

## 2019-05-23 RX ORDER — SODIUM, POTASSIUM,MAG SULFATES 17.5-3.13G
SOLUTION, RECONSTITUTED, ORAL ORAL
Qty: 2 BOTTLE | Refills: 0 | Status: ON HOLD | OUTPATIENT
Start: 2019-05-23 | End: 2019-06-19 | Stop reason: HOSPADM

## 2019-05-23 NOTE — H&P (VIEW-ONLY)
[facility] - General Surgery  General Surgery  H&P      Patient Name: Elmer Cunha  MRN: [id number]     Chief Complaint:  I am being scoped today    HPI:  Mr. Cunha presents today as a consult from Lisa Cooksey NP-C.  He was experiencing multifocal abdominal pain and constipation.  Pain has resolved.  No nausea or vomiting.  No melena, hematochezia, hematemesis.  No weight loss.  No other associated symptoms.  He is due for screening colonoscopy.  No family history of colon cancer.  No previous colonoscopy.    Lab results were reviewed from 5/2/2019.  CBC showed no evidence of leukocytosis or thrombocytopenia he did have a mild normochromic normocytic anemia with a hemoglobin of 13.1 grams/deciliter.  Electrolytes revealed a mild hypocarbia, otherwise all electrolytes were in the range of normal. BUN and creatinine showed no evidence of renal dysfunction.  Glucose was minimally elevated.  Liver profile showed a very slight elevation of his ALT but no other evidence of hepatocellular disease or biliary obstruction. BNP was in the range of normal at 23 pg per mL.  Amylase and lipase showed no evidence of pancreatitis.    CT scan of the abdomen pelvis with and without contrast films and report reviewed from 5/3/2019.  There is considerable fecal retention suggestive of chronic constipation.  He also has a small hiatal hernia and left inguinal hernia.  Inguinal hernia is fat containing only.  Postoperative changes consistent with a cholecystectomy are noted as well. Chest x-ray films and report reviewed from 3/6/2019.  There is mild chronic interstitial lung disease and cardiomegaly.  He has a pacemaker in place. No other abnormalities were detected.      Allergies & Meds:     Allergen Reactions    Pcn [penicillins]      Had ? Reaction as child       Current Outpatient Medications   Medication Sig Dispense Refill    albuterol-ipratropium (DUO-NEB) 2.5 mg-0.5 mg/3 mL nebulizer solution  "Take 3 mLs by nebulization every 4 (four) hours. Rescue 120 vial 5    amiodarone (CORDARONE) 200 MG Tab Take 200 mg by mouth once daily.      amLODIPine (NORVASC) 5 MG tablet   11    apixaban (ELIQUIS) 5 mg Tab Take 5 mg by mouth 2 (two) times daily.      atorvastatin (LIPITOR) 40 MG tablet Take 40 mg by mouth once daily.      docusate sodium (COLACE) 100 MG capsule Take 1 capsule (100 mg total) by mouth 2 (two) times daily. With 12 ounces of water 60 capsule 5    escitalopram oxalate (LEXAPRO) 20 MG tablet TAKE 1 TABLET BY MOUTH EACH DAY FOR MOOD "THANK YOU" 30 tablet 5    furosemide (LASIX) 20 MG tablet   0    metoprolol succinate (TOPROL-XL) 25 MG 24 hr tablet Take 25 mg by mouth once daily. Take 1/2 tablet by mouth twice daily.      pantoprazole (PROTONIX) 40 MG tablet Take 1 tablet (40 mg total) by mouth once daily. 30 tablet 11    polyethylene glycol (GLYCOLAX) 17 gram/dose powder Take 17 g by mouth once daily. 595 g 3    tamsulosin (FLOMAX) 0.4 mg Cap TAKE 2 CAPSULES BY MOUTH EACH DAY FOR PROSTATE/URINARY "THANK YOU" 60 capsule 5    TRAVATAN Z 0.004 % ophthalmic solution   3    sodium,potassium,mag sulfates (SUPREP BOWEL PREP KIT) 17.5-3.13-1.6 gram SolR Follow written instructions provided by Clinic 2 Bottle 0         PMFSHx:  Past Medical History:   Diagnosis Date    Depression     Glaucoma     Hypercholesteremia     Hypertension     Stroke        Past Surgical History:   Procedure Laterality Date    ARTHROPLASTY, FOOT Right 11/16/2018    Performed by [facility]ay Cox D[facility] at [facility] OR    ATRIAL CARDIAC PACEMAKER INSERTION      CARDIAC CATHETERIZATION  10/25/2017    all arteries "patent"    CARDIAC PACEMAKER PLACEMENT  03/2017    CARDIAC PACEMAKER PLACEMENT      COLONOSCOPY  2013    Dr. Triston NAGY, NAIL MATRIX Right 11/16/2018    Performed by Nilay Cox DPM at [facility] OR       Family History   Problem Relation Age of Onset    Stroke Brother     Alzheimer's disease " Maternal Grandfather        Social History     Tobacco Use    Smoking status: Never Smoker    Smokeless tobacco: Never Used   Substance Use Topics    Alcohol use: Yes     Comment: Rarely    Drug use: No       Review of Systems   Constitutional: Negative for appetite change, chills, fatigue, fever and unexpected weight change.   HENT: Negative for congestion, dental problem, ear pain, mouth sores, postnasal drip, rhinorrhea, sore throat, tinnitus, trouble swallowing and voice change.    Eyes: Negative for photophobia, pain, discharge and visual disturbance.   Respiratory: Negative for cough, chest tightness, shortness of breath and wheezing.    Cardiovascular: Negative for chest pain, palpitations and leg swelling.   Gastrointestinal: Negative for blood in stool, diarrhea, nausea and vomiting.   Endocrine: Negative for cold intolerance, heat intolerance, polydipsia, polyphagia and polyuria.   Genitourinary: Negative for difficulty urinating, dysuria, flank pain, frequency, hematuria and urgency.   Musculoskeletal: Negative for arthralgias, joint swelling and myalgias.   Skin: Negative for color change and rash.   Allergic/Immunologic: Negative for immunocompromised state.   Neurological: Negative for dizziness, tremors, seizures, syncope, speech difficulty, weakness, numbness and headaches.   Hematological: Negative for adenopathy. Does not bruise/bleed easily.   Psychiatric/Behavioral: Negative for agitation, confusion, hallucinations, self-injury and suicidal ideas. The patient is not nervous/anxious.           Physical Exam   Constitutional: He is oriented to person, place, and time. He appears well-developed and well-nourished. He is active.  Non-toxic appearance. He does not appear ill. No distress.  Body mass index is 35.43 kg/m².   HENT: Head: Normocephalic and atraumatic. Head is without contusion.   Right Ear: Hearing and external ear normal. No drainage or tenderness.   Left Ear: Hearing and external  ear normal. No drainage or tenderness.   Nose: Nose normal. No rhinorrhea.   Eyes: Pupils are equal, round, and reactive to light. Conjunctivae and lids are normal. Right eye exhibits no discharge and no exudate. Left eye exhibits no discharge and no exudate. Right conjunctiva is not injected. Left conjunctiva is not injected.   Neck: Trachea normal, normal range of motion and phonation normal. No JVD present. Carotid bruit is not present. No tracheal deviation and no edema present. No thyroid mass and no thyromegaly present.   Cardiovascular: Normal rate, regular rhythm, S1 normal, S2 normal, normal heart sounds and intact distal pulses. PMI is not displaced. Exam reveals no gallop and no friction rub.   No murmur heard.  Pulmonary/Chest: Effort normal and breath sounds normal. No accessory muscle usage. No tachypnea. No respiratory distress. He exhibits no mass, no tenderness, no crepitus and no retraction. Right breast exhibits no inverted nipple, no mass, no nipple discharge and no skin change. Left breast exhibits no inverted nipple, no mass, no nipple discharge and no skin change. Breasts are symmetrical.   Abdominal: Soft. Normal appearance and bowel sounds are normal. He exhibits no distension, no fluid wave, no abdominal bruit and no mass. There is no hepatosplenomegaly. There is no tenderness. No hernia. Hernia confirmed negative in the ventral area, confirmed negative in the right inguinal area and confirmed negative in the left inguinal area.   Genitourinary: Testes normal and penis normal. Right testis shows no mass and no swelling. Left testis shows no mass and no swelling.   Musculoskeletal:        Cervical back: Normal.        Thoracic back: Normal.        Lumbar back: Normal.        Right upper arm: Normal.        Left upper arm: Normal.        Right forearm: Normal.        Left forearm: Normal.        Right hand: Normal.        Left hand: Normal.        Right upper leg: Normal.        Left upper  leg: Normal.        Right lower leg: Normal.        Left lower leg: Normal.        Right foot: Normal.        Left foot: Normal.   Lymphadenopathy:        Head (right side): No submental and no submandibular adenopathy present.        Head (left side): No submental and no submandibular adenopathy present.     He has no cervical adenopathy.     He has no axillary adenopathy. No inguinal adenopathy noted on the right or left side.        Right: No inguinal and no supraclavicular adenopathy present.        Left: No inguinal and no supraclavicular adenopathy present.   Neurological: He is alert and oriented to person, place, and time. He has normal strength. He is not disoriented. He displays no atrophy and no tremor. No cranial nerve deficit or sensory deficit. Coordination and gait normal. GCS eye subscore is 4. GCS verbal subscore is 5. GCS motor subscore is 6.   Skin: Skin is warm, dry and intact. No lesion and no rash noted. No cyanosis. Nails show no clubbing.   Psychiatric: He has a normal mood and affect. His speech is normal and behavior is normal. Thought content normal. He is not actively hallucinating. He is attentive.         Test Results:  See above    Assessment:       Generalized abdominal pain, episodic, currently resolved.  Hiatal hernia. Asymptomatic left inguinal hernia. Due for screening colonoscopy.  Differential diagnosis includes but is not limited to esophageal neoplasm, esophagitis, esophageal ulcer, gastroesophageal reflux disease, gastritis, gastric ulcer, gastric cancer, duodenitis, duodenal ulcer, inflammatory bowel disease, diverticulosis, hemorrhoids, gastrointestinal angiodysplasias, benign gastrointestinal neoplasm, colon cancer, etc.  Options at this time include but are not limited to endoscopy, second opinion, capsule endoscopy, radiologic studies, observation, etc.  Multiple comorbid issues ( anticoagulated, pacemaker, atrial fibrillation hyperlipidemia, hypertension, GERD, hepatic  steatosis ) increase the complexity of required perioperative evaluation & management, risks of complications, and likely will increase the difficulty and complexity of postoperative care, etc.  These issues must be factored in to preoperative evaluation and perioperative management.    1. Generalized abdominal pain    2. Hiatal hernia    3. Left inguinal hernia    4. Encounter for screening colonoscopy        Plan:   Generalized abdominal pain    Hiatal hernia    Left inguinal hernia    Encounter for screening colonoscopy  -     Case Request Operating Room: COLONOSCOPY, ESOPHAGOGASTRODUODENOSCOPY (EGD)    Other orders  -     sodium,potassium,mag sulfates (SUPREP BOWEL PREP KIT) 17.5-3.13-1.6 gram SolR; Follow written instructions provided by Clinic  Dispense: 2 Bottle; Refill: 0        Eliquis stopped on 6/15/19     Follow up around 7/4/2019 for routine post-endosocpy visit.    Counseling/Medical Decision Making:  Elmer Cunha was counseled regarding the results of the evaluation thus far and the differential diagnosis.  Diagnostic and therapeutic options were discussed in detail along with the risks, benefits, possible complications, details of, and indications for each option.  Diagnoses discussed included but were not limited to: GB disease, GERD, hiatal hernia, PUD, gastritis, duodenitis, Sphincter of Oddi dysfunction, hemorrhoids, diverticulosis, cancer, IBD, IBS, benign tumors, angiodysplasias, ischemic disease.  Options discussed included but were not limited to: EGD, colonoscopy, proctoscopy, anoscopy, sigmoidoscopy, radiologic studies, PillCam, empiric therapy, second opinion, etc. Possible complications of endoscopy discussed included but were not limited to: bleeding, infections, endocarditis, injury to internal organs, incomplete exam, failure to diagnose cancer or other serious condition, need for emergency surgery, need for a temporary colostomy, need for additional operations or procedures, etc.   Entire conversation was held in layman's terms.  All unfamiliar terms were defined.  Questions solicited and answered.  I read the consent form to him verbatim.  Donnie booklets were provided on EGD, GERD, GB disease / surgery, colonoscopy, polyps, diverticulosis, hemorrhoids, cancer, etc.  A copy of the consent form was provided for his review outside the office / hospital prior to the procedures.  At the conclusion of the conversation he voiced complete understanding of all we discussed and satisfaction that all of his questions had been answered to his full understanding.  He stated that he felt fully informed and completely capable of making an informed decision.  He requests and desires to proceed with EGD and colonoscopy.

## 2019-05-23 NOTE — PROGRESS NOTES
Subjective:       Patient ID: Elmer Cunha is a 63 y.o. male.    Chief Complaint: Consult ([facility])      HPI:  Mr. Cunha presents today as a consult from Lisa Cooksey NP-C.  He was experiencing multifocal abdominal pain and constipation.  Pain has resolved.  No nausea or vomiting.  No melena, hematochezia, hematemesis.  No weight loss.  No other associated symptoms.  He is due for screening colonoscopy.  No family history of colon cancer.  No previous colonoscopy.    Lab results were reviewed from 5/2/2019.  CBC showed no evidence of leukocytosis or thrombocytopenia he did have a mild normochromic normocytic anemia with a hemoglobin of 13.1 grams/deciliter.  Electrolytes revealed a mild hypocarbia, otherwise all electrolytes were in the range of normal. BUN and creatinine showed no evidence of renal dysfunction.  Glucose was minimally elevated.  Liver profile showed a very slight elevation of his ALT but no other evidence of hepatocellular disease or biliary obstruction. BNP was in the range of normal at 23 pg per mL.  Amylase and lipase showed no evidence of pancreatitis.    CT scan of the abdomen pelvis with and without contrast films and report reviewed from 5/3/2019.  There is considerable fecal retention suggestive of chronic constipation.  He also has a small hiatal hernia and left inguinal hernia.  Inguinal hernia is fat containing only.  Postoperative changes consistent with a cholecystectomy are noted as well. Chest x-ray films and report reviewed from 3/6/2019.  There is mild chronic interstitial lung disease and cardiomegaly.  He has a pacemaker in place. No other abnormalities were detected.      Allergies & Meds:  Review of patient's allergies indicates:   Allergen Reactions    Pcn [penicillins]      Had ? Reaction as child       Current Outpatient Medications   Medication Sig Dispense Refill    albuterol-ipratropium (DUO-NEB) 2.5 mg-0.5 mg/3 mL nebulizer solution  "Take 3 mLs by nebulization every 4 (four) hours. Rescue 120 vial 5    amiodarone (CORDARONE) 200 MG Tab Take 200 mg by mouth once daily.      amLODIPine (NORVASC) 5 MG tablet   11    apixaban (ELIQUIS) 5 mg Tab Take 5 mg by mouth 2 (two) times daily.      atorvastatin (LIPITOR) 40 MG tablet Take 40 mg by mouth once daily.      docusate sodium (COLACE) 100 MG capsule Take 1 capsule (100 mg total) by mouth 2 (two) times daily. With 12 ounces of water 60 capsule 5    escitalopram oxalate (LEXAPRO) 20 MG tablet TAKE 1 TABLET BY MOUTH EACH DAY FOR MOOD "THANK YOU" 30 tablet 5    furosemide (LASIX) 20 MG tablet   0    metoprolol succinate (TOPROL-XL) 25 MG 24 hr tablet Take 25 mg by mouth once daily. Take 1/2 tablet by mouth twice daily.      pantoprazole (PROTONIX) 40 MG tablet Take 1 tablet (40 mg total) by mouth once daily. 30 tablet 11    polyethylene glycol (GLYCOLAX) 17 gram/dose powder Take 17 g by mouth once daily. 595 g 3    tamsulosin (FLOMAX) 0.4 mg Cap TAKE 2 CAPSULES BY MOUTH EACH DAY FOR PROSTATE/URINARY "THANK YOU" 60 capsule 5    TRAVATAN Z 0.004 % ophthalmic solution   3    sodium,potassium,mag sulfates (SUPREP BOWEL PREP KIT) 17.5-3.13-1.6 gram SolR Follow written instructions provided by Clinic 2 Bottle 0     No current facility-administered medications for this visit.        PMFSHx:  Past Medical History:   Diagnosis Date    Depression     Glaucoma     Hypercholesteremia     Hypertension     Stroke        Past Surgical History:   Procedure Laterality Date    ARTHROPLASTY, FOOT Right 11/16/2018    Per[facility]ay [facility] D[facility] at [facility]    ATRIAL CARDIAC PACEMAKER INSERTION      CARDIAC CATHETERIZATION  10/25/2017    all arteries "patent"    CARDIAC PACEMAKER PLACEMENT  03/2017    CARDIAC PACEMAKER PLACEMENT      COLONOSCOPY  2013    Dr. Triston NAGY, NAIL MATRIX Right 11/16/2018    Performed by Nilay Cox DPM at [facility] OR       Family History   Problem " Relation Age of Onset    Stroke Brother     Alzheimer's disease Maternal Grandfather        Social History     Tobacco Use    Smoking status: Never Smoker    Smokeless tobacco: Never Used   Substance Use Topics    Alcohol use: Yes     Comment: Rarely    Drug use: No       Review of Systems   Constitutional: Negative for appetite change, chills, fatigue, fever and unexpected weight change.   HENT: Negative for congestion, dental problem, ear pain, mouth sores, postnasal drip, rhinorrhea, sore throat, tinnitus, trouble swallowing and voice change.    Eyes: Negative for photophobia, pain, discharge and visual disturbance.   Respiratory: Negative for cough, chest tightness, shortness of breath and wheezing.    Cardiovascular: Negative for chest pain, palpitations and leg swelling.   Gastrointestinal: Negative for blood in stool, diarrhea, nausea and vomiting.   Endocrine: Negative for cold intolerance, heat intolerance, polydipsia, polyphagia and polyuria.   Genitourinary: Negative for difficulty urinating, dysuria, flank pain, frequency, hematuria and urgency.   Musculoskeletal: Negative for arthralgias, joint swelling and myalgias.   Skin: Negative for color change and rash.   Allergic/Immunologic: Negative for immunocompromised state.   Neurological: Negative for dizziness, tremors, seizures, syncope, speech difficulty, weakness, numbness and headaches.   Hematological: Negative for adenopathy. Does not bruise/bleed easily.   Psychiatric/Behavioral: Negative for agitation, confusion, hallucinations, self-injury and suicidal ideas. The patient is not nervous/anxious.        Objective:      Physical Exam   Constitutional: He is oriented to person, place, and time. He appears well-developed and well-nourished. He is active.  Non-toxic appearance. He does not appear ill. No distress.   Body mass index is 35.43 kg/m².     HENT:   Head: Normocephalic and atraumatic. Head is without contusion.   Right Ear: Hearing and  external ear normal. No drainage or tenderness.   Left Ear: Hearing and external ear normal. No drainage or tenderness.   Nose: Nose normal. No rhinorrhea.   Eyes: Pupils are equal, round, and reactive to light. Conjunctivae and lids are normal. Right eye exhibits no discharge and no exudate. Left eye exhibits no discharge and no exudate. Right conjunctiva is not injected. Left conjunctiva is not injected.   Neck: Trachea normal, normal range of motion and phonation normal. No JVD present. Carotid bruit is not present. No tracheal deviation and no edema present. No thyroid mass and no thyromegaly present.   Cardiovascular: Normal rate, regular rhythm, S1 normal, S2 normal, normal heart sounds and intact distal pulses. PMI is not displaced. Exam reveals no gallop and no friction rub.   No murmur heard.  Pulmonary/Chest: Effort normal and breath sounds normal. No accessory muscle usage. No tachypnea. No respiratory distress. He exhibits no mass, no tenderness, no crepitus and no retraction. Right breast exhibits no inverted nipple, no mass, no nipple discharge and no skin change. Left breast exhibits no inverted nipple, no mass, no nipple discharge and no skin change. Breasts are symmetrical.   Abdominal: Soft. Normal appearance and bowel sounds are normal. He exhibits no distension, no fluid wave, no abdominal bruit and no mass. There is no hepatosplenomegaly. There is no tenderness. No hernia. Hernia confirmed negative in the ventral area, confirmed negative in the right inguinal area and confirmed negative in the left inguinal area.   Genitourinary: Testes normal and penis normal. Right testis shows no mass and no swelling. Left testis shows no mass and no swelling.   Musculoskeletal:        Cervical back: Normal.        Thoracic back: Normal.        Lumbar back: Normal.        Right upper arm: Normal.        Left upper arm: Normal.        Right forearm: Normal.        Left forearm: Normal.        Right hand:  Normal.        Left hand: Normal.        Right upper leg: Normal.        Left upper leg: Normal.        Right lower leg: Normal.        Left lower leg: Normal.        Right foot: Normal.        Left foot: Normal.   Lymphadenopathy:        Head (right side): No submental and no submandibular adenopathy present.        Head (left side): No submental and no submandibular adenopathy present.     He has no cervical adenopathy.     He has no axillary adenopathy. No inguinal adenopathy noted on the right or left side.        Right: No inguinal and no supraclavicular adenopathy present.        Left: No inguinal and no supraclavicular adenopathy present.   Neurological: He is alert and oriented to person, place, and time. He has normal strength. He is not disoriented. He displays no atrophy and no tremor. No cranial nerve deficit or sensory deficit. Coordination and gait normal. GCS eye subscore is 4. GCS verbal subscore is 5. GCS motor subscore is 6.   Skin: Skin is warm, dry and intact. No lesion and no rash noted. No cyanosis. Nails show no clubbing.   Psychiatric: He has a normal mood and affect. His speech is normal and behavior is normal. Thought content normal. He is not actively hallucinating. He is attentive.         Test Results:  See above    Assessment:       Generalized abdominal pain, episodic, currently resolved.  Hiatal hernia. Asymptomatic left inguinal hernia. Due for screening colonoscopy.  Differential diagnosis includes but is not limited to esophageal neoplasm, esophagitis, esophageal ulcer, gastroesophageal reflux disease, gastritis, gastric ulcer, gastric cancer, duodenitis, duodenal ulcer, inflammatory bowel disease, diverticulosis, hemorrhoids, gastrointestinal angiodysplasias, benign gastrointestinal neoplasm, colon cancer, etc.  Options at this time include but are not limited to endoscopy, second opinion, capsule endoscopy, radiologic studies, observation, etc.  Multiple comorbid issues (  anticoagulated, pacemaker, atrial fibrillation hyperlipidemia, hypertension, GERD, hepatic steatosis ) increase the complexity of required perioperative evaluation & management, risks of complications, and likely will increase the difficulty and complexity of postoperative care, etc.  These issues must be factored in to preoperative evaluation and perioperative management.    1. Generalized abdominal pain    2. Hiatal hernia    3. Left inguinal hernia    4. Encounter for screening colonoscopy        Plan:   Generalized abdominal pain    Hiatal hernia    Left inguinal hernia    Encounter for screening colonoscopy  -     Case Request Operating Room: COLONOSCOPY, ESOPHAGOGASTRODUODENOSCOPY (EGD)    Other orders  -     sodium,potassium,mag sulfates (SUPREP BOWEL PREP KIT) 17.5-3.13-1.6 gram SolR; Follow written instructions provided by Clinic  Dispense: 2 Bottle; Refill: 0    Mr. Cunha will stop the Eliquis on 6/15/19    Follow up in about 6 weeks (around 7/4/2019) for routine post-endosocpy visit.    Counseling/Medical Decision Making:  Elmer Cunha was counseled regarding the results of the evaluation thus far and the differential diagnosis.  Diagnostic and therapeutic options were discussed in detail along with the risks, benefits, possible complications, details of, and indications for each option.  Diagnoses discussed included but were not limited to: GB disease, GERD, hiatal hernia, PUD, gastritis, duodenitis, Sphincter of Oddi dysfunction, hemorrhoids, diverticulosis, cancer, IBD, IBS, benign tumors, angiodysplasias, ischemic disease.  Options discussed included but were not limited to: EGD, colonoscopy, proctoscopy, anoscopy, sigmoidoscopy, radiologic studies, PillCam, empiric therapy, second opinion, etc. Possible complications of endoscopy discussed included but were not limited to: bleeding, infections, endocarditis, injury to internal organs, incomplete exam, failure to diagnose cancer or other serious  condition, need for emergency surgery, need for a temporary colostomy, need for additional operations or procedures, etc.  Entire conversation was held in layman's terms.  All unfamiliar terms were defined.  Questions solicited and answered.  I read the consent form to him verbatim.  Donnie booklets were provided on EGD, GERD, GB disease / surgery, colonoscopy, polyps, diverticulosis, hemorrhoids, cancer, etc.  A copy of the consent form was provided for his review outside the office / hospital prior to the procedures.  At the conclusion of the conversation he voiced complete understanding of all we discussed and satisfaction that all of his questions had been answered to his full understanding.  He stated that he felt fully informed and completely capable of making an informed decision.  He requests and desires to proceed with EGD and colonoscopy.    Total face-to-face encounter time was 60 minutes, 40 minutes spent counseling as outlined/summarized above.

## 2019-05-23 NOTE — H&P
[facility] - General Surgery  General Surgery  H&P      Patient Name: Elmer Cunha  MRN: [id number]     Chief Complaint:  I am being scoped today    HPI:  Mr. Cunha presents today as a consult from Lisa Cooksey NP-C.  He was experiencing multifocal abdominal pain and constipation.  Pain has resolved.  No nausea or vomiting.  No melena, hematochezia, hematemesis.  No weight loss.  No other associated symptoms.  He is due for screening colonoscopy.  No family history of colon cancer.  No previous colonoscopy.    Lab results were reviewed from 5/2/2019.  CBC showed no evidence of leukocytosis or thrombocytopenia he did have a mild normochromic normocytic anemia with a hemoglobin of 13.1 grams/deciliter.  Electrolytes revealed a mild hypocarbia, otherwise all electrolytes were in the range of normal. BUN and creatinine showed no evidence of renal dysfunction.  Glucose was minimally elevated.  Liver profile showed a very slight elevation of his ALT but no other evidence of hepatocellular disease or biliary obstruction. BNP was in the range of normal at 23 pg per mL.  Amylase and lipase showed no evidence of pancreatitis.    CT scan of the abdomen pelvis with and without contrast films and report reviewed from 5/3/2019.  There is considerable fecal retention suggestive of chronic constipation.  He also has a small hiatal hernia and left inguinal hernia.  Inguinal hernia is fat containing only.  Postoperative changes consistent with a cholecystectomy are noted as well. Chest x-ray films and report reviewed from 3/6/2019.  There is mild chronic interstitial lung disease and cardiomegaly.  He has a pacemaker in place. No other abnormalities were detected.      Allergies & Meds:     Allergen Reactions    Pcn [penicillins]      Had ? Reaction as child       Current Outpatient Medications   Medication Sig Dispense Refill    albuterol-ipratropium (DUO-NEB) 2.5 mg-0.5 mg/3 mL nebulizer solution  "Take 3 mLs by nebulization every 4 (four) hours. Rescue 120 vial 5    amiodarone (CORDARONE) 200 MG Tab Take 200 mg by mouth once daily.      amLODIPine (NORVASC) 5 MG tablet   11    apixaban (ELIQUIS) 5 mg Tab Take 5 mg by mouth 2 (two) times daily.      atorvastatin (LIPITOR) 40 MG tablet Take 40 mg by mouth once daily.      docusate sodium (COLACE) 100 MG capsule Take 1 capsule (100 mg total) by mouth 2 (two) times daily. With 12 ounces of water 60 capsule 5    escitalopram oxalate (LEXAPRO) 20 MG tablet TAKE 1 TABLET BY MOUTH EACH DAY FOR MOOD "THANK YOU" 30 tablet 5    furosemide (LASIX) 20 MG tablet   0    metoprolol succinate (TOPROL-XL) 25 MG 24 hr tablet Take 25 mg by mouth once daily. Take 1/2 tablet by mouth twice daily.      pantoprazole (PROTONIX) 40 MG tablet Take 1 tablet (40 mg total) by mouth once daily. 30 tablet 11    polyethylene glycol (GLYCOLAX) 17 gram/dose powder Take 17 g by mouth once daily. 595 g 3    tamsulosin (FLOMAX) 0.4 mg Cap TAKE 2 CAPSULES BY MOUTH EACH DAY FOR PROSTATE/URINARY "THANK YOU" 60 capsule 5    TRAVATAN Z 0.004 % ophthalmic solution   3    sodium,potassium,mag sulfates (SUPREP BOWEL PREP KIT) 17.5-3.13-1.6 gram SolR Follow written instructions provided by Clinic 2 Bottle 0         PMFSHx:  Past Medical History:   Diagnosis Date    Depression     Glaucoma     Hypercholesteremia     Hypertension     Stroke        Past Surgical History:   Procedure Laterality Date    ARTHROPLASTY, FOOT Right 11/16/2018    Performed by [facility]ay Cox [facility] at [facility]    ATRIAL CARDIAC PACEMAKER INSERTION      CARDIAC CATHETERIZATION  10/25/2017    all arteries "patent"    CARDIAC PACEMAKER PLACEMENT  03/2017    CARDIAC PACEMAKER PLACEMENT      COLONOSCOPY  2013    Dr. Triston NAGY, NAIL MATRIX Right 11/16/2018    Performed by Nilay Cox DPM at [facility] OR       Family History   Problem Relation Age of Onset    Stroke Brother     Alzheimer's disease " Maternal Grandfather        Social History     Tobacco Use    Smoking status: Never Smoker    Smokeless tobacco: Never Used   Substance Use Topics    Alcohol use: Yes     Comment: Rarely    Drug use: No       Review of Systems   Constitutional: Negative for appetite change, chills, fatigue, fever and unexpected weight change.   HENT: Negative for congestion, dental problem, ear pain, mouth sores, postnasal drip, rhinorrhea, sore throat, tinnitus, trouble swallowing and voice change.    Eyes: Negative for photophobia, pain, discharge and visual disturbance.   Respiratory: Negative for cough, chest tightness, shortness of breath and wheezing.    Cardiovascular: Negative for chest pain, palpitations and leg swelling.   Gastrointestinal: Negative for blood in stool, diarrhea, nausea and vomiting.   Endocrine: Negative for cold intolerance, heat intolerance, polydipsia, polyphagia and polyuria.   Genitourinary: Negative for difficulty urinating, dysuria, flank pain, frequency, hematuria and urgency.   Musculoskeletal: Negative for arthralgias, joint swelling and myalgias.   Skin: Negative for color change and rash.   Allergic/Immunologic: Negative for immunocompromised state.   Neurological: Negative for dizziness, tremors, seizures, syncope, speech difficulty, weakness, numbness and headaches.   Hematological: Negative for adenopathy. Does not bruise/bleed easily.   Psychiatric/Behavioral: Negative for agitation, confusion, hallucinations, self-injury and suicidal ideas. The patient is not nervous/anxious.           Physical Exam   Constitutional: He is oriented to person, place, and time. He appears well-developed and well-nourished. He is active.  Non-toxic appearance. He does not appear ill. No distress.  Body mass index is 35.43 kg/m².   HENT: Head: Normocephalic and atraumatic. Head is without contusion.   Right Ear: Hearing and external ear normal. No drainage or tenderness.   Left Ear: Hearing and external  ear normal. No drainage or tenderness.   Nose: Nose normal. No rhinorrhea.   Eyes: Pupils are equal, round, and reactive to light. Conjunctivae and lids are normal. Right eye exhibits no discharge and no exudate. Left eye exhibits no discharge and no exudate. Right conjunctiva is not injected. Left conjunctiva is not injected.   Neck: Trachea normal, normal range of motion and phonation normal. No JVD present. Carotid bruit is not present. No tracheal deviation and no edema present. No thyroid mass and no thyromegaly present.   Cardiovascular: Normal rate, regular rhythm, S1 normal, S2 normal, normal heart sounds and intact distal pulses. PMI is not displaced. Exam reveals no gallop and no friction rub.   No murmur heard.  Pulmonary/Chest: Effort normal and breath sounds normal. No accessory muscle usage. No tachypnea. No respiratory distress. He exhibits no mass, no tenderness, no crepitus and no retraction. Right breast exhibits no inverted nipple, no mass, no nipple discharge and no skin change. Left breast exhibits no inverted nipple, no mass, no nipple discharge and no skin change. Breasts are symmetrical.   Abdominal: Soft. Normal appearance and bowel sounds are normal. He exhibits no distension, no fluid wave, no abdominal bruit and no mass. There is no hepatosplenomegaly. There is no tenderness. No hernia. Hernia confirmed negative in the ventral area, confirmed negative in the right inguinal area and confirmed negative in the left inguinal area.   Genitourinary: Testes normal and penis normal. Right testis shows no mass and no swelling. Left testis shows no mass and no swelling.   Musculoskeletal:        Cervical back: Normal.        Thoracic back: Normal.        Lumbar back: Normal.        Right upper arm: Normal.        Left upper arm: Normal.        Right forearm: Normal.        Left forearm: Normal.        Right hand: Normal.        Left hand: Normal.        Right upper leg: Normal.        Left upper  leg: Normal.        Right lower leg: Normal.        Left lower leg: Normal.        Right foot: Normal.        Left foot: Normal.   Lymphadenopathy:        Head (right side): No submental and no submandibular adenopathy present.        Head (left side): No submental and no submandibular adenopathy present.     He has no cervical adenopathy.     He has no axillary adenopathy. No inguinal adenopathy noted on the right or left side.        Right: No inguinal and no supraclavicular adenopathy present.        Left: No inguinal and no supraclavicular adenopathy present.   Neurological: He is alert and oriented to person, place, and time. He has normal strength. He is not disoriented. He displays no atrophy and no tremor. No cranial nerve deficit or sensory deficit. Coordination and gait normal. GCS eye subscore is 4. GCS verbal subscore is 5. GCS motor subscore is 6.   Skin: Skin is warm, dry and intact. No lesion and no rash noted. No cyanosis. Nails show no clubbing.   Psychiatric: He has a normal mood and affect. His speech is normal and behavior is normal. Thought content normal. He is not actively hallucinating. He is attentive.         Test Results:  See above    Assessment:       Generalized abdominal pain, episodic, currently resolved.  Hiatal hernia. Asymptomatic left inguinal hernia. Due for screening colonoscopy.  Differential diagnosis includes but is not limited to esophageal neoplasm, esophagitis, esophageal ulcer, gastroesophageal reflux disease, gastritis, gastric ulcer, gastric cancer, duodenitis, duodenal ulcer, inflammatory bowel disease, diverticulosis, hemorrhoids, gastrointestinal angiodysplasias, benign gastrointestinal neoplasm, colon cancer, etc.  Options at this time include but are not limited to endoscopy, second opinion, capsule endoscopy, radiologic studies, observation, etc.  Multiple comorbid issues ( anticoagulated, pacemaker, atrial fibrillation hyperlipidemia, hypertension, GERD, hepatic  steatosis ) increase the complexity of required perioperative evaluation & management, risks of complications, and likely will increase the difficulty and complexity of postoperative care, etc.  These issues must be factored in to preoperative evaluation and perioperative management.    1. Generalized abdominal pain    2. Hiatal hernia    3. Left inguinal hernia    4. Encounter for screening colonoscopy        Plan:   Generalized abdominal pain    Hiatal hernia    Left inguinal hernia    Encounter for screening colonoscopy  -     Case Request Operating Room: COLONOSCOPY, ESOPHAGOGASTRODUODENOSCOPY (EGD)    Other orders  -     sodium,potassium,mag sulfates (SUPREP BOWEL PREP KIT) 17.5-3.13-1.6 gram SolR; Follow written instructions provided by Clinic  Dispense: 2 Bottle; Refill: 0        Eliquis stopped on 6/15/19     Follow up around 7/4/2019 for routine post-endosocpy visit.    Counseling/Medical Decision Making:  Elmer Cunha was counseled regarding the results of the evaluation thus far and the differential diagnosis.  Diagnostic and therapeutic options were discussed in detail along with the risks, benefits, possible complications, details of, and indications for each option.  Diagnoses discussed included but were not limited to: GB disease, GERD, hiatal hernia, PUD, gastritis, duodenitis, Sphincter of Oddi dysfunction, hemorrhoids, diverticulosis, cancer, IBD, IBS, benign tumors, angiodysplasias, ischemic disease.  Options discussed included but were not limited to: EGD, colonoscopy, proctoscopy, anoscopy, sigmoidoscopy, radiologic studies, PillCam, empiric therapy, second opinion, etc. Possible complications of endoscopy discussed included but were not limited to: bleeding, infections, endocarditis, injury to internal organs, incomplete exam, failure to diagnose cancer or other serious condition, need for emergency surgery, need for a temporary colostomy, need for additional operations or procedures, etc.   Entire conversation was held in layman's terms.  All unfamiliar terms were defined.  Questions solicited and answered.  I read the consent form to him verbatim.  Donnie booklets were provided on EGD, GERD, GB disease / surgery, colonoscopy, polyps, diverticulosis, hemorrhoids, cancer, etc.  A copy of the consent form was provided for his review outside the office / hospital prior to the procedures.  At the conclusion of the conversation he voiced complete understanding of all we discussed and satisfaction that all of his questions had been answered to his full understanding.  He stated that he felt fully informed and completely capable of making an informed decision.  He requests and desires to proceed with EGD and colonoscopy.

## 2019-05-23 NOTE — LETTER
May 23, 2019      Lisa Y. Cooksey, SHIRA  [address]  [place]  [place] [address]           [facility] - [facility]  [address] [facility] [place] [address]  Phone: [phone]  Fax: [phone]          Patient: Elmer Cunha   MR Number: [id number]   YOB: 1955   Date of Visit: 5/23/2019       Dear Lisa Y. Cooksey:    Thank you for referring Elmer Cunha to me for evaluation. Attached you will find relevant portions of my assessment and plan of care.    If you have questions, please do not hesitate to call me. I look forward to following Elmer Cunha along with you.    Sincerely,    Tyree Powell MD    Enclosure  CC:  No Recipients    If you would like to receive this communication electronically, please contact [email or web address] or [phone] to request more information on [facility] access.    For providers and/or their staff who would like to refer a patient to [facility], please contact us through our one-stop-shop provider referral line, [facility], at [phone].    If you feel you have received this communication in error or would no longer like to receive these types of communications, please e-mail [email or web address]

## 2019-05-31 ENCOUNTER — TELEPHONE (OUTPATIENT)
Dept: SURGERY | Facility: CLINIC | Age: 64
End: 2019-05-31

## 2019-05-31 NOTE — TELEPHONE ENCOUNTER
----- Message from Janel Mittal sent at 5/31/2019 11:07 AM CDT -----  Contact: Patient  Type: Needs Medical Advice    Who Called:  Patient  Symptoms (please be specific):  na  How long has patient had these symptoms:  yu  Pharmacy name and phone #:  yu  Best Call Back Number: [phone] (home)    Additional Information: Patient requesting a call to discuss if he will need a cardiology clearance for his upcoming colonoscopy. Please call to advise, thank you!

## 2019-05-31 NOTE — TELEPHONE ENCOUNTER
Writer spoke to pt and pt stated that he saw his cardiologist Dr Gomez this week and wanted to know if Dr Powell needed a cardiac clearance for his procedure colonoscopy scheduled on 06/18/19. Writer expressed that a faxed request will be faxed for clearance. Request faxed, pt expressed verbal understanding.

## 2019-06-03 ENCOUNTER — TELEPHONE (OUTPATIENT)
Dept: SURGERY | Facility: CLINIC | Age: 64
End: 2019-06-03

## 2019-06-03 NOTE — TELEPHONE ENCOUNTER
----- Message from Stefany Marie sent at 6/3/2019  8:23 AM CDT -----  Contact: self [phone]  Dr Gomez's office told him that you need to fax the request for clearance to them.  He said he would appreciate it if you would let him know when you have sent it and received it.  Thank you!

## 2019-06-03 NOTE — TELEPHONE ENCOUNTER
Writer spoke to pt and let him know that a request was faxed for cardiac clearance. Pt expressed verbal understanding.

## 2019-06-07 ENCOUNTER — TELEPHONE (OUTPATIENT)
Dept: SURGERY | Facility: CLINIC | Age: 64
End: 2019-06-07

## 2019-06-07 NOTE — TELEPHONE ENCOUNTER
----- Message from Kristy Burden sent at 6/7/2019 10:41 AM CDT -----  Contact: Elmer booth  Type: Needs Medical Advice    Who Called:  Elmer Corado Call Back Number: [phone]  Additional Information: Pt said to have someone call me and hung up

## 2019-06-17 ENCOUNTER — TELEPHONE (OUTPATIENT)
Dept: PODIATRY | Facility: CLINIC | Age: 64
End: 2019-06-17

## 2019-06-19 ENCOUNTER — ANESTHESIA (OUTPATIENT)
Dept: SURGERY | Facility: HOSPITAL | Age: 64
End: 2019-06-19
Payer: MEDICARE

## 2019-06-19 ENCOUNTER — ANESTHESIA EVENT (OUTPATIENT)
Dept: SURGERY | Facility: HOSPITAL | Age: 64
End: 2019-06-19
Payer: MEDICARE

## 2019-06-19 ENCOUNTER — HOSPITAL ENCOUNTER (OUTPATIENT)
Facility: HOSPITAL | Age: 64
Discharge: HOME | End: 2019-06-19
Attending: SURGERY | Admitting: SURGERY
Payer: MEDICARE

## 2019-06-19 ENCOUNTER — TELEPHONE (OUTPATIENT)
Dept: SURGERY | Facility: CLINIC | Age: 64
End: 2019-06-19

## 2019-06-19 DIAGNOSIS — R10.84 GENERALIZED ABDOMINAL PAIN: ICD-10-CM

## 2019-06-19 DIAGNOSIS — K59.00 CONSTIPATION, UNSPECIFIED CONSTIPATION TYPE: ICD-10-CM

## 2019-06-19 DIAGNOSIS — Z12.11 ENCOUNTER FOR SCREENING COLONOSCOPY: Primary | ICD-10-CM

## 2019-06-19 PROCEDURE — G0121 COLON CA SCRN NOT HI RSK IND: ICD-10-PCS | Mod: ,,, | Performed by: SURGERY

## 2019-06-19 PROCEDURE — 25000003 PHARM REV CODE 250

## 2019-06-19 PROCEDURE — D9220A PRA ANESTHESIA: Mod: CRNA,,, | Performed by: NURSE ANESTHETIST, CERTIFIED REGISTERED

## 2019-06-19 PROCEDURE — 37000008 HC ANESTHESIA 1ST 15 MINUTES: Performed by: SURGERY

## 2019-06-19 PROCEDURE — S0028 INJECTION, FAMOTIDINE, 20 MG: HCPCS

## 2019-06-19 PROCEDURE — 45378 DIAGNOSTIC COLONOSCOPY: CPT | Performed by: SURGERY

## 2019-06-19 PROCEDURE — 37000009 HC ANESTHESIA EA ADD 15 MINS: Performed by: SURGERY

## 2019-06-19 PROCEDURE — 63600175 PHARM REV CODE 636 W HCPCS: Performed by: NURSE ANESTHETIST, CERTIFIED REGISTERED

## 2019-06-19 PROCEDURE — 27201012 HC FORCEPS, HOT/COLD, DISP: Performed by: SURGERY

## 2019-06-19 PROCEDURE — 25000003 PHARM REV CODE 250: Performed by: SURGERY

## 2019-06-19 PROCEDURE — 43239 PR EGD, FLEX, W/BIOPSY, SGL/MULTI: ICD-10-PCS | Mod: 51,,, | Performed by: SURGERY

## 2019-06-19 PROCEDURE — D9220A PRA ANESTHESIA: ICD-10-PCS | Mod: ANES,,, | Performed by: ANESTHESIOLOGY

## 2019-06-19 PROCEDURE — G0121 COLON CA SCRN NOT HI RSK IND: HCPCS | Mod: ,,, | Performed by: SURGERY

## 2019-06-19 PROCEDURE — D9220A PRA ANESTHESIA: Mod: ANES,,, | Performed by: ANESTHESIOLOGY

## 2019-06-19 PROCEDURE — 43239 EGD BIOPSY SINGLE/MULTIPLE: CPT | Mod: 51,,, | Performed by: SURGERY

## 2019-06-19 PROCEDURE — 88305 TISSUE EXAM BY PATHOLOGIST: CPT | Mod: 26,,, | Performed by: PATHOLOGY

## 2019-06-19 PROCEDURE — 88305 TISSUE SPECIMEN TO PATHOLOGY - SURGERY: ICD-10-PCS | Mod: 26,,, | Performed by: PATHOLOGY

## 2019-06-19 PROCEDURE — D9220A PRA ANESTHESIA: ICD-10-PCS | Mod: CRNA,,, | Performed by: NURSE ANESTHETIST, CERTIFIED REGISTERED

## 2019-06-19 PROCEDURE — 43239 EGD BIOPSY SINGLE/MULTIPLE: CPT | Performed by: SURGERY

## 2019-06-19 PROCEDURE — 88305 TISSUE EXAM BY PATHOLOGIST: CPT | Mod: 59 | Performed by: PATHOLOGY

## 2019-06-19 PROCEDURE — G0121 COLON CA SCRN NOT HI RSK IND: HCPCS | Performed by: SURGERY

## 2019-06-19 RX ORDER — FAMOTIDINE 10 MG/ML
20 INJECTION INTRAVENOUS ONCE
Status: COMPLETED | OUTPATIENT
Start: 2019-06-19 | End: 2019-06-19

## 2019-06-19 RX ORDER — FAMOTIDINE 10 MG/ML
INJECTION INTRAVENOUS
Status: COMPLETED
Start: 2019-06-19 | End: 2019-06-19

## 2019-06-19 RX ORDER — PROPOFOL 10 MG/ML
VIAL (ML) INTRAVENOUS
Status: DISCONTINUED | OUTPATIENT
Start: 2019-06-19 | End: 2019-06-19

## 2019-06-19 RX ORDER — LIDOCAINE HYDROCHLORIDE 10 MG/ML
1 INJECTION, SOLUTION EPIDURAL; INFILTRATION; INTRACAUDAL; PERINEURAL ONCE
Status: DISCONTINUED | OUTPATIENT
Start: 2019-06-19 | End: 2019-06-19 | Stop reason: HOSPADM

## 2019-06-19 RX ORDER — POLYETHYLENE GLYCOL 3350 17 G/17G
17 POWDER, FOR SOLUTION ORAL 2 TIMES DAILY
Qty: 1020 G | Refills: 11 | Status: SHIPPED | OUTPATIENT
Start: 2019-06-19 | End: 2019-07-19

## 2019-06-19 RX ORDER — SODIUM CHLORIDE, SODIUM LACTATE, POTASSIUM CHLORIDE, CALCIUM CHLORIDE 600; 310; 30; 20 MG/100ML; MG/100ML; MG/100ML; MG/100ML
INJECTION, SOLUTION INTRAVENOUS CONTINUOUS
Status: DISCONTINUED | OUTPATIENT
Start: 2019-06-19 | End: 2019-06-19 | Stop reason: HOSPADM

## 2019-06-19 RX ORDER — ONDANSETRON 2 MG/ML
4 INJECTION INTRAMUSCULAR; INTRAVENOUS DAILY PRN
Status: DISCONTINUED | OUTPATIENT
Start: 2019-06-19 | End: 2019-06-19 | Stop reason: HOSPADM

## 2019-06-19 RX ORDER — SODIUM CHLORIDE, SODIUM LACTATE, POTASSIUM CHLORIDE, CALCIUM CHLORIDE 600; 310; 30; 20 MG/100ML; MG/100ML; MG/100ML; MG/100ML
125 INJECTION, SOLUTION INTRAVENOUS CONTINUOUS
Status: DISCONTINUED | OUTPATIENT
Start: 2019-06-19 | End: 2019-06-19 | Stop reason: HOSPADM

## 2019-06-19 RX ORDER — DIPHENHYDRAMINE HYDROCHLORIDE 50 MG/ML
12.5 INJECTION INTRAMUSCULAR; INTRAVENOUS
Status: DISCONTINUED | OUTPATIENT
Start: 2019-06-19 | End: 2019-06-19 | Stop reason: HOSPADM

## 2019-06-19 RX ADMIN — PROPOFOL 40 MG: 10 INJECTION, EMULSION INTRAVENOUS at 07:06

## 2019-06-19 RX ADMIN — SODIUM CHLORIDE, POTASSIUM CHLORIDE, SODIUM LACTATE AND CALCIUM CHLORIDE: 600; 310; 30; 20 INJECTION, SOLUTION INTRAVENOUS at 07:06

## 2019-06-19 RX ADMIN — FAMOTIDINE 20 MG: 10 INJECTION INTRAVENOUS at 07:06

## 2019-06-19 NOTE — [employer] PATIENT INSTRUCTIONS
Discharge Summary/Instructions after an Endoscopic Procedure  Patient Name: Elmer Cunha  Patient MRN: [id number]  Patient YOB: 1955 Wednesday, June 19, 2019  Tyree Powell MD  RESTRICTIONS:  During your procedure today, you received medications for sedation.  These   medications may affect your judgment, balance and coordination.  Therefore,   for 24 hours, you have the following restrictions:   - DO NOT drive a car, operate machinery, make legal/financial decisions,   sign important papers or drink alcohol.    ACTIVITY:  Today: no heavy lifting, straining or running due to procedural   sedation/anesthesia.  The following day: return to full activity including work.  DIET:  Eat and drink normally unless instructed otherwise.     TREATMENT FOR COMMON SIDE EFFECTS:  - Mild abdominal pain, nausea, belching, bloating or excessive gas:  rest,   eat lightly and use a heating pad.  - Sore Throat: treat with throat lozenges and/or gargle with warm salt   water.  - Because air was used during the procedure, expelling large amounts of air   from your rectum or belching is normal.  - If a bowel prep was taken, you may not have a bowel movement for 1-3 days.    This is normal.  SYMPTOMS TO WATCH FOR AND REPORT TO YOUR PHYSICIAN:  1. Abdominal pain or bloating, other than gas cramps.  2. Chest pain.  3. Back pain.  4. Signs of infection such as: chills or fever occurring within 24 hours   after the procedure.  5. Rectal bleeding, which would show as bright red, maroon, or black stools.   (A tablespoon of blood from the rectum is not serious, especially if   hemorrhoids are present.)  6. Vomiting.  7. Weakness or dizziness.  GO DIRECTLY TO THE NEAREST EMERGENCY ROOM IF YOU HAVE ANY OF THE FOLLOWING:      Difficulty breathing              Chills and/or fever over 101 F   Persistent vomiting and/or vomiting blood   Severe abdominal pain   Severe chest pain   Black, tarry stools   Bleeding- more than one  tablespoon   Any other symptom or condition that you feel may need urgent attention  Your doctor recommends these additional instructions:  If any biopsies were taken, your doctors clinic will contact you in 1 to 2   weeks with any results.  - Discharge patient to home.   - Resume regular diet.   - Continue present medications.   - Continue Protonix  - Await pathology results.   - Return to my office in 2 weeks.   - Further recommendations will depend on clinical course  - Review Colonoscopy Report  - Treat Helicobacter if present  - Patient has a contact number available for emergencies.  The signs and   symptoms of potential delayed complications were discussed with the   patient.  Return to normal activities tomorrow.  Written discharge   instructions were provided to the patient.  For questions, problems or results please call your physician - Tyree Powell MD at Work:  [phone].  [facility] EMERGENCY ROOM PHONE NUMBER: [phone]  IF A COMPLICATION OR EMERGENCY SITUATION ARISES AND YOU ARE UNABLE TO REACH   YOUR PHYSICIAN - GO DIRECTLY TO THE EMERGENCY ROOM.  MD Tyree Tony MD  6/19/2019 8:45:49 AM  This report has been verified and signed electronically.  [employer]

## 2019-06-19 NOTE — DISCHARGE SUMMARY
"[facility]  Discharge Note  Short Stay    Admit Date: 6/19/2019    Discharge Date and Time: No discharge date for patient encounter.     Attending Physician: Tyree Powell MD     Discharge Provider: Tyree Powell    Diagnoses:  Active Hospital Problems    Diagnosis  POA    *Generalized abdominal pain [R10.84]  Yes    Encounter for screening colonoscopy [Z12.11]  Not Applicable    Constipation [K59.00]  Yes      Resolved Hospital Problems   No resolved problems to display.       Discharged Condition: good    Hospital Course: Patient was admitted for an outpatient procedure and tolerated the procedure well with no complications.    Final Diagnoses: Same as principal problem.    Disposition: Home or Self Care    Follow up/Patient Instructions:    Medications:  Reconciled Home Medications:      Medication List      CHANGE how you take these medications    polyethylene glycol 17 gram/dose powder  Commonly known as:  GLYCOLAX  Take 17 g by mouth 2 (two) times daily.  What changed:  when to take this        CONTINUE taking these medications    albuterol-ipratropium 2.5 mg-0.5 mg/3 mL nebulizer solution  Commonly known as:  DUO-NEB  Take 3 mLs by nebulization every 4 (four) hours. Rescue     amLODIPine 5 MG tablet  Commonly known as:  NORVASC     atorvastatin 40 MG tablet  Commonly known as:  LIPITOR  Take 40 mg by mouth once daily.     CORDARONE 200 MG Tab  Generic drug:  amiodarone  Take 200 mg by mouth once daily.     docusate sodium 100 MG capsule  Commonly known as:  COLACE  Take 1 capsule (100 mg total) by mouth 2 (two) times daily. With 12 ounces of water     ELIQUIS 5 mg Tab  Generic drug:  apixaban  Take 5 mg by mouth 2 (two) times daily.     escitalopram oxalate 20 MG tablet  Commonly known as:  LEXAPRO  TAKE 1 TABLET BY MOUTH EACH DAY FOR MOOD "THANK YOU"     furosemide 20 MG tablet  Commonly known as:  LASIX     metoprolol succinate 25 MG 24 hr tablet  Commonly known as:  TOPROL-XL  Take 25 mg " "by mouth once daily. Take 1/2 tablet by mouth twice daily.     pantoprazole 40 MG tablet  Commonly known as:  PROTONIX  Take 1 tablet (40 mg total) by mouth once daily.     tamsulosin 0.4 mg Cap  Commonly known as:  FLOMAX  TAKE 2 CAPSULES BY MOUTH EACH DAY FOR PROSTATE/URINARY "THANK YOU"     TRAVATAN Z 0.004 % ophthalmic solution  Generic drug:  travoprost        STOP taking these medications    sodium,potassium,mag sulfates 17.5-3.13-1.6 gram Solr  Commonly known as:  SUPREP BOWEL PREP KIT          Discharge Procedure Orders   Diet Adult Regular     Order Specific Question Answer Comments   Additional restrictions: High Fiber      No driving until:     Order Specific Question Answer Comments   Date: 6/20/2019          Discharge Procedure Orders (must include Diet, Follow-up, Activity):   Discharge Procedure Orders (must include Diet, Follow-up, Activity)   Diet Adult Regular     Order Specific Question Answer Comments   Additional restrictions: High Fiber      No driving until:     Order Specific Question Answer Comments   Date: 6/20/2019       "

## 2019-06-19 NOTE — [employer] PATIENT INSTRUCTIONS
Discharge Summary/Instructions after an Endoscopic Procedure  Patient Name: Elmer Cunha  Patient MRN: [id number]  Patient YOB: 1955 Wednesday, June 19, 2019  Tyree Powell MD  RESTRICTIONS:  During your procedure today, you received medications for sedation.  These   medications may affect your judgment, balance and coordination.  Therefore,   for 24 hours, you have the following restrictions:   - DO NOT drive a car, operate machinery, make legal/financial decisions,   sign important papers or drink alcohol.    ACTIVITY:  Today: no heavy lifting, straining or running due to procedural   sedation/anesthesia.  The following day: return to full activity including work.  DIET:  Eat and drink normally unless instructed otherwise.     TREATMENT FOR COMMON SIDE EFFECTS:  - Mild abdominal pain, nausea, belching, bloating or excessive gas:  rest,   eat lightly and use a heating pad.  - Sore Throat: treat with throat lozenges and/or gargle with warm salt   water.  - Because air was used during the procedure, expelling large amounts of air   from your rectum or belching is normal.  - If a bowel prep was taken, you may not have a bowel movement for 1-3 days.    This is normal.  SYMPTOMS TO WATCH FOR AND REPORT TO YOUR PHYSICIAN:  1. Abdominal pain or bloating, other than gas cramps.  2. Chest pain.  3. Back pain.  4. Signs of infection such as: chills or fever occurring within 24 hours   after the procedure.  5. Rectal bleeding, which would show as bright red, maroon, or black stools.   (A tablespoon of blood from the rectum is not serious, especially if   hemorrhoids are present.)  6. Vomiting.  7. Weakness or dizziness.  GO DIRECTLY TO THE NEAREST EMERGENCY ROOM IF YOU HAVE ANY OF THE FOLLOWING:      Difficulty breathing              Chills and/or fever over 101 F   Persistent vomiting and/or vomiting blood   Severe abdominal pain   Severe chest pain   Black, tarry stools   Bleeding- more than one  tablespoon   Any other symptom or condition that you feel may need urgent attention  Your doctor recommends these additional instructions:  If any biopsies were taken, your doctors clinic will contact you in 1 to 2   weeks with any results.  - Discharge patient to home.   - High fiber diet.   - Continue present medications.   - Repeat colonoscopy in 5 years for screening purposes.   - Return to my office in 2 weeks.   - Further recommendations will depend on clinical course  - See EGD Report  - Increase MiraLax to 17 g twice daily in 16 oz of water with each dose  For questions, problems or results please call your physician - Tyree Powell MD at Work:  [phone].  [facility] EMERGENCY ROOM PHONE NUMBER: [phone]  IF A COMPLICATION OR EMERGENCY SITUATION ARISES AND YOU ARE UNABLE TO REACH   YOUR PHYSICIAN - GO DIRECTLY TO THE EMERGENCY ROOM.  MD Tyree Tony MD  6/19/2019 8:51:37 AM  This report has been verified and signed electronically.  [employer]

## 2019-06-19 NOTE — INTERVAL H&P NOTE
The patient has been examined and the H&P has been reviewed:    I concur with the findings and no changes have occurred since H&P was written.     Surgery risks, benefits and alternative options discussed and understood by patient/family.    No evident contraindications to proceeding with planned endoscopy today.          Active Hospital Problems    Diagnosis  POA    Generalized abdominal pain [R10.84]  Yes      Resolved Hospital Problems   No resolved problems to display.

## 2019-06-19 NOTE — TRANSFER OF CARE
"Anesthesia Transfer of Care Note    Patient: Elmer Cunha    Procedure(s) Performed: Procedure(s) (LRB):  COLONOSCOPY (N/A)  ESOPHAGOGASTRODUODENOSCOPY (EGD) (N/A)    Patient location: PACU    Anesthesia Type: general    Transport from OR: Transported from OR on room air with adequate spontaneous ventilation    Post pain: adequate analgesia    Post assessment: no apparent anesthetic complications and tolerated procedure well    Post vital signs: stable    Level of consciousness: awake, alert and oriented    Nausea/Vomiting: no nausea/vomiting    Complications: none    Transfer of care protocol was followed      Last vitals:   Visit Vitals  /81 (BP Location: Right arm, Patient Position: Lying)   Pulse 73   Temp 36.4 °C (97.6 °F) (Oral)   Resp (!) 26   Ht 5' 11" (1.803 m)   Wt 115.2 kg (254 lb)   SpO2 96%   BMI 35.43 kg/m²     "

## 2019-06-19 NOTE — ANESTHESIA PREPROCEDURE EVALUATION
06/19/2019  Elmer Cunha is a 63 y.o., male.    Anesthesia Evaluation    I have reviewed the Patient Summary Reports.    I have reviewed the Nursing Notes.   I have reviewed the Medications.     Review of Systems  Anesthesia Hx:  No problems with previous Anesthesia    Social:  Former Smoker    Hematology/Oncology:     Oncology Normal    -- Coag Disorders: Bleeding Disorder: currently taking: apixaban   EENT/Dental:EENT/Dental Normal   Cardiovascular:   Hypertension Dysrhythmias (PM)    Pulmonary:  Pulmonary Normal    Hepatic/GI:   GERD    Musculoskeletal:   Arthritis     Neurological:   CVA        Physical Exam  General:  Well nourished, Obesity    Airway/Jaw/Neck:  Airway Findings: Mouth Opening: Normal Tongue: Normal  General Airway Assessment: Adult  Mallampati: III  TM Distance: Normal, at least 6 cm      Dental:  Dental Findings: Edentulous   Chest/Lungs:  Chest/Lungs Findings: Clear to auscultation     Heart/Vascular:  Heart Findings: Rate: Normal  Rhythm: Regular Rhythm        Mental Status:  Mental Status Findings:  Cooperative, Alert and Oriented         Anesthesia Plan  Type of Anesthesia, risks & benefits discussed:  Anesthesia Type:  general  Patient's Preference:   Intra-op Monitoring Plan: standard ASA monitors  Intra-op Monitoring Plan Comments:   Post Op Pain Control Plan: IV/PO Opioids PRN  Post Op Pain Control Plan Comments:   Induction:   IV  Beta Blocker:  Patient is not currently on a Beta-Blocker (No further documentation required).       Informed Consent: Patient understands risks and agrees with Anesthesia plan.  Questions answered. Anesthesia consent signed with patient.  ASA Score: 3     Day of Surgery Review of History & Physical: I have interviewed and examined the patient. I have reviewed the patient's H&P dated:            Ready For Surgery From Anesthesia Perspective.

## 2019-06-19 NOTE — ANESTHESIA POSTPROCEDURE EVALUATION
Anesthesia Post Evaluation    Patient: Elmer Cunha    Procedure(s) Performed: Procedure(s) (LRB):  COLONOSCOPY (N/A)  ESOPHAGOGASTRODUODENOSCOPY (EGD) (N/A)    Final Anesthesia Type: general  Patient location during evaluation: PACU  Patient participation: Yes- Able to Participate  Level of consciousness: awake and alert  Post-procedure vital signs: reviewed and stable  Pain management: adequate  Airway patency: patent  PONV status at discharge: No PONV  Anesthetic complications: no      Cardiovascular status: blood pressure returned to baseline  Respiratory status: unassisted  Hydration status: euvolemic  Follow-up not needed.          Vitals Value Taken Time   /82 6/19/2019  8:32 AM   Temp 36.4 °C (97.6 °F) 6/19/2019  6:59 AM   Pulse 69 6/19/2019  8:37 AM   Resp 16 6/19/2019  8:37 AM   SpO2 96 % 6/19/2019  8:37 AM   Vitals shown include unvalidated device data.      Event Time     Out of Recovery 08:35:00          Pain/Bernardo Score: Bernardo Score: 10 (6/19/2019  8:30 AM)

## 2019-06-19 NOTE — DISCHARGE INSTRUCTIONS
Constipation (Adult)  Constipation means that you have bowel movements that are less frequent than usual. Stools often become very hard and difficult to pass.  Constipation is very common. At some point in life it affects almost everyone. Since everyone's bowel habits are different, what is constipation to one person may not be to another. Your healthcare provider may do tests to diagnose constipation. It depends on what he or she finds when evaluating you.    Symptoms of constipation include:  · Abdominal pain  · Bloating  · Vomiting  · Painful bowel movements  · Itching, swelling, bleeding, or pain around the anus  Causes  Constipation can have many causes. These include:  · Diet low in fiber  · Too much dairy  · Not drinking enough liquids  · Lack of exercise or physical activity. This is especially true for older adults.  · Changes in lifestyle or daily routine, including pregnancy, aging, work, and travel  · Frequent use or misuse of laxatives  · Ignoring the urge to have a bowel movement or delaying it until later  · Medicines, such as certain prescription pain medicines, iron supplements, antacids, certain antidepressants, and calcium supplements  · Diseases like irritable bowel syndrome, bowel obstructions, stroke, diabetes, thyroid disease, Parkinson disease, hemorrhoids, and colon cancer  Complications  Potential complications of constipation can include:  · Hemorrhoids  · Rectal bleeding from hemorrhoids or anal fissures (skin tears)  · Hernias  · Dependency on laxatives  · Chronic constipation  · Fecal impaction  · Bowel obstruction or perforation  Home care  All treatment should be done after talking with your healthcare provider. This is especially true if you have another medical problems, are taking prescription medicines, or are an older adult. Treatment most often involves lifestyle changes. You may also need medicines. Your healthcare provider will tell you which will work best for you. Follow  the advice below to help avoid this problem in the future.  Lifestyle changes  These lifestyle changes can help prevent constipation:  · Diet. Eat a high-fiber diet, with fresh fruit and vegetables, and reduce dairy intake, meats, and processed foods  · Fluids. It's important to get enough fluids each day. Drink plenty of water when you eat more fiber. If you are on diet that limits the amount of fluid you can have, talk about this with your healthcare provider.  · Regular exercise. Check with your healthcare provider first.  Medications  Take any medicines as directed. Some laxatives are safe to use only every now and then. Others can be taken on a regular basis. Talk with your doctor or pharmacist if you have questions.  Prescription pain medicines can cause constipation. If you are taking this kind of medicine, ask your healthcare provider if you should also take a stool softener.  Medicines you may take to treat constipation include:  · Fiber supplements  · Stool softeners  · Laxatives  · Enemas  · Rectal suppositories  Follow-up care  Follow up with your healthcare provider if symptoms don't get better in the next few days. You may need to have more tests or see a specialist.  Call 911  Call 911 if any of these occur:  · Trouble breathing  · Stiff, rigid abdomen that is severely painful to touch  · Confusion  · Fainting or loss of consciousness  · Rapid heart rate  · Chest pain  When to seek medical advice  Call your healthcare provider right away if any of these occur:  · Fever over 100.4°F (38°C)  · Failure to resume normal bowel movements  · Pain in your abdomen or back gets worse  · Nausea or vomiting  · Swelling in your abdomen  · Blood in the stool  · Black, tarry stool  · Involuntary weight loss  · Weakness  Date Last Reviewed: 12/30/2015  © 2667-3895 [employer]. [address] [place] [address], [place] [address]. All rights reserved. This information is not intended as a substitute for  professional medical care. Always follow your healthcare professional's instructions.        4 Steps for Eating Healthier  Changing the way you eat can improve your health. It can lower your cholesterol and blood pressure, and help you stay at a healthy weight. Your diet doesnt have to be bland and boring to be healthy. Just watch your calories and follow these steps:    1. Eat fewer unhealthy fats  · Choose more fish and lean meats instead of fatty cuts of meat.  · Skip butter and lard, and use less margarine.  · Pass on foods that have palm, coconut, or hydrogenated oils.  · Eat fewer high-fat dairy foods like cheese, ice cream, and whole milk.  · Get a heart-healthy cookbook and try some low-fat recipes.  2. Go light on salt  · Keep the saltshaker off the table.  · Limit high-salt ingredients, such as soy sauce, bouillon, and garlic salt.  · Instead of adding salt when cooking, season your food with herbs and flavorings. Try lemon, garlic, and onion.  · Limit convenience foods, such as boxed or canned foods and restaurant food.  · Read food labels and choose lower-sodium options.  3. Limit sugar  · Pause before you add sugars to pancakes, cereal, coffee, or tea. This includes white and brown table sugar, syrup, honey, and molasses. Cut your usual amount by half.  · Use non-sugar sweeteners. Stevia, aspartame, and sucralose can satisfy a sweet tooth without adding calories.  · Swap out sugar-filled soda and other drinks. Buy sugar-free or low-calorie beverages. Remember water is always the best choice.  · Read labels and choose foods with less added sugar. Keep in mind that dairy foods and foods with fruit will have some natural sugar.  · Cut the sugar in recipes by 1/3 to 1/2. Boost the flavor with extracts like almond, vanilla, or orange. Or add spices such as cinnamon or nutmeg.  4. Eat more fiber  · Eat fresh fruits and vegetables every day.  · Boost your diet with whole grains. Go for oats, whole-grain rice,  and bran.  · Add beans and lentils to your meals.  · Drink more water to match your fiber increase. This is to help prevent constipation.  Date Last Reviewed: 5/11/2015  © 3168-3235 [employer]. [address] [place] [address], [place] [address]. All rights reserved. This information is not intended as a substitute for professional medical care. Always follow your healthcare professional's instructions.

## 2019-06-19 NOTE — TELEPHONE ENCOUNTER
----- Message from Michelle Raya sent at 6/19/2019  1:05 PM CDT -----  Type: Needs Medical Advice    Who Called:  Patient  Best Call Back Number: [phone]  Additional Information: Patient had a colonoscopy done today/has question concerning/please call patient back to advise.

## 2019-06-19 NOTE — TELEPHONE ENCOUNTER
Writer spoke to pt and let him know that his procedure today will be discussed at his f/u appt on 07/08/19 @ 1:30 pm. Pt expressed verbal understanding and had no further questions.

## 2019-06-24 VITALS
TEMPERATURE: 98 F | OXYGEN SATURATION: 97 % | WEIGHT: 254 LBS | DIASTOLIC BLOOD PRESSURE: 82 MMHG | SYSTOLIC BLOOD PRESSURE: 132 MMHG | HEART RATE: 70 BPM | HEIGHT: 71 IN | BODY MASS INDEX: 35.56 KG/M2 | RESPIRATION RATE: 17 BRPM

## 2019-06-25 ENCOUNTER — OFFICE VISIT (OUTPATIENT)
Dept: PODIATRY | Facility: CLINIC | Age: 64
End: 2019-06-25
Payer: MEDICARE

## 2019-06-25 VITALS
HEART RATE: 80 BPM | DIASTOLIC BLOOD PRESSURE: 91 MMHG | TEMPERATURE: 98 F | HEIGHT: 71 IN | WEIGHT: 254 LBS | OXYGEN SATURATION: 97 % | SYSTOLIC BLOOD PRESSURE: 115 MMHG | BODY MASS INDEX: 35.56 KG/M2

## 2019-06-25 DIAGNOSIS — M20.42 HAMMER TOE OF LEFT FOOT: Primary | ICD-10-CM

## 2019-06-25 DIAGNOSIS — M15.9 PRIMARY OSTEOARTHRITIS INVOLVING MULTIPLE JOINTS: ICD-10-CM

## 2019-06-25 DIAGNOSIS — L60.0 INGROWN NAIL: ICD-10-CM

## 2019-06-25 PROCEDURE — 99213 PR OFFICE/OUTPT VISIT, EST, LEVL III, 20-29 MIN: ICD-10-PCS | Mod: S$PBB,,, | Performed by: PODIATRIST

## 2019-06-25 PROCEDURE — 99999 PR PBB SHADOW E&M-EST. PATIENT-LVL III: CPT | Mod: PBBFAC,,, | Performed by: PODIATRIST

## 2019-06-25 PROCEDURE — 99213 OFFICE O/P EST LOW 20 MIN: CPT | Mod: S$PBB,,, | Performed by: PODIATRIST

## 2019-06-25 PROCEDURE — 99999 PR PBB SHADOW E&M-EST. PATIENT-LVL III: ICD-10-PCS | Mod: PBBFAC,,, | Performed by: PODIATRIST

## 2019-06-25 PROCEDURE — 99213 OFFICE O/P EST LOW 20 MIN: CPT | Mod: PBBFAC,PN | Performed by: PODIATRIST

## 2019-06-25 NOTE — LETTER
June 29, 2019      Sonny Cerna III, MD  [address]  [place] [address]           [facility]  [place] - Podiatry/Wound Care  [address] [place] [address]  Phone: [phone]  Fax: [phone]          Patient: Elmer Cunha   MR Number: [id number]   YOB: 1955   Date of Visit: 6/25/2019       Dear Dr. Sonny Cerna III:    Thank you for referring Elmer Cunha to me for evaluation. Attached you will find relevant portions of my assessment and plan of care.    If you have questions, please do not hesitate to call me. I look forward to following Elmer Cunha along with you.    Sincerely,    Nilay Cox, DPANNIKA    Enclosure  CC:  No Recipients    If you would like to receive this communication electronically, please contact [email or web address] or [phone] to request more information on [employer] access.    For providers and/or their staff who would like to refer a patient to [facility], please contact us through our one-stop-shop provider referral line, [facility], [place], at [phone].    If you feel you have received this communication in error or would no longer like to receive these types of communications, please e-mail [email or web address]

## 2019-06-29 PROBLEM — M20.42 HAMMER TOE OF LEFT FOOT: Status: ACTIVE | Noted: 2019-06-29

## 2019-06-30 NOTE — PROGRESS NOTES
Subjective:       Patient ID: Elmer Cunha is a 63 y.o. male.    Chief Complaint:       Patient is also complaining about a painful ingrown toenail on his left great toe.  Nail Problem   Associated symptoms include arthralgias and joint swelling.   Foot Pain   Associated symptoms include arthralgias and joint swelling.     Review of Systems   Musculoskeletal: Positive for arthralgias, gait problem and joint swelling.   All other systems reviewed and are negative.      Objective:      Physical Exam   Constitutional: He appears well-developed and well-nourished.   Cardiovascular: Normal rate, regular rhythm and normal heart sounds.   Pulses:       Dorsalis pedis pulses are 1+ on the right side, and 1+ on the left side.        Posterior tibial pulses are 1+ on the right side, and 1+ on the left side.   Pulmonary/Chest: Effort normal and breath sounds normal.   Musculoskeletal: He exhibits edema and tenderness.        Right foot: There is decreased range of motion.   Feet:   Right Foot:   Protective Sensation: 4 sites tested. 4 sites sensed.   Skin Integrity: Positive for erythema and warmth.   Left Foot:   Protective Sensation: 4 sites tested. 4 sites sensed.   Neurological: He is alert.   Skin: Skin is warm. Capillary refill takes 2 to 3 seconds.   Psychiatric: He has a normal mood and affect. His behavior is normal. Judgment and thought content normal.   Nursing note and vitals reviewed.        Patient is noted to have an ingrowing toenail secondary to fungal involvement with early signs of infection on the patient's left hallux tenderness and discomfort is noted      Assessment:       1. Hammer toe of left foot    2. Primary osteoarthritis involving multiple joints    3. Ingrown nail        Plan:                 On evaluation patient has findings consistent with infected ingrown toenail of the left hallux there is some minor nail that is growing into the medial border with early signs of infection noted this  required debridement however nail avulsion is not necessary at this time patient needs to monitor this area closely.  Patient had diffuse callus formation which puts him at risk for ulceration no current signs of infection noted at this time in these areas. Total face-to-face time including discussion evaluation removal ingrown toenail from the left great toe and non excisional debridement of the hyperkeratotic lesions the put the patient at risk for ulceration equaled 15 min.  Patient's evaluation is not related to previous surgery on the right foot but the result of an ingrowing toenail on the left hallux unrelated evaluation management performed.  Patient relates that he read he recently had brain surgery to remove fluid on his brain successfully since I last saw him and has recovered very well. Patient has a digital contracture 5th digit left this was debrided today non excisionally SAMMI opposed pad applied to the area patient stated the area felt much better he is scheduled to have a knee replacement performed in September.  Knee replacement will be on the left side.    This note was created using [employer] voice recognition software that occasionally misinterpreted phrases or words.

## 2019-08-08 ENCOUNTER — OFFICE VISIT (OUTPATIENT)
Dept: SURGERY | Facility: CLINIC | Age: 64
End: 2019-08-08
Payer: MEDICARE

## 2019-08-08 VITALS
BODY MASS INDEX: 35 KG/M2 | OXYGEN SATURATION: 97 % | SYSTOLIC BLOOD PRESSURE: 131 MMHG | HEIGHT: 71 IN | WEIGHT: 250 LBS | HEART RATE: 101 BPM | DIASTOLIC BLOOD PRESSURE: 79 MMHG | RESPIRATION RATE: 18 BRPM

## 2019-08-08 DIAGNOSIS — K21.00 GASTROESOPHAGEAL REFLUX DISEASE WITH ESOPHAGITIS: ICD-10-CM

## 2019-08-08 DIAGNOSIS — K59.01 SLOW TRANSIT CONSTIPATION: Primary | ICD-10-CM

## 2019-08-08 PROBLEM — Z12.11 ENCOUNTER FOR SCREENING COLONOSCOPY: Status: RESOLVED | Noted: 2019-06-19 | Resolved: 2019-08-08

## 2019-08-08 PROBLEM — R13.19 ESOPHAGEAL DYSPHAGIA: Status: RESOLVED | Noted: 2018-04-30 | Resolved: 2019-08-08

## 2019-08-08 PROBLEM — R10.84 GENERALIZED ABDOMINAL PAIN: Status: RESOLVED | Noted: 2019-06-19 | Resolved: 2019-08-08

## 2019-08-08 PROCEDURE — 99213 OFFICE O/P EST LOW 20 MIN: CPT | Mod: S$GLB,,, | Performed by: SURGERY

## 2019-08-08 PROCEDURE — 99213 PR OFFICE/OUTPT VISIT, EST, LEVL III, 20-29 MIN: ICD-10-PCS | Mod: S$GLB,,, | Performed by: SURGERY

## 2019-08-08 NOTE — PROGRESS NOTES
"Subjective:       Patient ID: Elmer Cunha is a 63 y.o. male.    Chief Complaint: Follow-up (Colonoscopy Results (6-9-19) )      HPI:  Mr. Cunha presents today following a recent outpatient EGD and colonoscopy.  He presents today with no complaints.  No nausea, vomiting, fevers, chills, abdominal pain, diarrhea, constipation, melena, hematochezia, hematemesis, etc.  Appetite is excellent.  Weight is stable.  Activity tolerance is normal.  Overall he feels "great".  EGD revealed evidence of chronic Helicobacter negative gastritis and reflux disease with mild esophagitis.  Colonoscopy revealed a redundant sigmoid and transverse colon but no other abnormalities.  All preop symptoms have resolved.    EGD, colonoscopy, and pathology reports were reviewed from 6/19/2019 with the above findings confirmed.      Allergies & Meds:  Review of patient's allergies indicates:   Allergen Reactions    Pcn [penicillins]      Had ? Reaction as child       Current Outpatient Medications   Medication Sig Dispense Refill    amiodarone (CORDARONE) 200 MG Tab Take 200 mg by mouth once daily.      amLODIPine (NORVASC) 5 MG tablet   11    apixaban (ELIQUIS) 5 mg Tab Take 5 mg by mouth 2 (two) times daily.      atorvastatin (LIPITOR) 40 MG tablet Take 40 mg by mouth once daily.      docusate sodium (COLACE) 100 MG capsule Take 1 capsule (100 mg total) by mouth 2 (two) times daily. With 12 ounces of water 60 capsule 5    escitalopram oxalate (LEXAPRO) 20 MG tablet TAKE 1 TABLET BY MOUTH EACH DAY FOR MOOD "THANK YOU" 30 tablet 5    furosemide (LASIX) 20 MG tablet   0    linaCLOtide (LINZESS) 145 mcg Cap capsule Take 2 capsules (290 mcg total) by mouth once daily. 60 capsule 5    metoprolol succinate (TOPROL-XL) 25 MG 24 hr tablet Take 25 mg by mouth once daily. Take 1/2 tablet by mouth twice daily.      pantoprazole (PROTONIX) 40 MG tablet Take 1 tablet (40 mg total) by mouth once daily. 30 tablet 11    tamsulosin (FLOMAX) 0.4 " "mg Cap TAKE 2 CAPSULES BY MOUTH EACH DAY FOR PROSTATE/URINARY "THANK YOU" 60 capsule 11    TRAVATAN Z 0.004 % ophthalmic solution   3     No current facility-administered medications for this visit.        PMFSHx:  Past medical history, surgical history, family history, social history reviewed and no changes noted.        Review of Systems   Constitutional: Negative for appetite change, chills, fatigue, fever and unexpected weight change.   HENT: Negative for congestion, dental problem, ear pain, mouth sores, postnasal drip, rhinorrhea, sore throat, tinnitus, trouble swallowing and voice change.    Eyes: Negative for photophobia, pain, discharge and visual disturbance.   Respiratory: Negative for cough, chest tightness, shortness of breath and wheezing.    Cardiovascular: Negative for chest pain, palpitations and leg swelling.   Gastrointestinal: Negative for abdominal pain, blood in stool, constipation, diarrhea, nausea and vomiting.   Endocrine: Negative for cold intolerance, heat intolerance, polydipsia, polyphagia and polyuria.   Genitourinary: Negative for difficulty urinating, dysuria, flank pain, frequency, hematuria and urgency.   Musculoskeletal: Negative for arthralgias, joint swelling and myalgias.   Skin: Negative for color change and rash.   Allergic/Immunologic: Negative for immunocompromised state.   Neurological: Negative for dizziness, tremors, seizures, syncope, speech difficulty, weakness, numbness and headaches.   Hematological: Negative for adenopathy. Does not bruise/bleed easily.   Psychiatric/Behavioral: Negative for agitation, confusion, hallucinations, self-injury and suicidal ideas. The patient is not nervous/anxious.        Objective:      Physical Exam   Constitutional: He appears well-developed and well-nourished.  Non-toxic appearance. He does not appear ill. No distress.   Cardiovascular: Normal rate, regular rhythm and normal heart sounds. PMI is not displaced. Exam reveals no " gallop.   No murmur heard.  Pulmonary/Chest: Effort normal and breath sounds normal. No accessory muscle usage. No tachypnea. No respiratory distress.   Abdominal: Soft. Normal appearance and bowel sounds are normal. There is no tenderness. No hernia.   Skin: Skin is warm, dry and intact. No rash noted.         Test Results:  See above    Assessment:       Gastroesophageal reflux disease with esophagitis, responding to medical therapy.  Chronic constipation related to redundant colon, responding to medical therapy.  Overall improved since endoscopy.    1. Slow transit constipation    2. Gastroesophageal reflux disease with esophagitis        Plan:   Slow transit constipation    Gastroesophageal reflux disease with esophagitis        Follow up for any concerns or questions as needed, resume care with PCP.    Counseling/Medical Decision Making:  Repeat screening colonoscopy recommended in 5 years.

## 2019-08-14 ENCOUNTER — TELEPHONE (OUTPATIENT)
Dept: PODIATRY | Facility: CLINIC | Age: 64
End: 2019-08-14

## 2019-08-14 NOTE — TELEPHONE ENCOUNTER
----- Message from Bethany Osman sent at 8/14/2019 10:42 AM CDT -----  Contact: Self  Pt is calling to speak with Staff regarding an earlier appt.  Pt is scheduled to be seen in September, but says he has a corn that is painful and needs attention now.  He is requesting Staff contact him to bring him in sooner.    He can be reached at [phone].    Thank you.

## 2019-08-27 ENCOUNTER — OFFICE VISIT (OUTPATIENT)
Dept: PODIATRY | Facility: CLINIC | Age: 64
End: 2019-08-27
Payer: MEDICARE

## 2019-08-27 VITALS
HEART RATE: 81 BPM | BODY MASS INDEX: 35 KG/M2 | HEIGHT: 71 IN | DIASTOLIC BLOOD PRESSURE: 77 MMHG | TEMPERATURE: 98 F | WEIGHT: 250 LBS | SYSTOLIC BLOOD PRESSURE: 146 MMHG

## 2019-08-27 DIAGNOSIS — M19.071 OSTEOARTHRITIS OF RIGHT ANKLE AND FOOT: ICD-10-CM

## 2019-08-27 DIAGNOSIS — M20.42 HAMMER TOE OF LEFT FOOT: ICD-10-CM

## 2019-08-27 DIAGNOSIS — L60.0 INGROWN NAIL: Primary | ICD-10-CM

## 2019-08-27 PROCEDURE — 99213 PR OFFICE/OUTPT VISIT, EST, LEVL III, 20-29 MIN: ICD-10-PCS | Mod: S$PBB,,, | Performed by: PODIATRIST

## 2019-08-27 PROCEDURE — 99213 OFFICE O/P EST LOW 20 MIN: CPT | Mod: PBBFAC,PN | Performed by: PODIATRIST

## 2019-08-27 PROCEDURE — 99999 PR PBB SHADOW E&M-EST. PATIENT-LVL III: ICD-10-PCS | Mod: PBBFAC,,, | Performed by: PODIATRIST

## 2019-08-27 PROCEDURE — 99213 OFFICE O/P EST LOW 20 MIN: CPT | Mod: S$PBB,,, | Performed by: PODIATRIST

## 2019-08-27 PROCEDURE — 99999 PR PBB SHADOW E&M-EST. PATIENT-LVL III: CPT | Mod: PBBFAC,,, | Performed by: PODIATRIST

## 2019-08-27 NOTE — PROGRESS NOTES
Subjective:       Patient ID: Elmer Cunha is a 64 y.o. male.    Chief Complaint:       Patient is also complaining about a painful ingrown toenail on his left great toe.  Nail Problem   Associated symptoms include arthralgias and joint swelling.   Foot Pain   Associated symptoms include arthralgias and joint swelling.     Review of Systems   Musculoskeletal: Positive for arthralgias, gait problem and joint swelling.   All other systems reviewed and are negative.      Objective:      Physical Exam   Constitutional: He appears well-developed and well-nourished.   Cardiovascular: Normal rate, regular rhythm and normal heart sounds.   Pulses:       Dorsalis pedis pulses are 1+ on the right side, and 1+ on the left side.        Posterior tibial pulses are 1+ on the right side, and 1+ on the left side.   Pulmonary/Chest: Effort normal and breath sounds normal.   Musculoskeletal: He exhibits edema and tenderness.        Right foot: There is decreased range of motion.   Feet:   Right Foot:   Protective Sensation: 4 sites tested. 4 sites sensed.   Skin Integrity: Positive for erythema and warmth.   Left Foot:   Protective Sensation: 4 sites tested. 4 sites sensed.   Neurological: He is alert.   Skin: Skin is warm. Capillary refill takes 2 to 3 seconds.   Psychiatric: He has a normal mood and affect. His behavior is normal. Judgment and thought content normal.   Nursing note and vitals reviewed.        Patient is noted to have an ingrowing toenail secondary to fungal involvement with early signs of infection on the patient's left hallux tenderness and discomfort is noted      Assessment:       1. Ingrown nail    2. Osteoarthritis of right ankle and foot    3. Hammer toe of left foot        Plan:                 On evaluation patient has findings consistent with infected ingrown toenail of the left hallux there is some minor nail that is growing into the medial border with early signs of infection noted this required  debridement however nail avulsion is not necessary at this time patient needs to monitor this area closely.  Patient had diffuse callus formation which puts him at risk for ulceration no current signs of infection noted at this time in these areas. Total face-to-face time including discussion evaluation removal ingrown toenail from the left great toe and non excisional debridement of the hyperkeratotic lesions the put the patient at risk for ulceration equaled 15 min.  Patient's evaluation is not related to previous surgery on the right foot but the result of an ingrowing toenail on the left hallux unrelated evaluation management performed.  Patient relates that he read he recently had brain surgery to remove fluid on his brain successfully since I last saw him and has recovered very well. Patient has a digital contracture 5th digit left this was debrided today non excisionally SAMMI opposed pad applied to the area patient stated the area felt much better he is scheduled to have a knee replacement performed in October.  Knee replacement will be on the left side.    This note was created using [employer] voice recognition software that occasionally misinterpreted phrases or words.

## 2019-08-27 NOTE — LETTER
August 27, 2019      Sonny Cerna III, MD  [address]  [place] [address]           [facility] - Podiatry/Wound Care  [facility] [facility] [place] [address]  Phone: [phone]  Fax: [phone]          Patient: Elmer Cunha   MR Number: [id number]   YOB: 1955   Date of Visit: 8/27/2019       Dear Dr. Sonny Cerna III:    Thank you for referring Elmer Cunha to me for evaluation. Attached you will find relevant portions of my assessment and plan of care.    If you have questions, please do not hesitate to call me. I look forward to following Elmer Cunha along with you.    Sincerely,    Nilay Cox, DPANNIKA    Enclosure  CC:  No Recipients    If you would like to receive this communication electronically, please contact [email or web address] or [phone] to request more information on [employer] Link access.    For providers and/or their staff who would like to refer a patient to [facility], please contact us through our one-stop-shop provider referral line, [facility] [place], at [phone].    If you feel you have received this communication in error or would no longer like to receive these types of communications, please e-mail [email or web address]

## 2019-09-04 ENCOUNTER — HOSPITAL ENCOUNTER (OUTPATIENT)
Dept: RADIOLOGY | Facility: HOSPITAL | Age: 64
Discharge: HOME | End: 2019-09-04
Attending: NURSE PRACTITIONER
Payer: MEDICARE

## 2019-09-04 DIAGNOSIS — R53.83 FATIGUE, UNSPECIFIED TYPE: ICD-10-CM

## 2019-09-04 DIAGNOSIS — M54.50 CHRONIC MIDLINE LOW BACK PAIN WITHOUT SCIATICA: ICD-10-CM

## 2019-09-04 DIAGNOSIS — M25.619 DECREASED RANGE OF MOTION OF SHOULDER, UNSPECIFIED LATERALITY: ICD-10-CM

## 2019-09-04 DIAGNOSIS — G89.29 CHRONIC PAIN OF BOTH KNEES: ICD-10-CM

## 2019-09-04 DIAGNOSIS — M79.642 BILATERAL HAND PAIN: ICD-10-CM

## 2019-09-04 DIAGNOSIS — M25.562 CHRONIC PAIN OF BOTH KNEES: ICD-10-CM

## 2019-09-04 DIAGNOSIS — M25.60 JOINT STIFFNESS: ICD-10-CM

## 2019-09-04 DIAGNOSIS — M25.511 CHRONIC PAIN OF BOTH SHOULDERS: ICD-10-CM

## 2019-09-04 DIAGNOSIS — M25.512 CHRONIC PAIN OF BOTH SHOULDERS: ICD-10-CM

## 2019-09-04 DIAGNOSIS — M25.561 CHRONIC PAIN OF BOTH KNEES: ICD-10-CM

## 2019-09-04 DIAGNOSIS — G89.29 CHRONIC MIDLINE LOW BACK PAIN WITHOUT SCIATICA: ICD-10-CM

## 2019-09-04 DIAGNOSIS — R68.89 COLD INTOLERANCE: ICD-10-CM

## 2019-09-04 DIAGNOSIS — M79.641 BILATERAL HAND PAIN: ICD-10-CM

## 2019-09-04 DIAGNOSIS — G89.29 CHRONIC PAIN OF BOTH SHOULDERS: ICD-10-CM

## 2019-09-10 PROBLEM — M51.36 DDD (DEGENERATIVE DISC DISEASE), LUMBAR: Status: ACTIVE | Noted: 2019-09-10

## 2019-09-10 PROBLEM — M51.369 DDD (DEGENERATIVE DISC DISEASE), LUMBAR: Status: ACTIVE | Noted: 2019-09-10

## 2019-09-10 PROBLEM — M19.041 OSTEOARTHRITIS OF BOTH HANDS: Status: ACTIVE | Noted: 2019-09-10

## 2019-09-10 PROBLEM — M19.042 OSTEOARTHRITIS OF BOTH HANDS: Status: ACTIVE | Noted: 2019-09-10

## 2019-09-26 ENCOUNTER — OFFICE VISIT (OUTPATIENT)
Dept: PODIATRY | Facility: CLINIC | Age: 64
End: 2019-09-26
Payer: MEDICARE

## 2019-09-26 VITALS
SYSTOLIC BLOOD PRESSURE: 147 MMHG | HEART RATE: 87 BPM | TEMPERATURE: 98 F | DIASTOLIC BLOOD PRESSURE: 78 MMHG | WEIGHT: 253 LBS | BODY MASS INDEX: 35.42 KG/M2 | HEIGHT: 71 IN

## 2019-09-26 DIAGNOSIS — L60.0 INGROWN NAIL: Primary | ICD-10-CM

## 2019-09-26 DIAGNOSIS — M19.071 OSTEOARTHRITIS OF RIGHT ANKLE AND FOOT: ICD-10-CM

## 2019-09-26 DIAGNOSIS — M20.42 HAMMER TOE OF LEFT FOOT: ICD-10-CM

## 2019-09-26 PROCEDURE — 99999 PR PBB SHADOW E&M-EST. PATIENT-LVL III: ICD-10-PCS | Mod: PBBFAC,,, | Performed by: PODIATRIST

## 2019-09-26 PROCEDURE — 99999 PR PBB SHADOW E&M-EST. PATIENT-LVL III: CPT | Mod: PBBFAC,,, | Performed by: PODIATRIST

## 2019-09-26 PROCEDURE — 99213 OFFICE O/P EST LOW 20 MIN: CPT | Mod: S$PBB,,, | Performed by: PODIATRIST

## 2019-09-26 PROCEDURE — 99213 PR OFFICE/OUTPT VISIT, EST, LEVL III, 20-29 MIN: ICD-10-PCS | Mod: S$PBB,,, | Performed by: PODIATRIST

## 2019-09-26 PROCEDURE — 99213 OFFICE O/P EST LOW 20 MIN: CPT | Mod: PBBFAC,PN | Performed by: PODIATRIST

## 2019-09-26 NOTE — LETTER
September 29, 2019      Sonny Cerna III, MD  [address]  [place] [address]           [facility] - Podiatry/Wound Care  [facility] [facility] [place] [address]  Phone: [phone]  Fax: [phone]          Patient: Elmer Cunha   MR Number: [id number]   YOB: 1955   Date of Visit: 9/26/2019       Dear Dr. Sonny Cerna III:    Thank you for referring Elmer Cunha to me for evaluation. Attached you will find relevant portions of my assessment and plan of care.    If you have questions, please do not hesitate to call me. I look forward to following Elmer Cunha along with you.    Sincerely,    Nilay Cox, DPANNIKA    Enclosure  CC:  No Recipients    If you would like to receive this communication electronically, please contact [email or web address] or [phone] to request more information on [employer] Link access.    For providers and/or their staff who would like to refer a patient to [facility], please contact us through our one-stop-shop provider referral line, [facility], at [phone].    If you feel you have received this communication in error or would no longer like to receive these types of communications, please e-mail [email or web address]

## 2019-09-29 NOTE — PROGRESS NOTES
Subjective:       Patient ID: Elmer Cunha is a 64 y.o. male.    Chief Complaint:       Patient is also complaining about a painful ingrown toenail on his left great toe.  Nail Problem   Associated symptoms include arthralgias and joint swelling.   Foot Pain   Associated symptoms include arthralgias and joint swelling.   Follow-up   Associated symptoms include arthralgias and joint swelling.     Review of Systems   Musculoskeletal: Positive for arthralgias, gait problem and joint swelling.   All other systems reviewed and are negative.      Objective:      Physical Exam   Constitutional: He appears well-developed and well-nourished.   Cardiovascular: Normal rate, regular rhythm and normal heart sounds.   Pulses:       Dorsalis pedis pulses are 1+ on the right side, and 1+ on the left side.        Posterior tibial pulses are 1+ on the right side, and 1+ on the left side.   Pulmonary/Chest: Effort normal and breath sounds normal.   Musculoskeletal: He exhibits edema and tenderness.        Right foot: There is decreased range of motion.   Feet:   Right Foot:   Protective Sensation: 4 sites tested. 4 sites sensed.   Skin Integrity: Positive for erythema and warmth.   Left Foot:   Protective Sensation: 4 sites tested. 4 sites sensed.   Neurological: He is alert.   Skin: Skin is warm. Capillary refill takes 2 to 3 seconds.   Psychiatric: He has a normal mood and affect. His behavior is normal. Judgment and thought content normal.   Nursing note and vitals reviewed.        Patient is noted to have an ingrowing toenail secondary to fungal involvement with early signs of infection on the patient's left hallux tenderness and discomfort is noted      Assessment:       1. Ingrown nail    2. Osteoarthritis of right ankle and foot    3. Hammer toe of left foot        Plan:                 On evaluation patient has findings consistent with infected ingrown toenail of the left hallux there is some minor nail that is growing into  the medial border with early signs of infection noted this required debridement however nail avulsion is not necessary at this time patient needs to monitor this area closely.  Patient had diffuse callus formation which puts him at risk for ulceration no current signs of infection noted at this time in these areas. Total face-to-face time including discussion evaluation removal ingrown toenail from the left great toe and non excisional debridement of the hyperkeratotic lesions the put the patient at risk for ulceration equaled 15 min.  Patient's evaluation is not related to previous surgery on the right foot but the result of an ingrowing toenail on the left hallux unrelated evaluation management performed.  Patient relates that he read he recently had brain surgery to remove fluid on his brain successfully since I last saw him and has recovered very well. Patient has a digital contracture 5th digit left this was debrided today non excisionally SAMMI opposed pad applied to the area patient stated the area felt much better he is scheduled to have a knee replacement performed in October.  Knee replacement will be on the left side.  Patient states that he want to come in and make sure that his ingrown toenails were taking care of before his knee replacement October 8, 2019.This note was created using [employer] voice recognition software that occasionally misinterpreted phrases or words.

## 2019-11-21 ENCOUNTER — OFFICE VISIT (OUTPATIENT)
Dept: PODIATRY | Facility: CLINIC | Age: 64
End: 2019-11-21
Payer: MEDICARE

## 2019-11-21 VITALS
HEIGHT: 71 IN | DIASTOLIC BLOOD PRESSURE: 77 MMHG | SYSTOLIC BLOOD PRESSURE: 141 MMHG | TEMPERATURE: 98 F | BODY MASS INDEX: 35.42 KG/M2 | WEIGHT: 253 LBS | HEART RATE: 93 BPM

## 2019-11-21 DIAGNOSIS — L60.0 INGROWN NAIL: Primary | ICD-10-CM

## 2019-11-21 DIAGNOSIS — L97.501 ULCER OF FOOT, LIMITED TO BREAKDOWN OF SKIN, UNSPECIFIED LATERALITY: ICD-10-CM

## 2019-11-21 DIAGNOSIS — M19.071 OSTEOARTHRITIS OF RIGHT ANKLE AND FOOT: ICD-10-CM

## 2019-11-21 PROCEDURE — 99213 OFFICE O/P EST LOW 20 MIN: CPT | Mod: PBBFAC,PN | Performed by: PODIATRIST

## 2019-11-21 PROCEDURE — 99213 PR OFFICE/OUTPT VISIT, EST, LEVL III, 20-29 MIN: ICD-10-PCS | Mod: S$PBB,,, | Performed by: PODIATRIST

## 2019-11-21 PROCEDURE — 99999 PR PBB SHADOW E&M-EST. PATIENT-LVL III: ICD-10-PCS | Mod: PBBFAC,,, | Performed by: PODIATRIST

## 2019-11-21 PROCEDURE — 99213 OFFICE O/P EST LOW 20 MIN: CPT | Mod: S$PBB,,, | Performed by: PODIATRIST

## 2019-11-21 PROCEDURE — 99999 PR PBB SHADOW E&M-EST. PATIENT-LVL III: CPT | Mod: PBBFAC,,, | Performed by: PODIATRIST

## 2019-11-21 RX ORDER — METOPROLOL TARTRATE 25 MG/1
25 TABLET, FILM COATED ORAL
Refills: 11 | COMMUNITY
Start: 2019-10-24 | End: 2022-04-21 | Stop reason: SDUPTHER

## 2019-11-21 RX ORDER — RIVAROXABAN 10 MG/1
TABLET, FILM COATED ORAL
Refills: 0 | COMMUNITY
Start: 2019-10-09 | End: 2020-02-26

## 2019-11-21 NOTE — LETTER
November 24, 2019      Sonny Cerna III, MD  [address]  [place] [address]           [facility] - Podiatry/Wound Care  [facility] [facility] [place] [address]  Phone: [phone]  Fax: [phone]          Patient: Elmer Cunha   MR Number: [id number]   YOB: 1955   Date of Visit: 11/21/2019       Dear Dr. Sonny Cerna III:    Thank you for referring Elmer Cunha to me for evaluation. Attached you will find relevant portions of my assessment and plan of care.    If you have questions, please do not hesitate to call me. I look forward to following Elmer Cunha along with you.    Sincerely,    Nilay Cox, DPANNIKA    Enclosure  CC:  No Recipients    If you would like to receive this communication electronically, please contact [email or web address] or [phone] to request more information on [email or web address] Link access.    For providers and/or their staff who would like to refer a patient to [facility], please contact us through our one-stop-shop provider referral line, [facility], at [phone].    If you feel you have received this communication in error or would no longer like to receive these types of communications, please e-mail [email or web address]

## 2019-11-24 PROBLEM — L97.501 ULCER OF FOOT, LIMITED TO BREAKDOWN OF SKIN: Status: ACTIVE | Noted: 2019-11-24

## 2019-11-24 PROBLEM — L60.0 INGROWN NAIL: Status: ACTIVE | Noted: 2019-11-24

## 2019-11-24 NOTE — PROGRESS NOTES
Subjective:       Patient ID: Elmer Cunha is a 64 y.o. male.    Chief Complaint:       Patient is also complaining about a painful ingrown toenail on his left great toe.  Follow-up   Associated symptoms include arthralgias and joint swelling.   Nail Problem   Associated symptoms include arthralgias and joint swelling.   Foot Pain   Associated symptoms include arthralgias and joint swelling.     Review of Systems   Musculoskeletal: Positive for arthralgias, gait problem and joint swelling.   All other systems reviewed and are negative.      Objective:      Physical Exam   Constitutional: He appears well-developed and well-nourished.   Cardiovascular: Normal rate, regular rhythm and normal heart sounds.   Pulses:       Dorsalis pedis pulses are 1+ on the right side, and 1+ on the left side.        Posterior tibial pulses are 1+ on the right side, and 1+ on the left side.   Pulmonary/Chest: Effort normal and breath sounds normal.   Musculoskeletal: He exhibits edema and tenderness.        Right foot: There is decreased range of motion.   Feet:   Right Foot:   Protective Sensation: 4 sites tested. 4 sites sensed.   Skin Integrity: Positive for erythema and warmth.   Left Foot:   Protective Sensation: 4 sites tested. 4 sites sensed.   Neurological: He is alert.   Skin: Skin is warm. Capillary refill takes 2 to 3 seconds.   Psychiatric: He has a normal mood and affect. His behavior is normal. Judgment and thought content normal.   Nursing note and vitals reviewed.        Patient is noted to have an ingrowing toenail secondary to fungal involvement with early signs of infection on the patient's left hallux tenderness and discomfort is noted      Assessment:       1. Ingrown nail    2. Osteoarthritis of right ankle and foot    3. Ulcer of foot, limited to breakdown of skin, unspecified laterality        Plan:                 On evaluation patient has findings consistent with infected ingrown toenail of the left hallux  there is some minor nail that is growing into the medial border with early signs of infection noted this required debridement however nail avulsion is not necessary at this time patient needs to monitor this area closely.  Patient had diffuse callus formation which puts him at risk for ulceration no current signs of infection noted at this time in these areas. Total face-to-face time including discussion evaluation removal ingrown toenail from the left great toe and non excisional debridement of the hyperkeratotic lesions the put the patient at risk for ulceration equaled 15 min.  Patient's evaluation is not related to previous surgery on the right foot but the result of an ingrowing toenail on the left hallux unrelated evaluation management performed.  Patient relates that he read he recently had brain surgery to remove fluid on his brain successfully since I last saw him and has recovered very well. Patient has a digital contracture 5th digit left this was debrided today non excisionally SAMMI opposed pad applied to the area patient stated the area felt much better.  Patient continues to recover from knee replacement surgery left side he states he is doing a lot better he is just getting around very slowly but is improving since the procedure. Patient had a significant amount of purulent drainage emitting from the patient's left hallux nail this appeared to be a sterile abscess underlying the nail I did not do a culture and sensitivity nor did I put the patient on an antibiotic at this time the area was flushed irrigated with copious amounts of sterile saline bacitracin ointment and a light dressing applied patient was given instructions to soak the area I do plan to follow up with the patient in 2 weeks he has been advised to contact us with any problems questions or concerns prior to scheduled appointment time.  This note was created using [employer] voice recognition software that occasionally misinterpreted phrases  or words.

## 2019-12-05 ENCOUNTER — OFFICE VISIT (OUTPATIENT)
Dept: PODIATRY | Facility: CLINIC | Age: 64
End: 2019-12-05
Payer: MEDICARE

## 2019-12-05 VITALS
HEIGHT: 71 IN | BODY MASS INDEX: 35.56 KG/M2 | HEART RATE: 76 BPM | DIASTOLIC BLOOD PRESSURE: 76 MMHG | WEIGHT: 254 LBS | SYSTOLIC BLOOD PRESSURE: 143 MMHG | TEMPERATURE: 97 F

## 2019-12-05 DIAGNOSIS — L97.501 ULCER OF FOOT, LIMITED TO BREAKDOWN OF SKIN, UNSPECIFIED LATERALITY: ICD-10-CM

## 2019-12-05 DIAGNOSIS — L60.0 INGROWN NAIL: Primary | ICD-10-CM

## 2019-12-05 PROCEDURE — 99999 PR PBB SHADOW E&M-EST. PATIENT-LVL III: ICD-10-PCS | Mod: PBBFAC,,, | Performed by: PODIATRIST

## 2019-12-05 PROCEDURE — 99213 OFFICE O/P EST LOW 20 MIN: CPT | Mod: S$PBB,,, | Performed by: PODIATRIST

## 2019-12-05 PROCEDURE — 99213 OFFICE O/P EST LOW 20 MIN: CPT | Mod: PBBFAC,PN | Performed by: PODIATRIST

## 2019-12-05 PROCEDURE — 99213 PR OFFICE/OUTPT VISIT, EST, LEVL III, 20-29 MIN: ICD-10-PCS | Mod: S$PBB,,, | Performed by: PODIATRIST

## 2019-12-05 PROCEDURE — 99999 PR PBB SHADOW E&M-EST. PATIENT-LVL III: CPT | Mod: PBBFAC,,, | Performed by: PODIATRIST

## 2019-12-05 NOTE — LETTER
December 8, 2019      Sonny Cerna III, MD  [address]  [place] [address]           [facility] - Podiatry/Wound Care  [facility] [facility] [place] [address]  Phone: [phone]  Fax: [phone]          Patient: Elmer Cunha   MR Number: [id number]   YOB: 1955   Date of Visit: 12/5/2019       Dear Dr. Sonny Cerna III:    Thank you for referring Elmer Cunha to me for evaluation. Attached you will find relevant portions of my assessment and plan of care.    If you have questions, please do not hesitate to call me. I look forward to following Elmer Cunha along with you.    Sincerely,    Nilay Cox, DPANNIKA    Enclosure  CC:  No Recipients    If you would like to receive this communication electronically, please contact [email or web address] or [phone] to request more information on [employer] Link access.    For providers and/or their staff who would like to refer a patient to [facility], please contact us through our one-stop-shop provider referral line, [facility], at [phone].    If you feel you have received this communication in error or would no longer like to receive these types of communications, please e-mail [email or web address]

## 2019-12-08 NOTE — PROGRESS NOTES
Subjective:       Patient ID: Elmer Cunha is a 64 y.o. male.    Chief Complaint:       Patient is also complaining about a painful ingrown toenail on his left great toe.  Follow-up   Associated symptoms include arthralgias and joint swelling.   Nail Problem   Associated symptoms include arthralgias and joint swelling.   Foot Pain   Associated symptoms include arthralgias and joint swelling.     Review of Systems   Musculoskeletal: Positive for arthralgias, gait problem and joint swelling.   All other systems reviewed and are negative.      Objective:      Physical Exam   Constitutional: He appears well-developed and well-nourished.   Cardiovascular: Normal rate, regular rhythm and normal heart sounds.   Pulses:       Dorsalis pedis pulses are 1+ on the right side, and 1+ on the left side.        Posterior tibial pulses are 1+ on the right side, and 1+ on the left side.   Pulmonary/Chest: Effort normal and breath sounds normal.   Musculoskeletal: He exhibits edema and tenderness.        Right foot: There is decreased range of motion.   Feet:   Right Foot:   Protective Sensation: 4 sites tested. 4 sites sensed.   Skin Integrity: Positive for erythema and warmth.   Left Foot:   Protective Sensation: 4 sites tested. 4 sites sensed.   Neurological: He is alert.   Skin: Skin is warm. Capillary refill takes 2 to 3 seconds.   Psychiatric: He has a normal mood and affect. His behavior is normal. Judgment and thought content normal.   Nursing note and vitals reviewed.        Patient is noted to have an ingrowing toenail secondary to fungal involvement with early signs of infection on the patient's left hallux tenderness and discomfort is noted      Assessment:       1. Ingrown nail    2. Ulcer of foot, limited to breakdown of skin, unspecified laterality        Plan:                 On evaluation patient has findings consistent with infected ingrown toenail of the left hallux there is some minor nail that is growing into  the medial border with early signs of infection noted this required debridement however nail avulsion is not necessary at this time patient needs to monitor this area closely.  Patient had diffuse callus formation which puts him at risk for ulceration no current signs of infection noted at this time in these areas. Total face-to-face time including discussion evaluation removal ingrown toenail from the left great toe and non excisional debridement of the hyperkeratotic lesions the put the patient at risk for ulceration equaled 15 min.  Patient's evaluation is not related to previous surgery on the right foot but the result of an ingrowing toenail on the left hallux unrelated evaluation management performed.  Patient relates that he read he recently had brain surgery to remove fluid on his brain successfully since I last saw him and has recovered very well. Patient has a digital contracture 5th digit left this was debrided today non excisionally SAMMI opposed pad applied to the area patient stated the area felt much better.  Patient continues to recover from knee replacement surgery left side he states he is doing a lot better he is just getting around very slowly but is improving since the procedure. Patient had a mild amount of purulent drainage emitting from the patient's left hallux nail this appeared to be a sterile abscess underlying the nail I did not do a culture and sensitivity nor did I put the patient on an antibiotic at this time the area was flushed irrigated with copious amounts of sterile saline bacitracin ointment and a light dressing applied patient was given instructions to soak the area I do plan to follow up with the patient in 6 weeks he has been advised to contact us with any problems questions or concerns prior to scheduled appointment time.  Patient indicates his left big toe feel so much better after the debridement on last visit there was some additional nail in their today that required  debridement patient advised if this flares up prior to his scheduled appointment in 6 weeks he is to contact us immediately.  Patient is progressing following knee replacement he is currently using a cane.  This note was created using [employer] voice recognition software that occasionally misinterpreted phrases or words.

## 2020-01-03 PROBLEM — E11.9 TYPE 2 DIABETES MELLITUS WITHOUT COMPLICATION, WITHOUT LONG-TERM CURRENT USE OF INSULIN: Status: ACTIVE | Noted: 2020-01-03

## 2020-01-16 ENCOUNTER — OFFICE VISIT (OUTPATIENT)
Dept: PODIATRY | Facility: CLINIC | Age: 65
End: 2020-01-16
Payer: MEDICARE

## 2020-01-16 VITALS
HEIGHT: 71 IN | BODY MASS INDEX: 35.14 KG/M2 | WEIGHT: 251 LBS | TEMPERATURE: 98 F | SYSTOLIC BLOOD PRESSURE: 147 MMHG | HEART RATE: 59 BPM | DIASTOLIC BLOOD PRESSURE: 65 MMHG

## 2020-01-16 DIAGNOSIS — M19.071 OSTEOARTHRITIS OF RIGHT ANKLE AND FOOT: ICD-10-CM

## 2020-01-16 DIAGNOSIS — L60.0 INGROWN NAIL: ICD-10-CM

## 2020-01-16 DIAGNOSIS — L97.501 ULCER OF FOOT, LIMITED TO BREAKDOWN OF SKIN, UNSPECIFIED LATERALITY: ICD-10-CM

## 2020-01-16 DIAGNOSIS — E11.9 TYPE 2 DIABETES MELLITUS WITHOUT COMPLICATION, WITHOUT LONG-TERM CURRENT USE OF INSULIN: Primary | ICD-10-CM

## 2020-01-16 DIAGNOSIS — M20.42 HAMMER TOE OF LEFT FOOT: ICD-10-CM

## 2020-01-16 PROCEDURE — 99213 OFFICE O/P EST LOW 20 MIN: CPT | Mod: PBBFAC,PN | Performed by: PODIATRIST

## 2020-01-16 PROCEDURE — 99999 PR PBB SHADOW E&M-EST. PATIENT-LVL III: ICD-10-PCS | Mod: PBBFAC,,, | Performed by: PODIATRIST

## 2020-01-16 PROCEDURE — 99213 OFFICE O/P EST LOW 20 MIN: CPT | Mod: S$PBB,,, | Performed by: PODIATRIST

## 2020-01-16 PROCEDURE — 99213 PR OFFICE/OUTPT VISIT, EST, LEVL III, 20-29 MIN: ICD-10-PCS | Mod: S$PBB,,, | Performed by: PODIATRIST

## 2020-01-16 PROCEDURE — 99999 PR PBB SHADOW E&M-EST. PATIENT-LVL III: CPT | Mod: PBBFAC,,, | Performed by: PODIATRIST

## 2020-01-16 RX ORDER — FESOTERODINE FUMARATE 4 MG/1
TABLET, FILM COATED, EXTENDED RELEASE ORAL
COMMUNITY
Start: 2020-01-07 | End: 2020-02-26

## 2020-01-16 NOTE — LETTER
January 19, 2020      Sonny Cerna III, MD  [address]  [place] [address]           [facility] - Podiatry/Wound Care  [facility] [facility] [place] [address]  Phone: [phone]  Fax: [phone]          Patient: Elmer Cunha   MR Number: [id number]   YOB: 1955   Date of Visit: 1/16/2020       Dear Dr. oSnny Cerna III:    Thank you for referring Elmer Cunha to me for evaluation. Attached you will find relevant portions of my assessment and plan of care.    If you have questions, please do not hesitate to call me. I look forward to following Elmer Cunha along with you.    Sincerely,    Nilay Cox, DPANNIKA    Enclosure  CC:  No Recipients    If you would like to receive this communication electronically, please contact [email or web address] or [phone] to request more information on [employer] Link access.    For providers and/or their staff who would like to refer a patient to [facility], please contact us through our one-stop-shop provider referral line, [facility], at [phone].    If you feel you have received this communication in error or would no longer like to receive these types of communications, please e-mail [email or web address]

## 2020-01-19 NOTE — PROGRESS NOTES
Subjective:       Patient ID: Elmer Cunha is a 64 y.o. male.    Chief Complaint:       Patient is also complaining about a painful ingrown toenail on his left great toe.  Follow-up   Associated symptoms include arthralgias and joint swelling.   Nail Problem   Associated symptoms include arthralgias and joint swelling.   Foot Pain   Associated symptoms include arthralgias and joint swelling.     Review of Systems   Musculoskeletal: Positive for arthralgias, gait problem and joint swelling.   All other systems reviewed and are negative.      Objective:      Physical Exam   Constitutional: He appears well-developed and well-nourished.   Cardiovascular: Normal rate, regular rhythm and normal heart sounds.   Pulses:       Dorsalis pedis pulses are 1+ on the right side, and 1+ on the left side.        Posterior tibial pulses are 1+ on the right side, and 1+ on the left side.   Pulmonary/Chest: Effort normal and breath sounds normal.   Musculoskeletal: He exhibits edema and tenderness.        Right foot: There is decreased range of motion.   Feet:   Right Foot:   Protective Sensation: 4 sites tested. 4 sites sensed.   Skin Integrity: Positive for erythema and warmth.   Left Foot:   Protective Sensation: 4 sites tested. 4 sites sensed.   Neurological: He is alert.   Skin: Skin is warm. Capillary refill takes 2 to 3 seconds.   Psychiatric: He has a normal mood and affect. His behavior is normal. Judgment and thought content normal.   Nursing note and vitals reviewed.        Patient is noted to have an ingrowing toenail secondary to fungal involvement with early signs of infection on the patient's left hallux tenderness and discomfort is noted      Assessment:       1. Type 2 diabetes mellitus without complication, without long-term current use of insulin    2. Ingrown nail    3. Ulcer of foot, limited to breakdown of skin, unspecified laterality    4. Hammer toe of left foot    5. Osteoarthritis of right ankle and foot         Plan:                 On evaluation patient has findings consistent with infected ingrown toenail of the left hallux there is some minor nail that is growing into the medial border with early signs of infection noted this required debridement however nail avulsion is not necessary at this time patient needs to monitor this area closely.  Patient had diffuse callus formation which puts him at risk for ulceration no current signs of infection noted at this time in these areas. Total face-to-face time including discussion evaluation removal ingrown toenail from the left great toe and non excisional debridement of the hyperkeratotic lesions the put the patient at risk for ulceration equaled 15 min.  Patient's evaluation is not related to previous surgery on the right foot but the result of an ingrowing toenail on the left hallux unrelated evaluation management performed.  Patient relates that he read he recently had brain surgery to remove fluid on his brain successfully since I last saw him and has recovered very well. Patient has a digital contracture 5th digit left this was debrided today non excisionally SAMMI opposed pad applied to the area patient stated the area felt much better.  Patient continues to recover from knee replacement surgery left side he states he is doing a lot better he is just getting around very slowly but is improving since the procedure. Patient had a mild amount of purulent drainage emitting from the patient's left hallux nail this appeared to be a sterile abscess underlying the nail I did not do a culture and sensitivity nor did I put the patient on an antibiotic at this time the area was flushed irrigated with copious amounts of sterile saline bacitracin ointment and a light dressing applied patient was given instructions to soak the area I do plan to follow up with the patient in 6 weeks he has been advised to contact us with any problems questions or concerns prior to scheduled  appointment time.  Patient indicates his left big toe feel so much better after the debridement on last visit there was some additional nail in their today that required debridement patient advised if this flares up prior to his scheduled appointment in 6 weeks he is to contact us immediately.  Patient is progressing following knee replacement he is currently using a cane.  This note was created using [employer] voice recognition software that occasionally misinterpreted phrases or words.

## 2020-02-27 ENCOUNTER — OFFICE VISIT (OUTPATIENT)
Dept: PODIATRY | Facility: CLINIC | Age: 65
End: 2020-02-27
Payer: MEDICARE

## 2020-02-27 VITALS
WEIGHT: 252 LBS | HEART RATE: 95 BPM | TEMPERATURE: 98 F | SYSTOLIC BLOOD PRESSURE: 144 MMHG | BODY MASS INDEX: 35.28 KG/M2 | DIASTOLIC BLOOD PRESSURE: 91 MMHG | HEIGHT: 71 IN

## 2020-02-27 DIAGNOSIS — L60.0 INGROWN NAIL: ICD-10-CM

## 2020-02-27 DIAGNOSIS — L97.501 ULCER OF FOOT, LIMITED TO BREAKDOWN OF SKIN, UNSPECIFIED LATERALITY: ICD-10-CM

## 2020-02-27 DIAGNOSIS — E11.9 TYPE 2 DIABETES MELLITUS WITHOUT COMPLICATION, WITHOUT LONG-TERM CURRENT USE OF INSULIN: Primary | ICD-10-CM

## 2020-02-27 PROCEDURE — 99213 OFFICE O/P EST LOW 20 MIN: CPT | Mod: S$PBB,,, | Performed by: PODIATRIST

## 2020-02-27 PROCEDURE — 99213 PR OFFICE/OUTPT VISIT, EST, LEVL III, 20-29 MIN: ICD-10-PCS | Mod: S$PBB,,, | Performed by: PODIATRIST

## 2020-02-27 PROCEDURE — 99999 PR PBB SHADOW E&M-EST. PATIENT-LVL III: CPT | Mod: PBBFAC,,, | Performed by: PODIATRIST

## 2020-02-27 PROCEDURE — 99213 OFFICE O/P EST LOW 20 MIN: CPT | Mod: PBBFAC,PN | Performed by: PODIATRIST

## 2020-02-27 PROCEDURE — 99999 PR PBB SHADOW E&M-EST. PATIENT-LVL III: ICD-10-PCS | Mod: PBBFAC,,, | Performed by: PODIATRIST

## 2020-02-27 RX ORDER — PANTOPRAZOLE SODIUM 40 MG/1
TABLET, DELAYED RELEASE ORAL
Qty: 30 TABLET | Refills: 11 | Status: SHIPPED | OUTPATIENT
Start: 2020-02-27 | End: 2021-02-17

## 2020-02-27 NOTE — PROGRESS NOTES
Subjective:       Patient ID: Elmer Cuhna is a 64 y.o. male.    Chief Complaint:       Patient is also complaining about a painful ingrown toenail on his left great toe.  Nail Problem   Associated symptoms include arthralgias and joint swelling.   Follow-up   Associated symptoms include arthralgias and joint swelling.   Foot Pain   Associated symptoms include arthralgias and joint swelling.     Review of Systems   Musculoskeletal: Positive for arthralgias, gait problem and joint swelling.   All other systems reviewed and are negative.      Objective:      Physical Exam   Constitutional: He appears well-developed and well-nourished.   Cardiovascular: Normal rate, regular rhythm and normal heart sounds.   Pulses:       Dorsalis pedis pulses are 1+ on the right side, and 1+ on the left side.        Posterior tibial pulses are 1+ on the right side, and 1+ on the left side.   Pulmonary/Chest: Effort normal and breath sounds normal.   Musculoskeletal: He exhibits edema and tenderness.        Right foot: There is decreased range of motion.   Feet:   Right Foot:   Protective Sensation: 4 sites tested. 4 sites sensed.   Skin Integrity: Positive for erythema and warmth.   Left Foot:   Protective Sensation: 4 sites tested. 4 sites sensed.   Neurological: He is alert.   Skin: Skin is warm. Capillary refill takes 2 to 3 seconds.   Psychiatric: He has a normal mood and affect. His behavior is normal. Judgment and thought content normal.   Nursing note and vitals reviewed.        Patient is noted to have an ingrowing toenail secondary to fungal involvement with early signs of infection on the patient's left hallux tenderness and discomfort is noted      Assessment:       1. Type 2 diabetes mellitus without complication, without long-term current use of insulin    2. Ulcer of foot, limited to breakdown of skin, unspecified laterality    3. Ingrown nail        Plan:                 On evaluation patient has findings consistent  with infected ingrown toenail of the left hallux there is some minor nail that is growing into the medial border with early signs of infection noted this required debridement however nail avulsion is not necessary at this time patient needs to monitor this area closely.  Patient had diffuse callus formation which puts him at risk for ulceration no current signs of infection noted at this time in these areas. Total face-to-face time including discussion evaluation removal ingrown toenail from the left great toe and non excisional debridement of the hyperkeratotic lesions the put the patient at risk for ulceration equaled 15 min.  Patient's evaluation is not related to previous surgery on the right foot but the result of an ingrowing toenail on the left hallux unrelated evaluation management performed.  Patient relates that he read he recently had brain surgery to remove fluid on his brain successfully since I last saw him and has recovered very well. Patient has a digital contracture 5th digit left this was debrided today non excisionally SAMMI opposed pad applied to the area patient stated the area felt much better.  Patient continues to recover from knee replacement surgery left side he states he is doing a lot better he is just getting around very slowly but is improving since the procedure. Patient had a mild amount of purulent drainage emitting from the patient's left hallux nail this appeared to be a sterile abscess underlying the nail I did not do a culture and sensitivity nor did I put the patient on an antibiotic at this time the area was flushed irrigated with copious amounts of sterile saline bacitracin ointment and a light dressing applied patient was given instructions to soak the area I do plan to follow up with the patient in 6 weeks he has been advised to contact us with any problems questions or concerns prior to scheduled appointment time.  Patient indicates his left big toe feel so much better after  the debridement on last visit there was some additional nail in their today that required debridement patient advised if this flares up prior to his scheduled appointment in 6 weeks he is to contact us immediately.  Patient is progressing following knee replacement he is currently using a cane.  Patient continues to progress following knee replacement left side.  This note was created using [employer] voice recognition software that occasionally misinterpreted phrases or words.

## 2020-02-27 NOTE — LETTER
February 27, 2020      Sonny Cerna III, MD  [address]  [place] [address]           [facility] - Podiatry/Wound Care  [facility] [facility] [place] [address]  Phone: [phone]  Fax: [phone]          Patient: Elmer Cunha   MR Number: [id number]   YOB: 1955   Date of Visit: 2/27/2020       Dear Dr. Sonny Cerna III:    Thank you for referring Elmer Cunha to me for evaluation. Attached you will find relevant portions of my assessment and plan of care.    If you have questions, please do not hesitate to call me. I look forward to following Elmer Cunha along with you.    Sincerely,    Nilay Cox, DPANNIKA    Enclosure  CC:  No Recipients    If you would like to receive this communication electronically, please contact [email or web address] or [phone] to request more information on [employer] Link access.    For providers and/or their staff who would like to refer a patient to [facility], please contact us through our one-stop-shop provider referral line, [facility], at [phone].    If you feel you have received this communication in error or would no longer like to receive these types of communications, please e-mail [email or web address]

## 2020-03-19 ENCOUNTER — TELEPHONE (OUTPATIENT)
Dept: PODIATRY | Facility: CLINIC | Age: 65
End: 2020-03-19

## 2020-03-19 NOTE — TELEPHONE ENCOUNTER
----- Message from Mandie Ashford sent at 3/19/2020 10:53 AM CDT -----  Type:  Sooner Apoointment Request    Caller is requesting a sooner appointment.  Caller declined first available appointment listed below.  Caller will not accept being placed on the waitlist and is requesting a message be sent to doctor.    Name of Caller:  Self   When is the first available appointment?  Symptoms:  Calculus on toe on left foot  Best Call Back Number: [phone]  Additional Information:

## 2020-03-19 NOTE — TELEPHONE ENCOUNTER
----- Message from Lizz Pinto sent at 3/19/2020 12:28 PM CDT -----  Contact: Patient  Type: Needs Medical Advice    Who Called:  Patient  Symptoms (please be specific):  Callus on big toe    Best Call Back Number: [phone]    Additional Information:   Says he has called about this already, does not want to bother but he is in pain.

## 2020-03-25 ENCOUNTER — TELEPHONE (OUTPATIENT)
Dept: PODIATRY | Facility: CLINIC | Age: 65
End: 2020-03-25

## 2020-03-25 NOTE — TELEPHONE ENCOUNTER
LVM for pt appt that was rescheduled on 3/26 will need to be rescheduled. Provider is not in the office at this time.

## 2020-03-30 ENCOUNTER — TELEPHONE (OUTPATIENT)
Dept: PODIATRY | Facility: CLINIC | Age: 65
End: 2020-03-30

## 2020-03-30 NOTE — TELEPHONE ENCOUNTER
----- Message from Dorothy Kohli sent at 3/30/2020  8:13 AM CDT -----  Contact: Pt  Type: Needs medical advice      Who Called:Pt  Best Call Back Number:  [phone]  Additional Information:  Pt requesting a call back to confirm his appt on tomorrow. I have informed the pt that his appt I still scheduled for tomorrow, pt would like a confirmation from Dr. Cox's office.       Please advise. Thank you

## 2020-03-31 ENCOUNTER — OFFICE VISIT (OUTPATIENT)
Dept: PODIATRY | Facility: CLINIC | Age: 65
End: 2020-03-31
Payer: MEDICARE

## 2020-03-31 VITALS
DIASTOLIC BLOOD PRESSURE: 82 MMHG | HEIGHT: 71 IN | SYSTOLIC BLOOD PRESSURE: 133 MMHG | BODY MASS INDEX: 34.86 KG/M2 | HEART RATE: 80 BPM | TEMPERATURE: 99 F | WEIGHT: 249 LBS

## 2020-03-31 DIAGNOSIS — E11.9 TYPE 2 DIABETES MELLITUS WITHOUT COMPLICATION, WITHOUT LONG-TERM CURRENT USE OF INSULIN: Primary | ICD-10-CM

## 2020-03-31 DIAGNOSIS — M19.071 OSTEOARTHRITIS OF RIGHT ANKLE AND FOOT: ICD-10-CM

## 2020-03-31 DIAGNOSIS — L97.501 ULCER OF FOOT, LIMITED TO BREAKDOWN OF SKIN, UNSPECIFIED LATERALITY: ICD-10-CM

## 2020-03-31 DIAGNOSIS — M20.42 HAMMER TOE OF LEFT FOOT: ICD-10-CM

## 2020-03-31 DIAGNOSIS — L60.0 INGROWN NAIL: ICD-10-CM

## 2020-03-31 PROCEDURE — 99213 OFFICE O/P EST LOW 20 MIN: CPT | Mod: S$PBB,,, | Performed by: PODIATRIST

## 2020-03-31 PROCEDURE — 99999 PR PBB SHADOW E&M-EST. PATIENT-LVL III: ICD-10-PCS | Mod: PBBFAC,,, | Performed by: PODIATRIST

## 2020-03-31 PROCEDURE — 99999 PR PBB SHADOW E&M-EST. PATIENT-LVL III: CPT | Mod: PBBFAC,,, | Performed by: PODIATRIST

## 2020-03-31 PROCEDURE — 99213 OFFICE O/P EST LOW 20 MIN: CPT | Mod: PBBFAC,PN | Performed by: PODIATRIST

## 2020-03-31 PROCEDURE — 99213 PR OFFICE/OUTPT VISIT, EST, LEVL III, 20-29 MIN: ICD-10-PCS | Mod: S$PBB,,, | Performed by: PODIATRIST

## 2020-03-31 RX ORDER — LANCETS 30 GAUGE
EACH MISCELLANEOUS
COMMUNITY
Start: 2020-03-13 | End: 2020-09-23 | Stop reason: SDUPTHER

## 2020-03-31 RX ORDER — CALCIUM CITRATE/VITAMIN D3 200MG-6.25
TABLET ORAL
COMMUNITY
Start: 2020-03-13 | End: 2020-09-23 | Stop reason: SDUPTHER

## 2020-03-31 RX ORDER — LATANOPROST 50 UG/ML
SOLUTION/ DROPS OPHTHALMIC
COMMUNITY
Start: 2020-03-24 | End: 2021-04-13

## 2020-03-31 NOTE — LETTER
March 31, 2020      MD Palma Hsu III[address]  [place] [address]           [facility] - Podiatry/Wound Care  [facility] [facility] [place] [address]  Phone: [phone]  Fax: [phone]          Patient: Elmer Cunha   MR Number: [id number]   YOB: 1955   Date of Visit: 3/31/2020       Dear Dr. Sonny Cerna III:    Thank you for referring Elmer Cunha to me for evaluation. Attached you will find relevant portions of my assessment and plan of care.    If you have questions, please do not hesitate to call me. I look forward to following Elmer Cunha along with you.    Sincerely,    Nilay Cox, DPANNIKA    Enclosure  CC:  No Recipients    If you would like to receive this communication electronically, please contact [email or web address] or [phone] to request more information on [employer] Link access.    For providers and/or their staff who would like to refer a patient to [facility], please contact us through our one-stop-shop provider referral line, [facility], at [phone].    If you feel you have received this communication in error or would no longer like to receive these types of communications, please e-mail [email or web address]

## 2020-03-31 NOTE — PROGRESS NOTES
Subjective:       Patient ID: Elmre Cunha is a 64 y.o. male.    Chief Complaint:       Patient is also complaining about a painful ingrown toenail on his left great toe.  Follow-up   Associated symptoms include arthralgias and joint swelling.   Nail Problem   Associated symptoms include arthralgias and joint swelling.   Foot Pain   Associated symptoms include arthralgias and joint swelling.     Review of Systems   Musculoskeletal: Positive for arthralgias, gait problem and joint swelling.   All other systems reviewed and are negative.      Objective:      Physical Exam   Constitutional: He appears well-developed and well-nourished.   Cardiovascular: Normal rate, regular rhythm and normal heart sounds.   Pulses:       Dorsalis pedis pulses are 1+ on the right side, and 1+ on the left side.        Posterior tibial pulses are 1+ on the right side, and 1+ on the left side.   Pulmonary/Chest: Effort normal and breath sounds normal.   Musculoskeletal: He exhibits edema and tenderness.        Right foot: There is decreased range of motion.   Feet:   Right Foot:   Protective Sensation: 4 sites tested. 4 sites sensed.   Skin Integrity: Positive for erythema and warmth.   Left Foot:   Protective Sensation: 4 sites tested. 4 sites sensed.   Neurological: He is alert.   Skin: Skin is warm. Capillary refill takes 2 to 3 seconds.   Psychiatric: He has a normal mood and affect. His behavior is normal. Judgment and thought content normal.   Nursing note and vitals reviewed.        Patient is noted to have an ingrowing toenail secondary to fungal involvement with early signs of infection on the patient's left hallux tenderness and discomfort is noted              Assessment:       1. Type 2 diabetes mellitus without complication, without long-term current use of insulin    2. Ulcer of foot, limited to breakdown of skin, unspecified laterality    3. Ingrown nail    4. Hammer toe of left foot    5. Osteoarthritis of right ankle  and foot        Plan:                Patient presents today for follow-up diabetic evaluation he has a history of hyperkeratotic pre ulcerative lesions on the 5th digits bilateral he also has them on both big toes he states the left great toe has gotten extremely painful sore the tissues built up he says there is dry blood on the area and it has been excruciatingly painful and difficult for him to walk.  Complete diabetic evaluation performed today.  Patient does have an extensive history chronically ingrown toenails that have become infected previously he does have an ulcerative lesion secondary to degenerative arthritic spurring on the medial aspect of the patient's left hallux non excisional debridement of the area revealed healthy underlying skin and tissue there is some dry blood under the area no signs of infection noted there is however a palpable spur underneath this area.  Patient had several ingrown toenails today especially in the big toes that were removed I did non excisionally debride the 5th digits bilateral for a pre ulcerative area I have advised patient to monitor his feet closely he indicates he does want to pursue surgery to remove the bone spur on the left great toe once the corona virus outbreak is controlled and we can do elective surgery again right now are not currently doing any elective surgery until further notice I have recommended conservative care for the area I debrided the area non excisionally I have applied a skin protectant ointment to the area with a light bandage gave the patient samples of this and recommended that he purchase some and put this on the area daily to prevent any further breakdown her buildup of callus.  Patient was in understanding and agreement with this follow-up 2 months patient advised to contact us with any problems questions or concerns prior to that time.  Total face-to-face time equaled 20 min.  This note was created using [employer] voice recognition  software that occasionally misinterpreted phrases or words.

## 2020-04-07 ENCOUNTER — HOSPITAL ENCOUNTER (OUTPATIENT)
Dept: RADIOLOGY | Facility: HOSPITAL | Age: 65
Discharge: HOME | End: 2020-04-07
Attending: NURSE PRACTITIONER
Payer: MEDICARE

## 2020-04-07 DIAGNOSIS — I10 ESSENTIAL HYPERTENSION: ICD-10-CM

## 2020-04-07 DIAGNOSIS — G89.29 CHRONIC BILATERAL LOW BACK PAIN WITHOUT SCIATICA: ICD-10-CM

## 2020-04-07 DIAGNOSIS — H53.9 VISION DISTURBANCE: ICD-10-CM

## 2020-04-07 DIAGNOSIS — M51.36 DDD (DEGENERATIVE DISC DISEASE), LUMBAR: ICD-10-CM

## 2020-04-07 DIAGNOSIS — R42 DIZZINESS: ICD-10-CM

## 2020-04-07 DIAGNOSIS — Z86.73 HISTORY OF TIA (TRANSIENT ISCHEMIC ATTACK): ICD-10-CM

## 2020-04-07 DIAGNOSIS — M54.50 CHRONIC BILATERAL LOW BACK PAIN WITHOUT SCIATICA: ICD-10-CM

## 2020-04-20 PROBLEM — M79.672 LEFT FOOT PAIN: Status: ACTIVE | Noted: 2020-04-20

## 2020-04-21 ENCOUNTER — OFFICE VISIT (OUTPATIENT)
Dept: PODIATRY | Facility: CLINIC | Age: 65
End: 2020-04-21
Payer: MEDICARE

## 2020-04-21 VITALS
TEMPERATURE: 98 F | BODY MASS INDEX: 34.86 KG/M2 | HEIGHT: 71 IN | SYSTOLIC BLOOD PRESSURE: 137 MMHG | HEART RATE: 86 BPM | DIASTOLIC BLOOD PRESSURE: 89 MMHG | WEIGHT: 249 LBS

## 2020-04-21 DIAGNOSIS — M20.42 HAMMER TOE OF LEFT FOOT: ICD-10-CM

## 2020-04-21 DIAGNOSIS — M19.071 OSTEOARTHRITIS OF RIGHT ANKLE AND FOOT: Primary | ICD-10-CM

## 2020-04-21 DIAGNOSIS — L97.501 ULCER OF FOOT, LIMITED TO BREAKDOWN OF SKIN, UNSPECIFIED LATERALITY: ICD-10-CM

## 2020-04-21 DIAGNOSIS — E11.9 TYPE 2 DIABETES MELLITUS WITHOUT COMPLICATION, WITHOUT LONG-TERM CURRENT USE OF INSULIN: ICD-10-CM

## 2020-04-21 DIAGNOSIS — M79.672 LEFT FOOT PAIN: ICD-10-CM

## 2020-04-21 PROCEDURE — 99213 OFFICE O/P EST LOW 20 MIN: CPT | Mod: S$PBB,,, | Performed by: PODIATRIST

## 2020-04-21 PROCEDURE — 99213 OFFICE O/P EST LOW 20 MIN: CPT | Mod: PBBFAC,PN | Performed by: PODIATRIST

## 2020-04-21 PROCEDURE — 99999 PR PBB SHADOW E&M-EST. PATIENT-LVL III: CPT | Mod: PBBFAC,,, | Performed by: PODIATRIST

## 2020-04-21 PROCEDURE — 99999 PR PBB SHADOW E&M-EST. PATIENT-LVL III: ICD-10-PCS | Mod: PBBFAC,,, | Performed by: PODIATRIST

## 2020-04-21 PROCEDURE — 99213 PR OFFICE/OUTPT VISIT, EST, LEVL III, 20-29 MIN: ICD-10-PCS | Mod: S$PBB,,, | Performed by: PODIATRIST

## 2020-04-21 NOTE — PROGRESS NOTES
Subjective:       Patient ID: Elmer Cunha is a 64 y.o. male.    Chief Complaint:       Patient is also complaining about a painful ingrown toenail on his left great toe.  Follow-up   Associated symptoms include arthralgias and joint swelling.   Nail Problem   Associated symptoms include arthralgias and joint swelling.   Foot Pain   Associated symptoms include arthralgias and joint swelling.     Review of Systems   Musculoskeletal: Positive for arthralgias, gait problem and joint swelling.   All other systems reviewed and are negative.      Objective:      Physical Exam   Constitutional: He appears well-developed and well-nourished.   Cardiovascular: Normal rate, regular rhythm and normal heart sounds.   Pulses:       Dorsalis pedis pulses are 1+ on the right side, and 1+ on the left side.        Posterior tibial pulses are 1+ on the right side, and 1+ on the left side.   Pulmonary/Chest: Effort normal and breath sounds normal.   Musculoskeletal: He exhibits edema and tenderness.        Right foot: There is decreased range of motion.   Feet:   Right Foot:   Protective Sensation: 4 sites tested. 4 sites sensed.   Skin Integrity: Positive for erythema and warmth.   Left Foot:   Protective Sensation: 4 sites tested. 4 sites sensed.   Neurological: He is alert.   Skin: Skin is warm. Capillary refill takes 2 to 3 seconds.   Psychiatric: He has a normal mood and affect. His behavior is normal. Judgment and thought content normal.   Nursing note and vitals reviewed.        Patient is noted to have an ingrowing toenail secondary to fungal involvement with early signs of infection on the patient's left hallux tenderness and discomfort is noted              Assessment:       1. Osteoarthritis of right ankle and foot    2. Left foot pain    3. Hammer toe of left foot    4. Type 2 diabetes mellitus without complication, without long-term current use of insulin    5. Ulcer of foot, limited to breakdown of skin, unspecified  laterality        Plan:                Patient presents today for follow-up diabetic evaluation he has a history of hyperkeratotic pre ulcerative lesions on the 5th digits bilateral he also has them on both big toes he states the left great toe has gotten extremely painful sore the tissues built up he says there is dry blood on the area and it has been excruciatingly painful and difficult for him to walk.  Complete diabetic evaluation performed today.  Patient does have an extensive history chronically ingrown toenails that have become infected previously he does have an ulcerative lesion secondary to degenerative arthritic spurring on the medial aspect of the patient's left hallux non excisional debridement of the area revealed healthy underlying skin and tissue there is some dry blood under the area no signs of infection noted there is however a palpable spur underneath this area.  Patient had several ingrown toenails today especially in the big toes that were removed I did non excisionally debride the 5th digits bilateral for a pre ulcerative area I have advised patient to monitor his feet closely he indicates he does want to pursue surgery to remove the bone spur on the left great toe once the corona virus outbreak is controlled and we can do elective surgery again right now are not currently doing any elective surgery until further notice I have recommended conservative care for the area I debrided the area non excisionally I have applied a skin protectant ointment to the area with a light bandage gave the patient samples of this and recommended that he purchase some and put this on the area daily to prevent any further breakdown her buildup of callus.  Patient was in understanding and agreement with this follow-up 2 months patient advised to contact us with any problems questions or concerns prior to that time.  Total face-to-face time equaled 20 min.    Patient advised I will contact him when we can do  elective surgeries again and pursue removal of the bone spur from the patient's left hallux.This note was created using [employer] voice recognition   software that occasionally misinterpreted phrases or words.

## 2020-05-11 ENCOUNTER — TELEPHONE (OUTPATIENT)
Dept: PODIATRY | Facility: CLINIC | Age: 65
End: 2020-05-11

## 2020-05-11 NOTE — TELEPHONE ENCOUNTER
Patient advised of elective surgeries starting again. He is going to call/stop by the office to give a time he would like to schedule

## 2020-05-11 NOTE — TELEPHONE ENCOUNTER
----- Message from Nilay Cox DPM sent at 5/11/2020 12:46 PM CDT -----  Call and let him know elective sx has begun again whenever he is ready. TY

## 2020-06-11 ENCOUNTER — OFFICE VISIT (OUTPATIENT)
Dept: PODIATRY | Facility: CLINIC | Age: 65
End: 2020-06-11
Payer: MEDICARE

## 2020-06-11 VITALS
HEIGHT: 71 IN | BODY MASS INDEX: 34.86 KG/M2 | HEART RATE: 92 BPM | SYSTOLIC BLOOD PRESSURE: 143 MMHG | TEMPERATURE: 98 F | DIASTOLIC BLOOD PRESSURE: 84 MMHG | WEIGHT: 249 LBS

## 2020-06-11 DIAGNOSIS — M19.071 OSTEOARTHRITIS OF RIGHT ANKLE AND FOOT: ICD-10-CM

## 2020-06-11 DIAGNOSIS — L60.0 INGROWN NAIL: ICD-10-CM

## 2020-06-11 DIAGNOSIS — E11.9 TYPE 2 DIABETES MELLITUS WITHOUT COMPLICATION, WITHOUT LONG-TERM CURRENT USE OF INSULIN: Primary | ICD-10-CM

## 2020-06-11 DIAGNOSIS — L97.501 ULCER OF FOOT, LIMITED TO BREAKDOWN OF SKIN, UNSPECIFIED LATERALITY: ICD-10-CM

## 2020-06-11 PROCEDURE — 99214 OFFICE O/P EST MOD 30 MIN: CPT | Mod: PBBFAC,PN | Performed by: PODIATRIST

## 2020-06-11 PROCEDURE — 99999 PR PBB SHADOW E&M-EST. PATIENT-LVL IV: ICD-10-PCS | Mod: PBBFAC,,, | Performed by: PODIATRIST

## 2020-06-11 PROCEDURE — 99213 PR OFFICE/OUTPT VISIT, EST, LEVL III, 20-29 MIN: ICD-10-PCS | Mod: S$PBB,,, | Performed by: PODIATRIST

## 2020-06-11 PROCEDURE — 99999 PR PBB SHADOW E&M-EST. PATIENT-LVL IV: CPT | Mod: PBBFAC,,, | Performed by: PODIATRIST

## 2020-06-11 PROCEDURE — 99213 OFFICE O/P EST LOW 20 MIN: CPT | Mod: S$PBB,,, | Performed by: PODIATRIST

## 2020-06-11 NOTE — LETTER
June 14, 2020      Sonny Cerna III, MD  [address]  [place] [address]           [facility] - Podiatry/Wound Care  [facility] [facility] [place] [address]  Phone: [phone]  Fax: [phone]          Patient: Elmer Cunha   MR Number: [id number]   YOB: 1955   Date of Visit: 6/11/2020       Dear Dr. Sonny Cerna III:    Thank you for referring Elmer Cunha to me for evaluation. Attached you will find relevant portions of my assessment and plan of care.    If you have questions, please do not hesitate to call me. I look forward to following Elmer Cunha along with you.    Sincerely,    Nilay Cox, DPANNIKA    Enclosure  CC:  No Recipients    If you would like to receive this communication electronically, please contact [email or web address] or [phone] to request more information on [employer] Link access.    For providers and/or their staff who would like to refer a patient to [facility], please contact us through our one-stop-shop provider referral line, [facility], at [phone].    If you feel you have received this communication in error or would no longer like to receive these types of communications, please e-mail [email or web address]

## 2020-06-14 NOTE — PROGRESS NOTES
Subjective:       Patient ID: Elmer Cunha is a 64 y.o. male.    Chief Complaint:       Patient is also complaining about a painful ingrown toenail on his left great toe.  Follow-up  Associated symptoms include arthralgias and joint swelling.   Foot Pain  Associated symptoms include arthralgias and joint swelling.   Nail Problem  Associated symptoms include arthralgias and joint swelling.     Review of Systems   Musculoskeletal: Positive for arthralgias, gait problem and joint swelling.   All other systems reviewed and are negative.      Objective:      Physical Exam  Vitals signs and nursing note reviewed.   Constitutional:       Appearance: He is well-developed.   Cardiovascular:      Rate and Rhythm: Normal rate and regular rhythm.      Pulses:           Dorsalis pedis pulses are 1+ on the right side and 1+ on the left side.        Posterior tibial pulses are 1+ on the right side and 1+ on the left side.      Heart sounds: Normal heart sounds.   Pulmonary:      Effort: Pulmonary effort is normal.      Breath sounds: Normal breath sounds.   Musculoskeletal:         General: Tenderness present.      Right foot: Decreased range of motion.   Feet:      Right foot:      Protective Sensation: 4 sites tested. 4 sites sensed.      Skin integrity: Erythema and warmth present.      Left foot:      Protective Sensation: 4 sites tested. 4 sites sensed.   Skin:     General: Skin is warm.      Capillary Refill: Capillary refill takes 2 to 3 seconds.   Neurological:      Mental Status: He is alert.   Psychiatric:         Behavior: Behavior normal.         Thought Content: Thought content normal.         Judgment: Judgment normal.           Patient is noted to have an ingrowing toenail secondary to fungal involvement with early signs of infection on the patient's left hallux tenderness and discomfort is noted              Assessment:       1. Type 2 diabetes mellitus without complication, without long-term current use of  insulin    2. Osteoarthritis of right ankle and foot    3. Ingrown nail    4. Ulcer of foot, limited to breakdown of skin, unspecified laterality        Plan:                Patient presents today for follow-up diabetic evaluation he has a history of hyperkeratotic pre ulcerative lesions on the 5th digits bilateral he also has them on both big toes he states the left great toe has gotten extremely painful sore the tissues built up he says there is dry blood on the area and it has been excruciatingly painful and difficult for him to walk.  Complete diabetic evaluation performed today.  Patient does have an extensive history chronically ingrown toenails that have become infected previously he does have an ulcerative lesion secondary to degenerative arthritic spurring on the medial aspect of the patient's left hallux non excisional debridement of the area revealed healthy underlying skin and tissue there is some dry blood under the area no signs of infection noted there is however a palpable spur underneath this area.  Patient had several ingrown toenails today especially in the big toes that were removed I did non excisionally debride the 5th digits bilateral for a pre ulcerative area I have advised patient to monitor his feet closely he indicates he does want to pursue surgery to remove the bone spur on the left great toe.  Patient was contemplating surgery on his left big toe but he is having some other issues right now he states that he was told he is starting to build up some fluid in his head again this previously had to be drained he states he may need a shunt but certainly this is a bigger issue than his foot at this time and this needs to be monitored closely.  Complete diabetic evaluation performed.  Patient was in understanding and agreement with this follow-up 2 months patient advised to contact us with any problems questions or concerns prior to that time.  Total face-to-face time equaled 20 min.     Patient advised I will contact him when we can do elective surgeries again and pursue removal of the bone spur from the patient's left hallux.This note was created using [employer] voice recognition software that occasionally misinterpreted phrases or words.

## 2020-07-07 ENCOUNTER — OFFICE VISIT (OUTPATIENT)
Dept: PODIATRY | Facility: CLINIC | Age: 65
End: 2020-07-07
Payer: MEDICARE

## 2020-07-07 VITALS
BODY MASS INDEX: 36.4 KG/M2 | HEIGHT: 71 IN | WEIGHT: 260 LBS | TEMPERATURE: 98 F | SYSTOLIC BLOOD PRESSURE: 145 MMHG | HEART RATE: 91 BPM | DIASTOLIC BLOOD PRESSURE: 91 MMHG

## 2020-07-07 DIAGNOSIS — M19.071 OSTEOARTHRITIS OF RIGHT ANKLE AND FOOT: ICD-10-CM

## 2020-07-07 DIAGNOSIS — M15.9 PRIMARY OSTEOARTHRITIS INVOLVING MULTIPLE JOINTS: ICD-10-CM

## 2020-07-07 DIAGNOSIS — E11.9 TYPE 2 DIABETES MELLITUS WITHOUT COMPLICATION, WITHOUT LONG-TERM CURRENT USE OF INSULIN: Primary | ICD-10-CM

## 2020-07-07 DIAGNOSIS — L60.0 INGROWN NAIL: ICD-10-CM

## 2020-07-07 DIAGNOSIS — L97.501 ULCER OF FOOT, LIMITED TO BREAKDOWN OF SKIN, UNSPECIFIED LATERALITY: ICD-10-CM

## 2020-07-07 DIAGNOSIS — G91.9 HYDROCEPHALUS IN ADULT: ICD-10-CM

## 2020-07-07 PROCEDURE — 99999 PR PBB SHADOW E&M-EST. PATIENT-LVL IV: ICD-10-PCS | Mod: PBBFAC,,, | Performed by: PODIATRIST

## 2020-07-07 PROCEDURE — 99999 PR PBB SHADOW E&M-EST. PATIENT-LVL IV: CPT | Mod: PBBFAC,,, | Performed by: PODIATRIST

## 2020-07-07 PROCEDURE — 99213 PR OFFICE/OUTPT VISIT, EST, LEVL III, 20-29 MIN: ICD-10-PCS | Mod: S$PBB,,, | Performed by: PODIATRIST

## 2020-07-07 PROCEDURE — 99214 OFFICE O/P EST MOD 30 MIN: CPT | Mod: PBBFAC,PN | Performed by: PODIATRIST

## 2020-07-07 PROCEDURE — 99213 OFFICE O/P EST LOW 20 MIN: CPT | Mod: S$PBB,,, | Performed by: PODIATRIST

## 2020-07-07 NOTE — LETTER
July 11, 2020      Sonny Cerna III, MD  [address]  [place] [address]           [facility] - Podiatry/Wound Care  [facility] [place] [address]  Phone: [phone]  Fax: [phone]          Patient: Elmer Cunha   MR Number: [id number]   YOB: 1955   Date of Visit: 7/7/2020       Dear Dr. Sonny Cerna III:    Thank you for referring Elmer Cunha to me for evaluation. Attached you will find relevant portions of my assessment and plan of care.    If you have questions, please do not hesitate to call me. I look forward to following Elmer Cunha along with you.    Sincerely,    Nilay Cox, DPANNIKA    Enclosure  CC:  No Recipients    If you would like to receive this communication electronically, please contact [email or web address] or [phone] to request more information on [employer] Link access.    For providers and/or their staff who would like to refer a patient to [facility], please contact us through our one-stop-shop provider referral line, [facility], at [phone].    If you feel you have received this communication in error or would no longer like to receive these types of communications, please e-mail [email or web address]

## 2020-07-11 NOTE — PROGRESS NOTES
Subjective:       Patient ID: Elmer Cunha is a 64 y.o. male.    Chief Complaint:       Patient is also complaining about a painful ingrown toenail on his left great toe.  Follow-up  Associated symptoms include arthralgias and joint swelling.   Foot Pain  Associated symptoms include arthralgias and joint swelling.   Nail Problem  Associated symptoms include arthralgias and joint swelling.     Review of Systems   Musculoskeletal: Positive for arthralgias, gait problem and joint swelling.   All other systems reviewed and are negative.      Objective:      Physical Exam  Vitals signs and nursing note reviewed.   Constitutional:       Appearance: He is well-developed.   Cardiovascular:      Rate and Rhythm: Normal rate and regular rhythm.      Pulses:           Dorsalis pedis pulses are 1+ on the right side and 1+ on the left side.        Posterior tibial pulses are 1+ on the right side and 1+ on the left side.      Heart sounds: Normal heart sounds.   Pulmonary:      Effort: Pulmonary effort is normal.      Breath sounds: Normal breath sounds.   Musculoskeletal:         General: Tenderness present.      Right foot: Decreased range of motion.   Feet:      Right foot:      Protective Sensation: 4 sites tested. 4 sites sensed.      Skin integrity: Erythema and warmth present.      Left foot:      Protective Sensation: 4 sites tested. 4 sites sensed.   Skin:     General: Skin is warm.      Capillary Refill: Capillary refill takes 2 to 3 seconds.   Neurological:      Mental Status: He is alert.   Psychiatric:         Behavior: Behavior normal.         Thought Content: Thought content normal.         Judgment: Judgment normal.           Patient is noted to have an ingrowing toenail secondary to fungal involvement with early signs of infection on the patient's left hallux tenderness and discomfort is noted              Assessment:       1. Type 2 diabetes mellitus without complication, without long-term current use of  insulin    2. Ulcer of foot, limited to breakdown of skin, unspecified laterality    3. Ingrown nail    4. Hydrocephalus in adult    5. Osteoarthritis of right ankle and foot    6. Primary osteoarthritis involving multiple joints        Plan:                Patient presents today for follow-up diabetic evaluation he has a history of hyperkeratotic pre ulcerative lesions on the 5th digits bilateral he also has them on both big toes he states the left great toe has gotten extremely painful sore the tissues built up he says there is dry blood on the area and it has been excruciatingly painful and difficult for him to walk.  Complete diabetic evaluation performed today.  Patient does have an extensive history chronically ingrown toenails that have become infected previously he does have an ulcerative lesion secondary to degenerative arthritic spurring on the medial aspect of the patient's left hallux non excisional debridement of the area revealed healthy underlying skin and tissue there is some dry blood under the area no signs of infection noted there is however a palpable spur underneath this area.  Patient had several ingrown toenails today especially in the big toes that were removed I did non excisionally debride the 5th digits bilateral for a pre ulcerative area I have advised patient to monitor his feet closely he indicates he does want to pursue surgery to remove the bone spur on the left great toe.  Patient was contemplating surgery on his left big toe but he is having some other issues right now he states that he was told he is starting to build up some fluid in his head again this previously had to be drained he states he may need a shunt but certainly this is a bigger issue than his foot at this time and this needs to be monitored closely.  Patient states he is going to need shunt placement following discussion he was very emotional today understandably as he has developed fluid in his head he states that  he is awaiting a CT for further evaluation because of before the neurosurgeon can proceed with placement of a shunt.  Pre ulcerative area with bone spurring 5th digit left required non excisional debridement as did medial left hallux.  Complete diabetic evaluation performed.  Patient was in understanding and agreement with this follow-up 2 months patient advised to contact us with any problems questions or concerns prior to that time.  Total face-to-face time equaled 20 min.    This note was created using [employer] voice recognition software that occasionally misinterpreted phrases or words.

## 2020-07-30 ENCOUNTER — OFFICE VISIT (OUTPATIENT)
Dept: PODIATRY | Facility: CLINIC | Age: 65
End: 2020-07-30
Payer: MEDICARE

## 2020-07-30 VITALS
HEIGHT: 71 IN | SYSTOLIC BLOOD PRESSURE: 177 MMHG | DIASTOLIC BLOOD PRESSURE: 96 MMHG | HEART RATE: 99 BPM | WEIGHT: 260 LBS | BODY MASS INDEX: 36.4 KG/M2 | TEMPERATURE: 98 F

## 2020-07-30 DIAGNOSIS — M54.50 CHRONIC MIDLINE LOW BACK PAIN WITHOUT SCIATICA: ICD-10-CM

## 2020-07-30 DIAGNOSIS — E11.9 TYPE 2 DIABETES MELLITUS WITHOUT COMPLICATION, WITHOUT LONG-TERM CURRENT USE OF INSULIN: ICD-10-CM

## 2020-07-30 DIAGNOSIS — G89.29 CHRONIC MIDLINE LOW BACK PAIN WITHOUT SCIATICA: ICD-10-CM

## 2020-07-30 DIAGNOSIS — M19.079 OSTEOARTHRITIS OF ANKLE AND FOOT, UNSPECIFIED LATERALITY: Primary | ICD-10-CM

## 2020-07-30 DIAGNOSIS — L97.501 ULCER OF FOOT, LIMITED TO BREAKDOWN OF SKIN, UNSPECIFIED LATERALITY: ICD-10-CM

## 2020-07-30 PROCEDURE — 99214 OFFICE O/P EST MOD 30 MIN: CPT | Mod: PBBFAC,PN | Performed by: PODIATRIST

## 2020-07-30 PROCEDURE — 99999 PR PBB SHADOW E&M-EST. PATIENT-LVL IV: ICD-10-PCS | Mod: PBBFAC,,, | Performed by: PODIATRIST

## 2020-07-30 PROCEDURE — 99213 OFFICE O/P EST LOW 20 MIN: CPT | Mod: S$PBB,,, | Performed by: PODIATRIST

## 2020-07-30 PROCEDURE — 99213 PR OFFICE/OUTPT VISIT, EST, LEVL III, 20-29 MIN: ICD-10-PCS | Mod: S$PBB,,, | Performed by: PODIATRIST

## 2020-07-30 PROCEDURE — 99999 PR PBB SHADOW E&M-EST. PATIENT-LVL IV: CPT | Mod: PBBFAC,,, | Performed by: PODIATRIST

## 2020-07-30 NOTE — LETTER
August 2, 2020      Sonny Cerna III, MD  [address]  [place] [address]           [facility] - Podiatry/Wound Care  [facility] [place] [address]  Phone: [phone]  Fax: [phone]          Patient: Elmer Cunha   MR Number: [id number]   YOB: 1955   Date of Visit: 7/30/2020       Dear Dr. Sonny Cerna III:    Thank you for referring Elmer Cunha to me for evaluation. Attached you will find relevant portions of my assessment and plan of care.    If you have questions, please do not hesitate to call me. I look forward to following Elmer Cunha along with you.    Sincerely,    Nilay Cox, DPANNIKA    Enclosure  CC:  No Recipients    If you would like to receive this communication electronically, please contact [email or web address] or [phone] to request more information on [employer] Link access.    For providers and/or their staff who would like to refer a patient to [facility], please contact us through our one-stop-shop provider referral line, [facility], at [phone].    If you feel you have received this communication in error or would no longer like to receive these types of communications, please e-mail [email or web address]

## 2020-08-02 NOTE — PROGRESS NOTES
Subjective:       Patient ID: Elmer Cunha is a 64 y.o. male.    Chief Complaint:       Patient is also complaining about a painful ingrown toenail on his left great toe.  Foot Pain  Associated symptoms include arthralgias and joint swelling.   Follow-up  Associated symptoms include arthralgias and joint swelling.   Nail Problem  Associated symptoms include arthralgias and joint swelling.     Review of Systems   Musculoskeletal: Positive for arthralgias, gait problem and joint swelling.   All other systems reviewed and are negative.      Objective:      Physical Exam  Vitals signs and nursing note reviewed.   Constitutional:       Appearance: He is well-developed.   Cardiovascular:      Rate and Rhythm: Normal rate and regular rhythm.      Pulses:           Dorsalis pedis pulses are 1+ on the right side and 1+ on the left side.        Posterior tibial pulses are 1+ on the right side and 1+ on the left side.      Heart sounds: Normal heart sounds.   Pulmonary:      Effort: Pulmonary effort is normal.      Breath sounds: Normal breath sounds.   Musculoskeletal:         General: Tenderness present.      Right foot: Decreased range of motion.   Feet:      Right foot:      Protective Sensation: 4 sites tested. 4 sites sensed.      Skin integrity: Erythema and warmth present.      Left foot:      Protective Sensation: 4 sites tested. 4 sites sensed.   Skin:     General: Skin is warm.      Capillary Refill: Capillary refill takes 2 to 3 seconds.   Neurological:      Mental Status: He is alert.   Psychiatric:         Behavior: Behavior normal.         Thought Content: Thought content normal.         Judgment: Judgment normal.                                         Assessment:       1. Osteoarthritis of ankle and foot, unspecified laterality    2. Chronic midline low back pain without sciatica    3. Type 2 diabetes mellitus without complication, without long-term current use of insulin    4. Ulcer of foot, limited to  breakdown of skin, unspecified laterality        Plan:                Patient presents today for follow-up diabetic evaluation he has a history of hyperkeratotic pre ulcerative lesions on the 5th digits bilateral he also has them on both big toes he states the left great toe has gotten extremely painful sore the tissues built up he says there is dry blood on the area and it has been excruciatingly painful and difficult for him to walk.  Complete diabetic evaluation performed today.  Patient does have an extensive history chronically ingrown toenails that have become infected previously he does have an ulcerative lesion secondary to degenerative arthritic spurring on the medial aspect of the patient's left hallux non excisional debridement of the area revealed healthy underlying skin and tissue there is some dry blood under the area no signs of infection noted there is however a palpable spur underneath this area.  Patient had several ingrown toenails today especially in the big toes that were removed I did non excisionally debride the 5th digits bilateral for a pre ulcerative area I have advised patient to monitor his feet closely he indicates he does want to pursue surgery to remove the bone spur on the left great toe.  Patient indicated today that he got very good news he saw the neurosurgeon and in indicates that the fluid is slowly going down and his head and does not need any further surgical intervention at this time.  Pre ulcerative area with bone spurring 5th digit left required non excisional debridement as did medial left hallux.  Complete diabetic evaluation performed.  Patient was in understanding and agreement with this follow-up 2 months patient advised to contact us with any problems questions or concerns prior to that time.  Total face-to-face time equaled 20 min.    This note was created using [employer] voice recognition software that occasionally misinterpreted phrases or words.

## 2020-08-31 ENCOUNTER — HOSPITAL ENCOUNTER (OUTPATIENT)
Dept: RADIOLOGY | Facility: HOSPITAL | Age: 65
Discharge: HOME | End: 2020-08-31
Attending: NURSE PRACTITIONER
Payer: MEDICARE

## 2020-08-31 DIAGNOSIS — K59.00 CONSTIPATION, UNSPECIFIED CONSTIPATION TYPE: ICD-10-CM

## 2020-08-31 DIAGNOSIS — M25.521 PAIN AND SWELLING OF RIGHT ELBOW: ICD-10-CM

## 2020-08-31 DIAGNOSIS — M25.421 PAIN AND SWELLING OF RIGHT ELBOW: ICD-10-CM

## 2020-08-31 PROCEDURE — 73080 XR ELBOW COMPLETE 3 VIEW RIGHT: ICD-10-PCS | Mod: 26,RT,, | Performed by: RADIOLOGY

## 2020-08-31 PROCEDURE — 73080 X-RAY EXAM OF ELBOW: CPT | Mod: 26,RT,, | Performed by: RADIOLOGY

## 2020-08-31 PROCEDURE — 71046 X-RAY EXAM CHEST 2 VIEWS: CPT | Mod: 26,,, | Performed by: RADIOLOGY

## 2020-08-31 PROCEDURE — 74019 RADEX ABDOMEN 2 VIEWS: CPT | Mod: TC,PN

## 2020-08-31 PROCEDURE — 74019 RADEX ABDOMEN 2 VIEWS: CPT | Mod: 26,,, | Performed by: RADIOLOGY

## 2020-08-31 PROCEDURE — 71046 X-RAY EXAM CHEST 2 VIEWS: CPT | Mod: TC,PN

## 2020-08-31 PROCEDURE — 74019 XR ABDOMEN FLAT AND ERECT: ICD-10-PCS | Mod: 26,,, | Performed by: RADIOLOGY

## 2020-08-31 PROCEDURE — 73080 X-RAY EXAM OF ELBOW: CPT | Mod: TC,PN,RT

## 2020-08-31 PROCEDURE — 71046 XR CHEST PA AND LATERAL: ICD-10-PCS | Mod: 26,,, | Performed by: RADIOLOGY

## 2020-09-10 ENCOUNTER — HOSPITAL ENCOUNTER (OUTPATIENT)
Dept: RADIOLOGY | Facility: HOSPITAL | Age: 65
Discharge: HOME | End: 2020-09-10
Attending: NURSE PRACTITIONER
Payer: MEDICARE

## 2020-09-10 ENCOUNTER — OFFICE VISIT (OUTPATIENT)
Dept: PODIATRY | Facility: CLINIC | Age: 65
End: 2020-09-10
Payer: MEDICARE

## 2020-09-10 VITALS
DIASTOLIC BLOOD PRESSURE: 80 MMHG | TEMPERATURE: 98 F | BODY MASS INDEX: 36.12 KG/M2 | SYSTOLIC BLOOD PRESSURE: 132 MMHG | HEIGHT: 71 IN | WEIGHT: 258 LBS | HEART RATE: 83 BPM

## 2020-09-10 DIAGNOSIS — M25.521 PAIN AND SWELLING OF RIGHT ELBOW: ICD-10-CM

## 2020-09-10 DIAGNOSIS — E11.9 TYPE 2 DIABETES MELLITUS WITHOUT COMPLICATION, WITHOUT LONG-TERM CURRENT USE OF INSULIN: Primary | ICD-10-CM

## 2020-09-10 DIAGNOSIS — M19.079 OSTEOARTHRITIS OF ANKLE AND FOOT, UNSPECIFIED LATERALITY: ICD-10-CM

## 2020-09-10 DIAGNOSIS — M19.021 OSTEOARTHRITIS OF RIGHT ELBOW, UNSPECIFIED OSTEOARTHRITIS TYPE: ICD-10-CM

## 2020-09-10 DIAGNOSIS — M25.421 PAIN AND SWELLING OF RIGHT ELBOW: ICD-10-CM

## 2020-09-10 DIAGNOSIS — M20.42 HAMMER TOE OF LEFT FOOT: ICD-10-CM

## 2020-09-10 DIAGNOSIS — R17 TOTAL BILIRUBIN, ELEVATED: ICD-10-CM

## 2020-09-10 DIAGNOSIS — L97.501 ULCER OF FOOT, LIMITED TO BREAKDOWN OF SKIN, UNSPECIFIED LATERALITY: ICD-10-CM

## 2020-09-10 PROCEDURE — 99213 OFFICE O/P EST LOW 20 MIN: CPT | Mod: S$PBB,,, | Performed by: PODIATRIST

## 2020-09-10 PROCEDURE — 99215 OFFICE O/P EST HI 40 MIN: CPT | Mod: PBBFAC,25,PN | Performed by: PODIATRIST

## 2020-09-10 PROCEDURE — 76882 US LMTD JT/FCL EVL NVASC XTR: CPT | Mod: 26,RT,, | Performed by: RADIOLOGY

## 2020-09-10 PROCEDURE — 76700 US EXAM ABDOM COMPLETE: CPT | Mod: TC,PN

## 2020-09-10 PROCEDURE — 76700 US ABDOMEN COMPLETE: ICD-10-PCS | Mod: 26,,, | Performed by: RADIOLOGY

## 2020-09-10 PROCEDURE — 76882 US LMTD JT/FCL EVL NVASC XTR: CPT | Mod: TC,PN,RT

## 2020-09-10 PROCEDURE — 76882 US SOFT TISSUE, UPPER EXTREMITY, RIGHT: ICD-10-PCS | Mod: 26,RT,, | Performed by: RADIOLOGY

## 2020-09-10 PROCEDURE — 99213 PR OFFICE/OUTPT VISIT, EST, LEVL III, 20-29 MIN: ICD-10-PCS | Mod: S$PBB,,, | Performed by: PODIATRIST

## 2020-09-10 PROCEDURE — 99999 PR PBB SHADOW E&M-EST. PATIENT-LVL V: CPT | Mod: PBBFAC,,, | Performed by: PODIATRIST

## 2020-09-10 PROCEDURE — 76700 US EXAM ABDOM COMPLETE: CPT | Mod: 26,,, | Performed by: RADIOLOGY

## 2020-09-10 PROCEDURE — 99999 PR PBB SHADOW E&M-EST. PATIENT-LVL V: ICD-10-PCS | Mod: PBBFAC,,, | Performed by: PODIATRIST

## 2020-09-10 NOTE — LETTER
September 13, 2020      Sonny Cerna III, MD  [address]  [place] [address]           [facility] - Podiatry/Wound Care  [facility] [place] [address]  Phone: [phone]  Fax: [phone]          Patient: Elmer Cunha   MR Number: [id number]   YOB: 1955   Date of Visit: 9/10/2020       Dear Dr. Sonny Cerna III:    Thank you for referring Elmer Cunha to me for evaluation. Attached you will find relevant portions of my assessment and plan of care.    If you have questions, please do not hesitate to call me. I look forward to following Elmer Cunha along with you.    Sincerely,    Nilay Cox, DPANNIKA    Enclosure  CC:  No Recipients    If you would like to receive this communication electronically, please contact [email or web address] or [phone] to request more information on [employer] Link access.    For providers and/or their staff who would like to refer a patient to [facility], please contact us through our one-stop-shop provider referral line, [facility], at [phone].    If you feel you have received this communication in error or would no longer like to receive these types of communications, please e-mail [email or web address]

## 2020-09-13 NOTE — PROGRESS NOTES
Subjective:       Patient ID: Elmer Cunha is a 65 y.o. male.    Chief Complaint:       Patient is also complaining about a painful ingrown toenail on his left great toe.  Follow-up  Associated symptoms include arthralgias and joint swelling.   Foot Pain  Associated symptoms include arthralgias and joint swelling.   Nail Problem  Associated symptoms include arthralgias and joint swelling.     Review of Systems   Musculoskeletal: Positive for arthralgias, gait problem and joint swelling.   All other systems reviewed and are negative.      Objective:      Physical Exam  Vitals signs and nursing note reviewed.   Constitutional:       Appearance: He is well-developed.   Cardiovascular:      Rate and Rhythm: Normal rate and regular rhythm.      Pulses:           Dorsalis pedis pulses are 1+ on the right side and 1+ on the left side.        Posterior tibial pulses are 1+ on the right side and 1+ on the left side.      Heart sounds: Normal heart sounds.   Pulmonary:                      Effort: Pulmonary effort is normal.      Breath sounds: Normal breath sounds.   Musculoskeletal:         General: Tenderness present.      Right foot: Decreased range of motion.   Feet:      Right foot:      Protective Sensation: 4 sites tested. 4 sites sensed.      Skin integrity: Erythema and warmth present.      Left foot:      Protective Sensation: 4 sites tested. 4 sites sensed.   Skin:     General: Skin is warm.      Capillary Refill: Capillary refill takes 2 to 3 seconds.   Neurological:      Mental Status: He is alert.   Psychiatric:         Behavior: Behavior normal.         Thought Content: Thought content normal.         Judgment: Judgment normal.                                         Assessment:       1. Type 2 diabetes mellitus without complication, without long-term current use of insulin    2. Osteoarthritis of ankle and foot, unspecified laterality    3. Hammer toe of left foot    4. Ulcer of foot, limited to breakdown  of skin, unspecified laterality        Plan:                Patient presents today for follow-up diabetic evaluation he has a history of hyperkeratotic pre ulcerative lesions on the 5th digits bilateral he also has them on both big toes he states the left great toe has gotten extremely painful sore the tissues built up he says there is dry blood on the area and it has been excruciatingly painful and difficult for him to walk.  Complete diabetic evaluation performed today.  Patient does have an extensive history chronically ingrown toenails that have become infected previously he does have an ulcerative lesion secondary to degenerative arthritic spurring on the medial aspect of the patient's left hallux non excisional debridement of the area revealed healthy underlying skin and tissue there is some dry blood under the area no signs of infection noted there is however a palpable spur underneath this area.  Patient had several ingrown toenails today especially in the big toes that were removed I did non excisionally debride the 5th digits bilateral for a pre ulcerative area I have advised patient to monitor his feet closely he indicates he does want to pursue surgery to remove the bone spur on the left great toe.  Patient indicated today that he got very good news he saw the neurosurgeon and in indicates that the fluid is slowly going down and his head and does not need any further surgical intervention at this time.  Pre ulcerative area with bone spurring 5th digit left required non excisional debridement as did medial left hallux.  Complete diabetic evaluation performed.  Patient was in understanding and agreement with this follow-up 2 months patient advised to contact us with any problems questions or concerns prior to that time.  Patient indicated he scheduled to have eye surgery in November.  Total face-to-face time equaled 20 min.    This note was created using [employer] voice recognition software that occasionally  misinterpreted phrases or words.

## 2020-09-23 ENCOUNTER — TELEPHONE (OUTPATIENT)
Dept: SURGERY | Facility: CLINIC | Age: 65
End: 2020-09-23

## 2020-09-23 NOTE — TELEPHONE ENCOUNTER
Returned call. Spoke with patient. Encouraged to keep appointment with Dr. Her.  Patient stated understanding.     ----- Message from Keily Goddard sent at 9/23/2020  2:32 PM CDT -----  Type: Needs Medical Advice  Who Called:  Jignesh Espana (Daughter)  Best Call Back Number:   Additional Information: Calling to make sure Dr Powell would be able to see the patient for is issue. He was told to schedule with gastro for a mass on his liver but the soonest available was the end of  October.

## 2020-09-25 ENCOUNTER — TELEPHONE (OUTPATIENT)
Dept: PODIATRY | Facility: CLINIC | Age: 65
End: 2020-09-25

## 2020-09-25 NOTE — TELEPHONE ENCOUNTER
----- Message from Michelle Raya sent at 9/25/2020  8:57 AM CDT -----  Type:  Sooner Appointment Request    Caller is requesting a sooner appointment.      Name of Caller:  Elmer Cunha  When is the first available appointment?  Thursday, October 1st  Symptoms:  painful callus on little toe on left foot  Best Call Back Number:  [phone]  Additional Information:

## 2020-09-29 ENCOUNTER — OFFICE VISIT (OUTPATIENT)
Dept: PODIATRY | Facility: CLINIC | Age: 65
End: 2020-09-29
Payer: MEDICARE

## 2020-09-29 VITALS
HEIGHT: 71 IN | DIASTOLIC BLOOD PRESSURE: 78 MMHG | BODY MASS INDEX: 36.12 KG/M2 | SYSTOLIC BLOOD PRESSURE: 135 MMHG | WEIGHT: 258 LBS | HEART RATE: 83 BPM | TEMPERATURE: 83 F

## 2020-09-29 DIAGNOSIS — L97.501 ULCER OF FOOT, LIMITED TO BREAKDOWN OF SKIN, UNSPECIFIED LATERALITY: ICD-10-CM

## 2020-09-29 DIAGNOSIS — M20.42 HAMMER TOE OF LEFT FOOT: Primary | ICD-10-CM

## 2020-09-29 DIAGNOSIS — M20.21 HALLUX RIGIDUS OF RIGHT FOOT: ICD-10-CM

## 2020-09-29 DIAGNOSIS — M19.079 OSTEOARTHRITIS OF ANKLE AND FOOT, UNSPECIFIED LATERALITY: ICD-10-CM

## 2020-09-29 DIAGNOSIS — E11.9 TYPE 2 DIABETES MELLITUS WITHOUT COMPLICATION, WITHOUT LONG-TERM CURRENT USE OF INSULIN: ICD-10-CM

## 2020-09-29 DIAGNOSIS — M79.672 LEFT FOOT PAIN: ICD-10-CM

## 2020-09-29 DIAGNOSIS — L60.0 INGROWN NAIL: ICD-10-CM

## 2020-09-29 PROCEDURE — 99999 PR PBB SHADOW E&M-EST. PATIENT-LVL V: CPT | Mod: PBBFAC,,, | Performed by: PODIATRIST

## 2020-09-29 PROCEDURE — 99213 PR OFFICE/OUTPT VISIT, EST, LEVL III, 20-29 MIN: ICD-10-PCS | Mod: S$PBB,,, | Performed by: PODIATRIST

## 2020-09-29 PROCEDURE — 99999 PR PBB SHADOW E&M-EST. PATIENT-LVL V: ICD-10-PCS | Mod: PBBFAC,,, | Performed by: PODIATRIST

## 2020-09-29 PROCEDURE — 99213 OFFICE O/P EST LOW 20 MIN: CPT | Mod: S$PBB,,, | Performed by: PODIATRIST

## 2020-09-29 PROCEDURE — 99215 OFFICE O/P EST HI 40 MIN: CPT | Mod: PBBFAC,PN | Performed by: PODIATRIST

## 2020-09-29 NOTE — LETTER
October 3, 2020      Sonny Cerna III, MD  [address]  [place] [address]           [facility] - Podiatry/Wound Care  [facility] [facility] [place] [address]  Phone: [phone]  Fax: [phone]          Patient: Elmer Cunha   MR Number: [id number]   YOB: 1955   Date of Visit: 9/29/2020       Dear Dr. Sonny Cerna III:    Thank you for referring Elmer Cunha to me for evaluation. Attached you will find relevant portions of my assessment and plan of care.    If you have questions, please do not hesitate to call me. I look forward to following Elmer Cunha along with you.    Sincerely,    Nilay Cox, DPANNIKA    Enclosure  CC:  No Recipients    If you would like to receive this communication electronically, please contact [email or web address] or [phone] to request more information on [employer] Link access.    For providers and/or their staff who would like to refer a patient to [facility], please contact us through our one-stop-shop provider referral line, [facility], at [phone].    If you feel you have received this communication in error or would no longer like to receive these types of communications, please e-mail [email or web address]

## 2020-09-30 ENCOUNTER — TELEPHONE (OUTPATIENT)
Dept: GASTROENTEROLOGY | Facility: CLINIC | Age: 65
End: 2020-09-30

## 2020-09-30 NOTE — TELEPHONE ENCOUNTER
Pt appt r/s to sooner appt, verbalized appt date, time and location       ----- Message from Alla Newman Patient Care Assistant sent at 9/30/2020  9:32 AM CDT -----  Regarding: sooner appointment  Contact: patient  Type:  Sooner Apoointment Request    Caller is requesting a sooner appointment.  Caller declined first available appointment listed below.  Caller will not accept being placed on the waitlist and is requesting a message be sent to doctor.    Name of Caller:  patient  When is the first available appointment?  10/19    Best Call Back Number:  [phone] (home)     Additional Information:  patient states would like a sooner appointment . Please call for sooner appointment. Thanks.

## 2020-10-03 NOTE — PROGRESS NOTES
Subjective:       Patient ID: Elmer Cunha is a 65 y.o. male.    Chief Complaint:       Patient is also complaining about a painful ingrown toenail on his left great toe.  Follow-up  Associated symptoms include arthralgias and joint swelling.   Foot Pain  Associated symptoms include arthralgias and joint swelling.   Nail Problem  Associated symptoms include arthralgias and joint swelling.     Review of Systems   Musculoskeletal: Positive for arthralgias, gait problem and joint swelling.   All other systems reviewed and are negative.      Objective:      Physical Exam  Vitals signs and nursing note reviewed.   Constitutional:       Appearance: He is well-developed.   Cardiovascular:      Rate and Rhythm: Normal rate and regular rhythm.      Pulses:           Dorsalis pedis pulses are 1+ on the right side and 1+ on the left side.        Posterior tibial pulses are 1+ on the right side and 1+ on the left side.      Heart sounds: Normal heart sounds.   Pulmonary:      Effort: Pulmonary effort is normal.      Breath sounds: Normal breath sounds.   Musculoskeletal:         General: Tenderness present.      Right foot: Decreased range of motion.   Feet:      Right foot:      Protective Sensation: 4 sites tested. 4 sites sensed.      Skin integrity: Erythema and warmth present.      Left foot:      Protective Sensation: 4 sites tested. 4 sites sensed.   Skin:     General: Skin is warm.      Capillary Refill: Capillary refill takes 2 to 3 seconds.   Neurological:      Mental Status: He is alert.   Psychiatric:         Behavior: Behavior normal.         Thought Content: Thought content normal.         Judgment: Judgment normal.                                                         Assessment:       1. Hammer toe of left foot    2. Hallux rigidus of right foot    3. Left foot pain    4. Osteoarthritis of ankle and foot, unspecified laterality    5. Type 2 diabetes mellitus without complication, without long-term current  use of insulin    6. Ulcer of foot, limited to breakdown of skin, unspecified laterality    7. Ingrown nail        Plan:                Patient presents today for follow-up diabetic evaluation he has a history of hyperkeratotic pre ulcerative lesions on the 5th digits bilateral he also has them on both big toes he states the left great toe has gotten extremely painful sore the tissues built up he says there is dry blood on the area and it has been excruciatingly painful and difficult for him to walk.  Complete diabetic evaluation performed today.  Patient does have an extensive history chronically ingrown toenails that have become infected previously he does have an ulcerative lesion secondary to degenerative arthritic spurring on the medial aspect of the patient's left hallux non excisional debridement of the area revealed healthy underlying skin and tissue there is some dry blood under the area no signs of infection noted there is however a palpable spur underneath this area.  Patient had several ingrown toenails today especially in the big toes that were removed I did non excisionally debride the 5th digits bilateral for a pre ulcerative area I have advised patient to monitor his feet closely he indicates he does want to pursue surgery to remove the bone spur on the left great toe.  Patient indicated today that he got very good news he saw the neurosurgeon and in indicates that the fluid is slowly going down and his head and does not need any further surgical intervention at this time.  Pre ulcerative area with bone spurring 5th digit left required non excisional debridement as did medial left hallux.  Complete diabetic evaluation performed.  Patient was in understanding and agreement with this follow-up 2 months patient advised to contact us with any problems questions or concerns prior to that time.  Patient indicated he scheduled to have eye surgery in November.  Total face-to-face time equaled 20 min.   Patient states that studies indicated there is a spot of concern on his liver so he states he is going to be dealing with this and the treatment involved in treating this with additional testing.  This note was created using [employer] voice recognition software that occasionally misinterpreted phrases or words.

## 2020-10-06 ENCOUNTER — LAB VISIT (OUTPATIENT)
Dept: LAB | Facility: HOSPITAL | Age: 65
End: 2020-10-06
Attending: INTERNAL MEDICINE
Payer: MEDICARE

## 2020-10-06 ENCOUNTER — OFFICE VISIT (OUTPATIENT)
Dept: GASTROENTEROLOGY | Facility: CLINIC | Age: 65
End: 2020-10-06
Payer: MEDICARE

## 2020-10-06 VITALS
RESPIRATION RATE: 18 BRPM | WEIGHT: 248 LBS | OXYGEN SATURATION: 99 % | TEMPERATURE: 98 F | BODY MASS INDEX: 34.72 KG/M2 | HEIGHT: 71 IN | SYSTOLIC BLOOD PRESSURE: 117 MMHG | DIASTOLIC BLOOD PRESSURE: 72 MMHG | HEART RATE: 77 BPM

## 2020-10-06 DIAGNOSIS — K59.00 CONSTIPATION, UNSPECIFIED CONSTIPATION TYPE: ICD-10-CM

## 2020-10-06 DIAGNOSIS — K83.1 BILIARY OBSTRUCTION: Primary | ICD-10-CM

## 2020-10-06 DIAGNOSIS — R17 TOTAL BILIRUBIN, ELEVATED: ICD-10-CM

## 2020-10-06 DIAGNOSIS — R17 JAUNDICE: ICD-10-CM

## 2020-10-06 DIAGNOSIS — E66.9 OBESITY (BMI 30.0-34.9): ICD-10-CM

## 2020-10-06 DIAGNOSIS — Z90.49 HISTORY OF CHOLECYSTECTOMY: ICD-10-CM

## 2020-10-06 DIAGNOSIS — K76.0 FATTY LIVER: ICD-10-CM

## 2020-10-06 DIAGNOSIS — E65 OBESE ABDOMEN: ICD-10-CM

## 2020-10-06 DIAGNOSIS — K21.00 GASTROESOPHAGEAL REFLUX DISEASE WITH ESOPHAGITIS WITHOUT HEMORRHAGE: ICD-10-CM

## 2020-10-06 PROBLEM — E66.811 OBESITY (BMI 30.0-34.9): Status: ACTIVE | Noted: 2020-10-06

## 2020-10-06 LAB — IRON SERPL-MCNC: 109 UG/DL (ref 45–160)

## 2020-10-06 PROCEDURE — 99204 PR OFFICE/OUTPT VISIT, NEW, LEVL IV, 45-59 MIN: ICD-10-PCS | Mod: S$PBB,,, | Performed by: INTERNAL MEDICINE

## 2020-10-06 PROCEDURE — 80074 ACUTE HEPATITIS PANEL: CPT

## 2020-10-06 PROCEDURE — 83540 ASSAY OF IRON: CPT

## 2020-10-06 PROCEDURE — 99999 PR PBB SHADOW E&M-EST. PATIENT-LVL V: ICD-10-PCS | Mod: PBBFAC,,, | Performed by: INTERNAL MEDICINE

## 2020-10-06 PROCEDURE — 36415 COLL VENOUS BLD VENIPUNCTURE: CPT

## 2020-10-06 PROCEDURE — 82977 ASSAY OF GGT: CPT

## 2020-10-06 PROCEDURE — 99204 OFFICE O/P NEW MOD 45 MIN: CPT | Mod: S$PBB,,, | Performed by: INTERNAL MEDICINE

## 2020-10-06 PROCEDURE — 99999 PR PBB SHADOW E&M-EST. PATIENT-LVL V: CPT | Mod: PBBFAC,,, | Performed by: INTERNAL MEDICINE

## 2020-10-06 PROCEDURE — 99215 OFFICE O/P EST HI 40 MIN: CPT | Mod: PBBFAC,PN | Performed by: INTERNAL MEDICINE

## 2020-10-06 NOTE — PROGRESS NOTES
Subjective:       Patient ID: Elmer Cunha is a 65 y.o. male.    Chief Complaint: Fatty Liver and Constipation    CT scan a year ago revealed he had a cholecystectomy and a stable  cyst in the liver.  Fatty pancreas and a hiatal hernia.  There was also retained fecal material in the colon.  Recent ultrasound revealed that he had a fatty liver and also a cholecystectomy.  He has chronic constipation.  He has been on the MiraLax and the Linzess to produced daily bowel movements.  He does not associate constipation or bowel irregularity with these nausea..  In 2019 colonoscopy revealed a redundant left colon and sigmoid.  Upper endoscopy revealed gastritis.  A year ago his bilirubin was  elevated.  It is currently 1 and half times normal.  The alkaline phosphatase is increased over the last several months.  It is approximately 2 times normal.  He denies abdominal pain.  He denies jaundice.  He states when he was born that his mother was able to go home but he had to be hospitalized because of yellow jaundice .  He denies a history of liver disease.  His family history is positive for diabetes but is negative for gastrointestinal disease.  He denies fever chills.  He has been under lot of emotional stress because of the death of his wife.  He is very lonely and depressed.  He states he has used alcohol in the past but is not using alcohol currently.  He is having persistent nausea without vomiting.  He cannot pinpoint a a food that causes the nausea.      Allergies:  Review of patient's allergies indicates:   Allergen Reactions    Penicillin Rash    Pcn [penicillins]      Had ? Reaction as child       Medications:    Current Outpatient Medications:     albuterol-ipratropium (DUO-NEB) 2.5 mg-0.5 mg/3 mL nebulizer solution, Take 3 mLs by nebulization every 4 (four) hours as needed for Wheezing or Shortness of Breath. Rescue, Disp: 360 mL, Rfl: 0    amiodarone (CORDARONE) 200 MG Tab, Take 200 mg by mouth once  "daily., Disp: , Rfl:     amLODIPine (NORVASC) 5 MG tablet, Take 5 mg by mouth once daily. , Disp: , Rfl: 11    apixaban (ELIQUIS) 5 mg Tab, Take 5 mg by mouth 2 (two) times daily., Disp: , Rfl:     atorvastatin (LIPITOR) 40 MG tablet, Take 40 mg by mouth once daily., Disp: , Rfl:     escitalopram oxalate (LEXAPRO) 20 MG tablet, TAKE 1 TABLET BY MOUTH EACH DAY FOR MOOD "THANK YOU", Disp: 30 tablet, Rfl: 5    furosemide (LASIX) 20 MG tablet, , Disp: , Rfl: 0    latanoprost 0.005 % ophthalmic solution, , Disp: , Rfl:     linaCLOtide (LINZESS) 290 mcg Cap capsule, Take 1 capsule (290 mcg total) by mouth once daily., Disp: 90 capsule, Rfl: 2    meloxicam (MOBIC) 7.5 MG tablet, Take 1 tablet (7.5 mg total) by mouth daily as needed for Pain., Disp: 30 tablet, Rfl: 5    metoprolol tartrate (LOPRESSOR) 25 MG tablet, Take 25 mg by mouth. , Disp: , Rfl: 11    pantoprazole (PROTONIX) 40 MG tablet, TAKE 1 TABLET BY MOUTH EACH DAY "THANK YOU", Disp: 30 tablet, Rfl: 11    polyethylene glycol (CLEARLAX) 17 gram/dose powder, Take 17 g by mouth daily as needed (constipation)., Disp: 714 g, Rfl: 1    tamsulosin (FLOMAX) 0.4 mg Cap, TAKE 2 CAPSULES BY MOUTH EACH DAY FOR PROSTATE/URINARY "THANK YOU", Disp: 60 capsule, Rfl: 11    traMADoL (ULTRAM) 50 mg tablet, Take 1 tablet (50 mg total) by mouth every 6 (six) hours as needed for Pain., Disp: 20 tablet, Rfl: 0    TRUE METRIX GLUCOSE TEST STRIP Strp, TEST BLOOD GLUCOSE ONCE DAILY., Disp: 100 each, Rfl: 2    ULTRA THIN LANCETS 30 gauge Misc, USE 1 LANCET TO TEST BLOOD GLUCOSE ONCE DAILY, Disp: 100 each, Rfl: 2    Past Medical History:   Diagnosis Date    Arthritis     Atrial fibrillation     Cataract     Constipation     DDD (degenerative disc disease), lumbar     Depression     Diabetes mellitus, type 2     Fatty liver     GERD (gastroesophageal reflux disease)     Glaucoma     Hypercholesteremia     Hypertension     Stroke        Past Surgical History: " "  Procedure Laterality Date    ATRIAL CARDIAC PACEMAKER INSERTION      BRAIN SURGERY      CARDIAC CATHETERIZATION  10/25/2017    all arteries "patent"    CARDIAC PACEMAKER PLACEMENT  03/2017    CARDIAC PACEMAKER PLACEMENT      CHOLECYSTECTOMY      COLONOSCOPY  2013    Dr. Goldstein    COLONOSCOPY N/A 6/19/2019    Procedure: COLONOSCOPY;  Surgeon: Tyree Powell MD;  Location: [facility] ENDO;  Service: General;  Laterality: N/A;    ESOPHAGOGASTRODUODENOSCOPY N/A 6/19/2019    Procedure: ESOPHAGOGASTRODUODENOSCOPY (EGD);  Surgeon: Tyree Powell MD;  Location: [facility] ENDO;  Service: General;  Laterality: N/A;    EXCISION OF NAIL MATRIX Right 11/16/2018    Procedure: EXCISION, NAIL MATRIX;  Surgeon: Nilay Cox DPM;  Location: [facility] OR;  Service: Podiatry;  Laterality: Right;  nail avulsion    FOOT ARTHROPLASTY Right 11/16/2018    Procedure: ARTHROPLASTY, FOOT;  Surgeon: Nilay Cox DPM;  Location: [facility] OR;  Service: Podiatry;  Laterality: Right;  hallux rugidus  capsular repositioning         Review of Systems   Constitutional: Negative for appetite change, fever and unexpected weight change.   HENT: Negative for trouble swallowing.         No jaundice.   Respiratory: Negative for cough, shortness of breath and wheezing.         He has never been a cigarette smoker.  He denies dysphagia aspiration hemoptysis chronic cough chronic sputum production or dyspnea on exertion although he is not physically active.   Cardiovascular: Negative for chest pain.        He has a history of coronary artery disease and has a pacemaker in place.  He denies exertional chest pain or rhythm disturbance.  He states his hyperlipidemia and hypertension well controlled.  He has a history of atrial fibrillation.  He denies rhythm disturbance.  He sees Dr. Gomez his cardiologist.   Gastrointestinal: Positive for constipation and nausea. Negative for abdominal distention, abdominal pain, anal bleeding, blood in stool and " diarrhea.        Has history gastroesophageal reflux and is on the Protonix.   Endocrine:        He states he has been told he had pre diabetes.  He checks his glucose and is generally around 100-120.  He has never been told he had diabetes and is not on a diabetic diet.  He is taking no medications for diabetes.  The family history is positive for diabetes.   Musculoskeletal: Positive for arthralgias, back pain and gait problem. Negative for neck pain.        He is seen the podiatrist.  He is on the Mobic for the arthritis.  He does not believe it is causing side effects.  He ambulates with a cane is able perform his usual activities.   Skin: Negative for pallor and rash.   Neurological: Negative for dizziness, seizures, syncope, speech difficulty, weakness and numbness.   Hematological: Negative for adenopathy.   Psychiatric/Behavioral: Negative for confusion.       Objective:      Physical Exam  Vitals signs reviewed.   Constitutional:       Appearance: He is well-developed.      Comments: Well-nourished well-hydrated afebrile nonicteric overweight white male.  He is sitting comfortably in the chair.  He is breathing normally.  He is oriented x3.  He can relate his history and answer questions appropriately.  He is normocephalic.  Pupils are normal.  He is tearful when he talks about his  wife and family members.   HENT:      Head: Normocephalic.   Eyes:      Pupils: Pupils are equal, round, and reactive to light.   Neck:      Musculoskeletal: Normal range of motion and neck supple.      Thyroid: No thyromegaly.      Trachea: No tracheal deviation.   Cardiovascular:      Rate and Rhythm: Normal rate and regular rhythm.      Heart sounds: Normal heart sounds.   Pulmonary:      Effort: Pulmonary effort is normal.      Breath sounds: Normal breath sounds.   Abdominal:      General: Bowel sounds are normal. There is no distension.      Palpations: Abdomen is soft. There is no mass.      Tenderness: There is  no abdominal tenderness. There is no left CVA tenderness, guarding or rebound.      Hernia: No hernia is present.      Comments: His abdomen is obese without tenderness masses organomegaly.  Bowel sounds are normal.   Musculoskeletal:      Comments: He ambulates slowly with a cane.  He can go from the sitting the standing position.  He does need assistance getting on the exam table.   Lymphadenopathy:      Cervical: No cervical adenopathy.   Skin:     General: Skin is warm and dry.   Neurological:      Mental Status: He is alert and oriented to person, place, and time.      Cranial Nerves: No cranial nerve deficit.   Psychiatric:         Behavior: Behavior normal.           Plan:       Biliary obstruction  -     NM Hepatobiliary Scan (HIDA); Future; Expected date: 10/06/2020    Total bilirubin, elevated  -     Ambulatory referral/consult to Gastroenterology  -     Iron; Future; Expected date: 10/06/2020  -     Gamma GT; Future; Expected date: 10/06/2020  -     Hepatitis Panel, Acute; Future; Expected date: 10/06/2020    Fatty liver  -     Ambulatory referral/consult to Gastroenterology  -     Iron; Future; Expected date: 10/06/2020  -     Gamma GT; Future; Expected date: 10/06/2020  -     Hepatitis Panel, Acute; Future; Expected date: 10/06/2020    Constipation, unspecified constipation type  -     Ambulatory referral/consult to Gastroenterology  -     Iron; Future; Expected date: 10/06/2020  -     Gamma GT; Future; Expected date: 10/06/2020  -     Hepatitis Panel, Acute; Future; Expected date: 10/06/2020    Jaundice  -     Iron; Future; Expected date: 10/06/2020  -     Gamma GT; Future; Expected date: 10/06/2020  -     Hepatitis Panel, Acute; Future; Expected date: 10/06/2020    Gastroesophageal reflux disease with esophagitis without hemorrhage    History of cholecystectomy    Obesity (BMI 30.0-34.9)    Obese abdomen     I have encouraged weight reduction.  He will try to decrease his caloric intake.  He will  continue his reflux regimen current medications and bowel program.  He will add more fiber to the diet.  He has a pacemaker therefore a MRCP cannot be obtained.  He will have a nuclear medicine scan but may need to have an ERCP.  He is having persistent nausea but denies vomiting.  He will make a food diary and avoid the offending foods.  He continues his vitamins and minerals and was start vitamin-E.

## 2020-10-06 NOTE — PATIENT INSTRUCTIONS
He has an abnormal bilirubin out and phosphatase and nuclear medicine scan will be scheduled.  He will continue his MiraLax and Linzess to produced daily bowel movements.  He continues his other medications.  He states he is not a diabetic.  He is overweight and I have encouraged weight reduction.  He continues his other medications vitamins and minerals.

## 2020-10-06 NOTE — LETTER
October 6, 2020      Nanci Guillen, SHIRA-C  [address]  [place]  [place] [address]           [facility]  [address]  [place] [address]  Phone: [phone]  Fax: [phone]          Patient: Elmer Cunha   MR Number: [id number]   YOB: 1955   Date of Visit: 10/6/2020       Dear Nanci Guillen:    Thank you for referring Elmer Cunha to me for evaluation. Attached you will find relevant portions of my assessment and plan of care.    If you have questions, please do not hesitate to call me. I look forward to following Elmer Cunha along with you.    Sincerely,    Matthew Her MD    Enclosure  CC:  No Recipients    If you would like to receive this communication electronically, please contact [email or web address] or [phone] to request more information on [employer] access.    For providers and/or their staff who would like to refer a patient to [facility], please contact us through our one-stop-shop provider referral line, [facility], at [phone].    If you feel you have received this communication in error or would no longer like to receive these types of communications, please e-mail [email or web address]

## 2020-10-07 LAB — GGT SERPL-CCNC: 37 U/L (ref 8–55)

## 2020-10-08 LAB
HAV IGM SERPL QL IA: NEGATIVE
HBV CORE IGM SERPL QL IA: NEGATIVE
HBV SURFACE AG SERPL QL IA: NEGATIVE
HCV AB SERPL QL IA: NEGATIVE

## 2020-10-15 ENCOUNTER — HOSPITAL ENCOUNTER (OUTPATIENT)
Dept: RADIOLOGY | Facility: HOSPITAL | Age: 65
Discharge: HOME | End: 2020-10-15
Attending: INTERNAL MEDICINE
Payer: MEDICARE

## 2020-10-15 DIAGNOSIS — K83.1 BILIARY OBSTRUCTION: ICD-10-CM

## 2020-10-15 PROCEDURE — 78226 NM HEPATOBILIARY IMAGING INC GB (HIDA): ICD-10-PCS | Mod: 26,,, | Performed by: RADIOLOGY

## 2020-10-15 PROCEDURE — 78226 HEPATOBILIARY SYSTEM IMAGING: CPT | Mod: 26,,, | Performed by: RADIOLOGY

## 2020-10-15 PROCEDURE — A9537 TC99M MEBROFENIN: HCPCS

## 2020-10-21 ENCOUNTER — TELEPHONE (OUTPATIENT)
Dept: PODIATRY | Facility: CLINIC | Age: 65
End: 2020-10-21

## 2020-10-21 ENCOUNTER — HOSPITAL ENCOUNTER (OUTPATIENT)
Dept: RADIOLOGY | Facility: HOSPITAL | Age: 65
Discharge: HOME | End: 2020-10-21
Attending: CHIROPRACTOR
Payer: MEDICARE

## 2020-10-21 DIAGNOSIS — R17 TOTAL BILIRUBIN, ELEVATED: Primary | ICD-10-CM

## 2020-10-21 DIAGNOSIS — M54.9 DORSALGIA, UNSPECIFIED: ICD-10-CM

## 2020-10-21 DIAGNOSIS — M54.50 LOWER BACK PAIN: ICD-10-CM

## 2020-10-21 PROCEDURE — 72100 X-RAY EXAM L-S SPINE 2/3 VWS: CPT | Mod: TC,PN

## 2020-10-21 PROCEDURE — 72100 XR LUMBAR SPINE AP AND LATERAL: ICD-10-PCS | Mod: 26,,, | Performed by: RADIOLOGY

## 2020-10-21 PROCEDURE — 72100 X-RAY EXAM L-S SPINE 2/3 VWS: CPT | Mod: 26,,, | Performed by: RADIOLOGY

## 2020-10-21 NOTE — TELEPHONE ENCOUNTER
Appointment scheduled for 11/2/2020 @2:15 in the [place] location. Pt is aware it is the [facility] location.

## 2020-10-21 NOTE — TELEPHONE ENCOUNTER
----- Message from Layla Sandoval sent at 10/21/2020 10:16 AM CDT -----  Regarding: sooner  Contact: Patient/[phone] (home)  Type:  Sooner Apoointment Request    Caller is requesting a sooner appointment.  Caller declined first available appointment listed below.  Caller will not accept being placed on the waitlist and is requesting a message be sent to doctor.    Name of Caller:  Patient/[phone] (home)     When is the first available appointment?  11/5/20  Symptoms:  callous on feet and toes - burning like fire    Additional Information: Feels like he needs to get in today or tomorrow. Please call to advise.

## 2020-11-06 ENCOUNTER — OFFICE VISIT (OUTPATIENT)
Dept: GASTROENTEROLOGY | Facility: CLINIC | Age: 65
End: 2020-11-06
Payer: MEDICARE

## 2020-11-06 VITALS
HEIGHT: 71 IN | BODY MASS INDEX: 34.02 KG/M2 | RESPIRATION RATE: 18 BRPM | HEART RATE: 82 BPM | OXYGEN SATURATION: 96 % | DIASTOLIC BLOOD PRESSURE: 87 MMHG | TEMPERATURE: 98 F | SYSTOLIC BLOOD PRESSURE: 146 MMHG | WEIGHT: 243 LBS

## 2020-11-06 DIAGNOSIS — Z90.49 HISTORY OF CHOLECYSTECTOMY: ICD-10-CM

## 2020-11-06 DIAGNOSIS — E65 OBESE ABDOMEN: ICD-10-CM

## 2020-11-06 DIAGNOSIS — K21.00 GASTROESOPHAGEAL REFLUX DISEASE WITH ESOPHAGITIS WITHOUT HEMORRHAGE: ICD-10-CM

## 2020-11-06 DIAGNOSIS — R17 TOTAL BILIRUBIN, ELEVATED: ICD-10-CM

## 2020-11-06 DIAGNOSIS — K76.0 FATTY LIVER: Primary | ICD-10-CM

## 2020-11-06 PROCEDURE — 99999 PR PBB SHADOW E&M-EST. PATIENT-LVL V: ICD-10-PCS | Mod: PBBFAC,,, | Performed by: INTERNAL MEDICINE

## 2020-11-06 PROCEDURE — 99214 OFFICE O/P EST MOD 30 MIN: CPT | Mod: S$PBB,,, | Performed by: INTERNAL MEDICINE

## 2020-11-06 PROCEDURE — 99215 OFFICE O/P EST HI 40 MIN: CPT | Mod: PBBFAC,PN | Performed by: INTERNAL MEDICINE

## 2020-11-06 PROCEDURE — 99999 PR PBB SHADOW E&M-EST. PATIENT-LVL V: CPT | Mod: PBBFAC,,, | Performed by: INTERNAL MEDICINE

## 2020-11-06 PROCEDURE — 99214 PR OFFICE/OUTPT VISIT, EST, LEVL IV, 30-39 MIN: ICD-10-PCS | Mod: S$PBB,,, | Performed by: INTERNAL MEDICINE

## 2020-11-06 RX ORDER — ESCITALOPRAM OXALATE 10 MG/1
10 TABLET ORAL DAILY
Status: ON HOLD | COMMUNITY
Start: 2020-10-10 | End: 2021-07-27

## 2020-11-06 NOTE — PROGRESS NOTES
Subjective:       Patient ID: Elmer Cunha is a 65 y.o. male.    Chief Complaint: Follow-up    He has had a cholecystectomy.  He has a pacemaker.  He is followed by his cardiologist.  He denies cardiopulmonary symptoms.  Biliary scan was performed.  It shows that the bile system is 18.  He denies hematemesis hematochezia jaundice or bleeding.  He denies pyrosis and dyspepsia.  He generally has daily bowel movements.  He denies fever chills.  Last year the colonoscopy revealed a redundant colon but was otherwise normal.  Upper endoscopy revealed gastroesophageal reflux and hiatal hernia.  The Protonix has resolved his symptoms.  He denies dysphagia irradiation.      Allergies:  Review of patient's allergies indicates:   Allergen Reactions    Penicillin Rash    Pcn [penicillins]      Had ? Reaction as child       Medications:    Current Outpatient Medications:     albuterol-ipratropium (DUO-NEB) 2.5 mg-0.5 mg/3 mL nebulizer solution, Take 3 mLs by nebulization every 4 (four) hours as needed for Wheezing or Shortness of Breath. Rescue, Disp: 360 mL, Rfl: 0    amiodarone (CORDARONE) 200 MG Tab, Take 200 mg by mouth once daily., Disp: , Rfl:     amLODIPine (NORVASC) 5 MG tablet, Take 5 mg by mouth once daily. , Disp: , Rfl: 11    apixaban (ELIQUIS) 5 mg Tab, Take 5 mg by mouth 2 (two) times daily., Disp: , Rfl:     atorvastatin (LIPITOR) 40 MG tablet, Take 40 mg by mouth once daily., Disp: , Rfl:     escitalopram oxalate (LEXAPRO) 10 MG tablet, , Disp: , Rfl:     furosemide (LASIX) 20 MG tablet, , Disp: , Rfl: 0    latanoprost 0.005 % ophthalmic solution, , Disp: , Rfl:     linaCLOtide (LINZESS) 290 mcg Cap capsule, Take 1 capsule (290 mcg total) by mouth once daily., Disp: 90 capsule, Rfl: 2    meloxicam (MOBIC) 7.5 MG tablet, Take 1 tablet (7.5 mg total) by mouth daily as needed for Pain., Disp: 30 tablet, Rfl: 5    metoprolol tartrate (LOPRESSOR) 25 MG tablet, Take 25 mg by mouth. , Disp: , Rfl: 11     "pantoprazole (PROTONIX) 40 MG tablet, TAKE 1 TABLET BY MOUTH EACH DAY "THANK YOU", Disp: 30 tablet, Rfl: 11    polyethylene glycol (CLEARLAX) 17 gram/dose powder, Take 17 g by mouth daily as needed (constipation)., Disp: 714 g, Rfl: 1    tamsulosin (FLOMAX) 0.4 mg Cap, TAKE 2 CAPSULES BY MOUTH EACH DAY FOR PROSTATE/URINARY "THANK YOU", Disp: 60 capsule, Rfl: 11    traMADoL (ULTRAM) 50 mg tablet, Take 1 tablet (50 mg total) by mouth every 6 (six) hours as needed for Pain., Disp: 20 tablet, Rfl: 0    TRUE METRIX GLUCOSE TEST STRIP Strp, TEST BLOOD GLUCOSE ONCE DAILY., Disp: 100 each, Rfl: 2    ULTRA THIN LANCETS 30 gauge Misc, USE 1 LANCET TO TEST BLOOD GLUCOSE ONCE DAILY, Disp: 100 each, Rfl: 2    Past Medical History:   Diagnosis Date    Arthritis     Atrial fibrillation     Cataract     Constipation     DDD (degenerative disc disease), lumbar     Depression     Diabetes mellitus, type 2     Fatty liver     GERD (gastroesophageal reflux disease)     Glaucoma     Hypercholesteremia     Hypertension     Stroke        Past Surgical History:   Procedure Laterality Date    ATRIAL CARDIAC PACEMAKER INSERTION      BRAIN SURGERY      CARDIAC CATHETERIZATION  10/25/2017    all arteries "patent"    CARDIAC PACEMAKER PLACEMENT  03/2017    CARDIAC PACEMAKER PLACEMENT      CHOLECYSTECTOMY      COLONOSCOPY  2013    Dr. Goldstein    COLONOSCOPY N/A 6/19/2019    Procedure: COLONOSCOPY;  Surgeon: Tyree Powell MD;  Location: [facility] ENDO;  Service: General;  Laterality: N/A;    ESOPHAGOGASTRODUODENOSCOPY N/A 6/19/2019    Procedure: ESOPHAGOGASTRODUODENOSCOPY (EGD);  Surgeon: Tyree Powell MD;  Location: [facility] ENDO;  Service: General;  Laterality: N/A;    EXCISION OF NAIL MATRIX Right 11/16/2018    Procedure: EXCISION, NAIL MATRIX;  Surgeon: Nilay Cox DPM;  Location: [facility] OR;  Service: Podiatry;  Laterality: Right;  nail avulsion    FOOT ARTHROPLASTY Right 11/16/2018    Procedure: ARTHROPLASTY, " FOOT;  Surgeon: Nilay Cox DPM;  Location: [facility] OR;  Service: Podiatry;  Laterality: Right;  hallux rugidus  capsular repositioning         Review of Systems   Constitutional: Negative for appetite change, fever and unexpected weight change.   HENT: Negative for trouble swallowing.         He has had the right cataract removed in is scheduled for left cataract surgery.   Respiratory: Negative for cough, shortness of breath and wheezing.         He denies tobacco alcohol usage.  He denies dysphagia aspiration hemoptysis chronic cough chronic sputum production or dyspnea on exertion.  His chronic pain syndrome and arthritis is limited his physical activity.   Cardiovascular: Negative for chest pain.        He has a history of atrial fibrillation and coronary artery disease.  He denies exertional chest pain but is not particularly physically active.  He denies rhythm disturbance.  He denies cardiopulmonary symptoms a followed his cardiologist    Gastrointestinal: Negative for abdominal distention, abdominal pain, anal bleeding, blood in stool, constipation, diarrhea, nausea and rectal pain.        He denies has abdominal pain.  He generally has daily bowel movements.  He denies straining.  He has added more fiber to the diet..  He denies pyrosis or dyspepsia.  He is trying to reduce his weight.  He realizes he is overweight and has a fatty liver.  It has been difficult to reduce his weight but he has decreased his oral intake.  He is on his vitamins and minerals particularly the vitamin-E.  The ultrasound revealed a fatty liver.  He does have an elevated bilirubin and alkaline phosphatase.  The biliary scan was normal.   Endocrine:        He has decreased his caloric intake.  He states that his weight is stable at home.  He is trying to be more physically active but is difficult.  He is on the diabetic diet.  He takes his medications as prescribed.  He avoids the fatty foods.  He knows he has a fatty  liver.  He has tried reduce his weight.   Musculoskeletal: Positive for arthralgias and back pain. Negative for neck pain.        He is going to physical therapy for the back pain.   Skin: Negative for pallor and rash.   Neurological: Negative for dizziness, seizures, syncope, speech difficulty, weakness and numbness.   Hematological: Negative for adenopathy.   Psychiatric/Behavioral: Negative for confusion.       Objective:      Physical Exam  Vitals signs reviewed.   Constitutional:       Appearance: He is well-developed.      Comments: Well-nourished well-hydrated overweight nonicteric white male.  He is normocephalic.  Pupils are normal.  He is sitting comfortably in the chair.  He is breathing normally.  He appears to be oriented x3 and can relate his history and answer questions appropriately.   HENT:      Head: Normocephalic.   Eyes:      Pupils: Pupils are equal, round, and reactive to light.   Neck:      Musculoskeletal: Normal range of motion and neck supple.      Thyroid: No thyromegaly.      Trachea: No tracheal deviation.   Cardiovascular:      Rate and Rhythm: Normal rate and regular rhythm.      Heart sounds: Normal heart sounds.   Pulmonary:      Effort: Pulmonary effort is normal.      Breath sounds: Normal breath sounds.   Abdominal:      General: Bowel sounds are normal. There is no distension.      Palpations: Abdomen is soft. There is no mass.      Tenderness: There is no abdominal tenderness. There is no right CVA tenderness, left CVA tenderness, guarding or rebound.      Hernia: No hernia is present.      Comments: The abdomen is soft and obese without tenderness masses organomegaly.  Bowel sounds are normal.   Musculoskeletal: Normal range of motion.      Comments: He ambulates with a cane.  He is able to ambulate slowly.  He can go from the sitting the standing position without difficulty.   Lymphadenopathy:      Cervical: No cervical adenopathy.   Skin:     General: Skin is warm and dry.    Neurological:      Mental Status: He is alert and oriented to person, place, and time.      Cranial Nerves: No cranial nerve deficit.   Psychiatric:         Behavior: Behavior normal.           Plan:       Fatty liver  -     Comprehensive Metabolic Panel; Future; Expected date: 11/06/2020  -     CBC Auto Differential; Future; Expected date: 11/06/2020  -     Bilirubin, Total; Future; Expected date: 11/06/2020  -     BILIRUBIN, DIRECT; Future; Expected date: 11/06/2020    Total bilirubin, elevated  -     Comprehensive Metabolic Panel; Future; Expected date: 11/06/2020  -     CBC Auto Differential; Future; Expected date: 11/06/2020  -     Bilirubin, Total; Future; Expected date: 11/06/2020  -     BILIRUBIN, DIRECT; Future; Expected date: 11/06/2020    History of cholecystectomy    Gastroesophageal reflux disease with esophagitis without hemorrhage    Obese abdomen     he will continue his reflux regimen.  He continues his vitamins minerals.  He continues his bowel program with additional fiber.  He is trying to reduce his weight.  He has chronic back pain and is going to physical therapy.  He does not want further GI evaluation at this point because he is doing so well.  Further evaluation of the elevated bilirubin is in progress.  He wants to hold off on further GI evaluation such is ERCP because he is doing so well.  He wants to resolved the issue with the chronic back pain.

## 2020-11-06 NOTE — LETTER
November 6, 2020      Sonny Cerna III, MD  [address]  [place] [address]           [facility]  [facility]  [address]  [place] [address]  Phone: [phone]  Fax: [phone]          Patient: Elmer Cunha   MR Number: [id number]   YOB: 1955   Date of Visit: 11/6/2020       Dear Dr. Sonny Cerna III:    Thank you for referring Elmer Cunha to me for evaluation. Attached you will find relevant portions of my assessment and plan of care.    If you have questions, please do not hesitate to call me. I look forward to following Elmer Cunha along with you.    Sincerely,    Matthew Her MD    Enclosure  CC:  No Recipients    If you would like to receive this communication electronically, please contact [email or web address] or [phone] to request more information on [employer] Link access.    For providers and/or their staff who would like to refer a patient to [facility], please contact us through our one-stop-shop provider referral line, [facility] , at [phone].    If you feel you have received this communication in error or would no longer like to receive these types of communications, please e-mail [email or web address]

## 2020-11-06 NOTE — PATIENT INSTRUCTIONS
The biliary scan revealed he had a cholecystectomy but the bile duct appeared to be open.  He has seen the cardiologist and denies cardiac symptoms.  He has had his cataract surgery.  He has modified his diet and he has decreased his carbohydrates and avoid fatty foods.  He will continue physical therapy for the back pain.  Follow-up labs will be obtained.  He probably has a fatty liver and he will start the vitamin E.  Weight reduction would be ideal.  He will continue his reflux regimen current medications vitamins and minerals.

## 2020-11-10 ENCOUNTER — LAB VISIT (OUTPATIENT)
Dept: LAB | Facility: HOSPITAL | Age: 65
End: 2020-11-10
Attending: INTERNAL MEDICINE
Payer: MEDICARE

## 2020-11-10 DIAGNOSIS — R17 TOTAL BILIRUBIN, ELEVATED: ICD-10-CM

## 2020-11-10 DIAGNOSIS — K76.0 FATTY LIVER: ICD-10-CM

## 2020-11-10 LAB
ALBUMIN SERPL BCP-MCNC: 4.1 G/DL (ref 3.5–5.2)
ALP SERPL-CCNC: 163 U/L (ref 55–135)
ALT SERPL W/O P-5'-P-CCNC: 31 U/L (ref 10–44)
ANION GAP SERPL CALC-SCNC: 9 MMOL/L (ref 8–16)
AST SERPL-CCNC: 19 U/L (ref 10–40)
BASOPHILS # BLD AUTO: 0.03 K/UL (ref 0–0.2)
BASOPHILS NFR BLD: 0.3 % (ref 0–1.9)
BILIRUB DIRECT SERPL-MCNC: 0.2 MG/DL (ref 0.1–0.3)
BILIRUB SERPL-MCNC: 1.4 MG/DL (ref 0.1–1)
BILIRUB SERPL-MCNC: 1.4 MG/DL (ref 0.1–1)
BUN SERPL-MCNC: 15 MG/DL (ref 8–23)
CALCIUM SERPL-MCNC: 9.3 MG/DL (ref 8.7–10.5)
CHLORIDE SERPL-SCNC: 107 MMOL/L (ref 95–110)
CO2 SERPL-SCNC: 25 MMOL/L (ref 23–29)
CREAT SERPL-MCNC: 0.8 MG/DL (ref 0.5–1.4)
DIFFERENTIAL METHOD: ABNORMAL
EOSINOPHIL # BLD AUTO: 0.1 K/UL (ref 0–0.5)
EOSINOPHIL NFR BLD: 1.1 % (ref 0–8)
ERYTHROCYTE [DISTWIDTH] IN BLOOD BY AUTOMATED COUNT: 13.2 % (ref 11.5–14.5)
EST. GFR  (AFRICAN AMERICAN): >60 ML/MIN/1.73 M^2
EST. GFR  (NON AFRICAN AMERICAN): >60 ML/MIN/1.73 M^2
GLUCOSE SERPL-MCNC: 152 MG/DL (ref 70–110)
HCT VFR BLD AUTO: 42.9 % (ref 40–54)
HGB BLD-MCNC: 14.4 G/DL (ref 14–18)
IMM GRANULOCYTES # BLD AUTO: 0.02 K/UL (ref 0–0.04)
IMM GRANULOCYTES NFR BLD AUTO: 0.2 % (ref 0–0.5)
LYMPHOCYTES # BLD AUTO: 1.2 K/UL (ref 1–4.8)
LYMPHOCYTES NFR BLD: 13.1 % (ref 18–48)
MCH RBC QN AUTO: 29.1 PG (ref 27–31)
MCHC RBC AUTO-ENTMCNC: 33.6 G/DL (ref 32–36)
MCV RBC AUTO: 87 FL (ref 82–98)
MONOCYTES # BLD AUTO: 0.7 K/UL (ref 0.3–1)
MONOCYTES NFR BLD: 7.2 % (ref 4–15)
NEUTROPHILS # BLD AUTO: 7.3 K/UL (ref 1.8–7.7)
NEUTROPHILS NFR BLD: 78.1 % (ref 38–73)
NRBC BLD-RTO: 0 /100 WBC
PLATELET # BLD AUTO: 202 K/UL (ref 150–350)
PMV BLD AUTO: 10.6 FL (ref 9.2–12.9)
POTASSIUM SERPL-SCNC: 3.9 MMOL/L (ref 3.5–5.1)
PROT SERPL-MCNC: 6.7 G/DL (ref 6–8.4)
RBC # BLD AUTO: 4.94 M/UL (ref 4.6–6.2)
SODIUM SERPL-SCNC: 141 MMOL/L (ref 136–145)
WBC # BLD AUTO: 9.4 K/UL (ref 3.9–12.7)

## 2020-11-10 PROCEDURE — 85025 COMPLETE CBC W/AUTO DIFF WBC: CPT

## 2020-11-10 PROCEDURE — 82248 BILIRUBIN DIRECT: CPT

## 2020-11-10 PROCEDURE — 80053 COMPREHEN METABOLIC PANEL: CPT

## 2020-11-10 PROCEDURE — 36415 COLL VENOUS BLD VENIPUNCTURE: CPT

## 2020-11-12 ENCOUNTER — OFFICE VISIT (OUTPATIENT)
Dept: PODIATRY | Facility: CLINIC | Age: 65
End: 2020-11-12
Payer: MEDICARE

## 2020-11-12 VITALS
HEIGHT: 71 IN | SYSTOLIC BLOOD PRESSURE: 127 MMHG | TEMPERATURE: 98 F | DIASTOLIC BLOOD PRESSURE: 76 MMHG | HEART RATE: 68 BPM | WEIGHT: 235 LBS | BODY MASS INDEX: 32.9 KG/M2

## 2020-11-12 DIAGNOSIS — E11.9 TYPE 2 DIABETES MELLITUS WITHOUT COMPLICATION, WITHOUT LONG-TERM CURRENT USE OF INSULIN: ICD-10-CM

## 2020-11-12 DIAGNOSIS — M19.079 OSTEOARTHRITIS OF ANKLE AND FOOT, UNSPECIFIED LATERALITY: ICD-10-CM

## 2020-11-12 DIAGNOSIS — L97.501 ULCER OF FOOT, LIMITED TO BREAKDOWN OF SKIN, UNSPECIFIED LATERALITY: ICD-10-CM

## 2020-11-12 DIAGNOSIS — M20.42 HAMMER TOE OF LEFT FOOT: Primary | ICD-10-CM

## 2020-11-12 DIAGNOSIS — M79.672 LEFT FOOT PAIN: ICD-10-CM

## 2020-11-12 DIAGNOSIS — L60.0 INGROWN NAIL: ICD-10-CM

## 2020-11-12 PROCEDURE — 99999 PR PBB SHADOW E&M-EST. PATIENT-LVL IV: CPT | Mod: PBBFAC,,, | Performed by: PODIATRIST

## 2020-11-12 PROCEDURE — 99213 PR OFFICE/OUTPT VISIT, EST, LEVL III, 20-29 MIN: ICD-10-PCS | Mod: S$PBB,,, | Performed by: PODIATRIST

## 2020-11-12 PROCEDURE — 99214 OFFICE O/P EST MOD 30 MIN: CPT | Mod: PBBFAC,PN | Performed by: PODIATRIST

## 2020-11-12 PROCEDURE — 99999 PR PBB SHADOW E&M-EST. PATIENT-LVL IV: ICD-10-PCS | Mod: PBBFAC,,, | Performed by: PODIATRIST

## 2020-11-12 PROCEDURE — 99213 OFFICE O/P EST LOW 20 MIN: CPT | Mod: S$PBB,,, | Performed by: PODIATRIST

## 2020-11-12 NOTE — LETTER
November 13, 2020      Sonny Cerna III, MD  [address]  [place] [address]           [facility]  [address] [place] [address]  Phone: [phone]  Fax: [phone]          Patient: Elmer Cunha   MR Number: [id number]   YOB: 1955   Date of Visit: 11/12/2020       Dear Dr. Sonny Cerna III:    Thank you for referring Elmer Cunha to me for evaluation. Attached you will find relevant portions of my assessment and plan of care.    If you have questions, please do not hesitate to call me. I look forward to following Elmer Cunha along with you.    Sincerely,    Nilay Cox, DPANNIKA    Enclosure  CC:  No Recipients    If you would like to receive this communication electronically, please contact [email or web address] or [phone] to request more information on [employer] Link access.    For providers and/or their staff who would like to refer a patient to [facility], please contact us through our one-stop-shop provider referral line, [facility], at [phone].    If you feel you have received this communication in error or would no longer like to receive these types of communications, please e-mail [email or web address]

## 2020-11-13 ENCOUNTER — TELEPHONE (OUTPATIENT)
Dept: SURGERY | Facility: CLINIC | Age: 65
End: 2020-11-13

## 2020-11-13 NOTE — PROGRESS NOTES
Subjective:       Patient ID: Elmer Cunha is a 65 y.o. male.    Chief Complaint:       Patient is also complaining about a painful ingrown toenail on his left great toe.  Follow-up  Associated symptoms include arthralgias and joint swelling.   Foot Pain  Associated symptoms include arthralgias and joint swelling.   Nail Problem  Associated symptoms include arthralgias and joint swelling.     Review of Systems   Musculoskeletal: Positive for arthralgias, gait problem and joint swelling.   All other systems reviewed and are negative.      Objective:      Physical Exam  Vitals signs and nursing note reviewed.   Constitutional:       Appearance: He is well-developed.   Cardiovascular:      Rate and Rhythm: Normal rate and regular rhythm.      Pulses:           Dorsalis pedis pulses are 1+ on the right side and 1+ on the left side.        Posterior tibial pulses are 1+ on the right side and 1+ on the left side.      Heart sounds: Normal heart sounds.   Pulmonary:      Effort: Pulmonary effort is normal.      Breath sounds: Normal breath sounds.   Musculoskeletal:         General: Tenderness present.      Right foot: Decreased range of motion.   Feet:      Right foot:      Protective Sensation: 4 sites tested. 4 sites sensed.      Skin integrity: Erythema and warmth present.      Left foot:      Protective Sensation: 4 sites tested. 4 sites sensed.   Skin:     General: Skin is warm.      Capillary Refill: Capillary refill takes 2 to 3 seconds.                   Neurological:      Mental Status: He is alert.   Psychiatric:         Behavior: Behavior normal.         Thought Content: Thought content normal.         Judgment: Judgment normal.                                                         Assessment:       1. Hammer toe of left foot    2. Type 2 diabetes mellitus without complication, without long-term current use of insulin    3. Ulcer of foot, limited to breakdown of skin, unspecified laterality    4. Ingrown  nail    5. Left foot pain    6. Osteoarthritis of ankle and foot, unspecified laterality        Plan:                Patient presents today for follow-up diabetic evaluation he has a history of hyperkeratotic pre ulcerative lesions on the 5th digits bilateral he also has them on both big toes he states the left great toe has gotten extremely painful sore the tissues built up he says there is dry blood on the area and it has been excruciatingly painful and difficult for him to walk.  Complete diabetic evaluation performed today.  Patient does have an extensive history chronically ingrown toenails that have become infected previously he does have an ulcerative lesion secondary to degenerative arthritic spurring on the medial aspect of the patient's left hallux non excisional debridement of the area revealed healthy underlying skin and tissue there is some dry blood under the area no signs of infection noted there is however a palpable spur underneath this area.  Patient had several ingrown toenails today especially in the big toes that were removed I did non excisionally debride the 5th digits bilateral for a pre ulcerative area I have advised patient to monitor his feet closely he indicates he does want to pursue surgery to remove the bone spur on the left great toe.  Patient indicated today that he got very good news he saw the neurosurgeon and in indicates that the fluid is slowly going down and his head and does not need any further surgical intervention at this time.  Pre ulcerative area with bone spurring 5th digit left required non excisional debridement as did medial left hallux.  Complete diabetic evaluation performed.  Patient states he is scheduled to have his other cataract surgery performed he indicates following this he would like to address the bone spurring and digital contractures on the left foot.  Total face-to-face time equaled 20 min.    This note was created using [employer] voice recognition  software that occasionally misinterpreted phrases or words.

## 2020-11-13 NOTE — TELEPHONE ENCOUNTER
Called patient. Order placed and scheduled.     ----- Message from Matthew Her MD sent at 11/11/2020  7:13 AM CST -----  Tell him he has a benign elevation of bilirubin which explains why he had jaundice as a child.  The alkaline phosphatase is elevated.  Obtain a gamma GT

## 2020-11-16 ENCOUNTER — LAB VISIT (OUTPATIENT)
Dept: LAB | Facility: HOSPITAL | Age: 65
End: 2020-11-16
Attending: INTERNAL MEDICINE
Payer: MEDICARE

## 2020-11-16 DIAGNOSIS — R17 TOTAL BILIRUBIN, ELEVATED: ICD-10-CM

## 2020-11-16 PROCEDURE — 36415 COLL VENOUS BLD VENIPUNCTURE: CPT

## 2020-11-16 PROCEDURE — 82977 ASSAY OF GGT: CPT

## 2020-11-17 LAB — GGT SERPL-CCNC: 29 U/L (ref 8–55)

## 2020-11-19 ENCOUNTER — TELEPHONE (OUTPATIENT)
Dept: PODIATRY | Facility: CLINIC | Age: 65
End: 2020-11-19

## 2020-11-19 DIAGNOSIS — M20.21 HALLUX RIGIDUS OF RIGHT FOOT: ICD-10-CM

## 2020-11-19 DIAGNOSIS — M77.52 BONE SPUR OF TOE OF LEFT FOOT: ICD-10-CM

## 2020-11-19 DIAGNOSIS — M20.42 HAMMER TOE OF LEFT FOOT: Primary | ICD-10-CM

## 2020-11-19 NOTE — TELEPHONE ENCOUNTER
----- Message from Tamie Biswas sent at 11/19/2020  1:40 PM CST -----  Regarding: advice  Contact: pt  Patient called he is asking for a call back to set  up his surgery .  Patient states this was discussed on the last office visit.  Patient states he is in a lot of pain  Call back today   [phone]

## 2020-11-19 NOTE — TELEPHONE ENCOUNTER
Pre-op appointment scheduled for 12/03/2020 [place].    Post-op appointment scheduled for 12/8/2020 [place].    All reviewed with patient and understanding verbalized.

## 2020-11-29 DIAGNOSIS — M20.42 HAMMER TOE OF LEFT FOOT: Primary | ICD-10-CM

## 2020-11-30 ENCOUNTER — TELEPHONE (OUTPATIENT)
Dept: PODIATRY | Facility: CLINIC | Age: 65
End: 2020-11-30

## 2020-11-30 NOTE — TELEPHONE ENCOUNTER
LVM for patient of xray order and when it was scheduled, 12/1/2020 at 9am at the [facility]

## 2020-11-30 NOTE — TELEPHONE ENCOUNTER
----- Message from Nilay Cox DPM sent at 11/29/2020  3:44 PM CST -----  Call Pt and advise I will need him to get an xray of his left foot prior to his pre-op visit on Thursday.  Order is in.

## 2020-12-01 ENCOUNTER — HOSPITAL ENCOUNTER (OUTPATIENT)
Dept: RADIOLOGY | Facility: HOSPITAL | Age: 65
Discharge: HOME | End: 2020-12-01
Attending: PODIATRIST
Payer: MEDICARE

## 2020-12-01 DIAGNOSIS — M20.42 HAMMER TOE OF LEFT FOOT: ICD-10-CM

## 2020-12-01 PROCEDURE — 73630 XR FOOT COMPLETE 3 VIEW LEFT: ICD-10-PCS | Mod: 26,LT,, | Performed by: RADIOLOGY

## 2020-12-01 PROCEDURE — 73630 X-RAY EXAM OF FOOT: CPT | Mod: TC,PN,LT

## 2020-12-01 PROCEDURE — 73630 X-RAY EXAM OF FOOT: CPT | Mod: 26,LT,, | Performed by: RADIOLOGY

## 2020-12-02 ENCOUNTER — TELEPHONE (OUTPATIENT)
Dept: PODIATRY | Facility: CLINIC | Age: 65
End: 2020-12-02

## 2020-12-02 NOTE — TELEPHONE ENCOUNTER
Patient would like to cancel surgery. He is having another procedure done on his back next week that he wants to have done first.

## 2020-12-02 NOTE — TELEPHONE ENCOUNTER
----- Message from Keily Goddard sent at 12/2/2020  3:06 PM CST -----  Type: Needs Medical Advice  Who Called:  Patient  Best Call Back Number: [phone]  Additional Information: Calling to speak with the nurse about rescheduling his procedure as he has a different procedure that he has to get first.

## 2020-12-14 ENCOUNTER — TELEPHONE (OUTPATIENT)
Dept: PODIATRY | Facility: CLINIC | Age: 65
End: 2020-12-14

## 2020-12-14 NOTE — TELEPHONE ENCOUNTER
----- Message from Parveen Espana sent at 12/14/2020 12:41 PM CST -----  Regarding: Appoitment  Contact: Patient  Type:  Sooner Apoointment Request    Caller is requesting a sooner appointment.  Caller declined first available appointment listed below.  Caller will not accept being placed on the waitlist and is requesting a message be sent to doctor.    Name of Caller:  Patient   When is the first available appointment?  January 5th  Symptoms: callister on foot  Best Call Back Number:  [phone] (home)

## 2021-01-21 ENCOUNTER — OFFICE VISIT (OUTPATIENT)
Dept: PODIATRY | Facility: CLINIC | Age: 66
End: 2021-01-21
Payer: MEDICARE

## 2021-01-21 VITALS
BODY MASS INDEX: 33.18 KG/M2 | HEIGHT: 71 IN | WEIGHT: 237 LBS | SYSTOLIC BLOOD PRESSURE: 135 MMHG | TEMPERATURE: 98 F | HEART RATE: 97 BPM | DIASTOLIC BLOOD PRESSURE: 89 MMHG

## 2021-01-21 DIAGNOSIS — E11.9 TYPE 2 DIABETES MELLITUS WITHOUT COMPLICATION, WITHOUT LONG-TERM CURRENT USE OF INSULIN: ICD-10-CM

## 2021-01-21 DIAGNOSIS — M77.52 BONE SPUR OF TOE OF LEFT FOOT: ICD-10-CM

## 2021-01-21 DIAGNOSIS — L60.0 INGROWN NAIL: ICD-10-CM

## 2021-01-21 DIAGNOSIS — M19.079 OSTEOARTHRITIS OF ANKLE AND FOOT, UNSPECIFIED LATERALITY: ICD-10-CM

## 2021-01-21 DIAGNOSIS — R26.81 UNSTEADY GAIT: ICD-10-CM

## 2021-01-21 DIAGNOSIS — L97.501 ULCER OF FOOT, LIMITED TO BREAKDOWN OF SKIN, UNSPECIFIED LATERALITY: ICD-10-CM

## 2021-01-21 DIAGNOSIS — M20.42 HAMMER TOE OF LEFT FOOT: Primary | ICD-10-CM

## 2021-01-21 PROCEDURE — 99213 PR OFFICE/OUTPT VISIT, EST, LEVL III, 20-29 MIN: ICD-10-PCS | Mod: S$PBB,,, | Performed by: PODIATRIST

## 2021-01-21 PROCEDURE — 99213 OFFICE O/P EST LOW 20 MIN: CPT | Mod: S$PBB,,, | Performed by: PODIATRIST

## 2021-01-21 PROCEDURE — 99999 PR PBB SHADOW E&M-EST. PATIENT-LVL IV: ICD-10-PCS | Mod: PBBFAC,,, | Performed by: PODIATRIST

## 2021-01-21 PROCEDURE — 99999 PR PBB SHADOW E&M-EST. PATIENT-LVL IV: CPT | Mod: PBBFAC,,, | Performed by: PODIATRIST

## 2021-01-21 PROCEDURE — 99214 OFFICE O/P EST MOD 30 MIN: CPT | Mod: PBBFAC,PN | Performed by: PODIATRIST

## 2021-01-21 RX ORDER — HYDROCODONE BITARTRATE AND ACETAMINOPHEN 7.5; 325 MG/1; MG/1
TABLET ORAL
COMMUNITY
Start: 2020-12-08 | End: 2021-04-13

## 2021-02-23 ENCOUNTER — LAB VISIT (OUTPATIENT)
Dept: LAB | Facility: HOSPITAL | Age: 66
End: 2021-02-23
Attending: INTERNAL MEDICINE
Payer: MEDICARE

## 2021-02-23 DIAGNOSIS — E11.9 TYPE 2 DIABETES MELLITUS WITHOUT COMPLICATION, WITHOUT LONG-TERM CURRENT USE OF INSULIN: ICD-10-CM

## 2021-02-23 DIAGNOSIS — E78.00 HYPERCHOLESTEREMIA: ICD-10-CM

## 2021-02-23 DIAGNOSIS — Z79.899 ENCOUNTER FOR LONG-TERM CURRENT USE OF MEDICATION: ICD-10-CM

## 2021-02-23 DIAGNOSIS — Z12.5 ENCOUNTER FOR SCREENING FOR MALIGNANT NEOPLASM OF PROSTATE: ICD-10-CM

## 2021-02-23 DIAGNOSIS — I10 ESSENTIAL HYPERTENSION: ICD-10-CM

## 2021-02-23 DIAGNOSIS — R17 TOTAL BILIRUBIN, ELEVATED: ICD-10-CM

## 2021-02-23 DIAGNOSIS — M51.36 DDD (DEGENERATIVE DISC DISEASE), LUMBAR: ICD-10-CM

## 2021-02-23 LAB
ALBUMIN SERPL BCP-MCNC: 4.5 G/DL (ref 3.5–5.2)
ALP SERPL-CCNC: 154 U/L (ref 55–135)
ALT SERPL W/O P-5'-P-CCNC: 33 U/L (ref 10–44)
ANION GAP SERPL CALC-SCNC: 7 MMOL/L (ref 8–16)
AST SERPL-CCNC: 18 U/L (ref 10–40)
BASOPHILS # BLD AUTO: 0.02 K/UL (ref 0–0.2)
BASOPHILS NFR BLD: 0.3 % (ref 0–1.9)
BILIRUB SERPL-MCNC: 1.6 MG/DL (ref 0.1–1)
BUN SERPL-MCNC: 16 MG/DL (ref 8–23)
CALCIUM SERPL-MCNC: 9.9 MG/DL (ref 8.7–10.5)
CHLORIDE SERPL-SCNC: 106 MMOL/L (ref 95–110)
CHOLEST SERPL-MCNC: 176 MG/DL (ref 120–199)
CHOLEST/HDLC SERPL: 3.6 {RATIO} (ref 2–5)
CO2 SERPL-SCNC: 29 MMOL/L (ref 23–29)
COMPLEXED PSA SERPL-MCNC: 2.6 NG/ML (ref 0–4)
CREAT SERPL-MCNC: 0.7 MG/DL (ref 0.5–1.4)
DIFFERENTIAL METHOD: ABNORMAL
EOSINOPHIL # BLD AUTO: 0.1 K/UL (ref 0–0.5)
EOSINOPHIL NFR BLD: 1 % (ref 0–8)
ERYTHROCYTE [DISTWIDTH] IN BLOOD BY AUTOMATED COUNT: 13.2 % (ref 11.5–14.5)
EST. GFR  (AFRICAN AMERICAN): >60 ML/MIN/1.73 M^2
EST. GFR  (NON AFRICAN AMERICAN): >60 ML/MIN/1.73 M^2
ESTIMATED AVG GLUCOSE: 140 MG/DL (ref 68–131)
GLUCOSE SERPL-MCNC: 134 MG/DL (ref 70–110)
HBA1C MFR BLD: 6.5 % (ref 4.5–6.2)
HCT VFR BLD AUTO: 45.8 % (ref 40–54)
HDLC SERPL-MCNC: 49 MG/DL (ref 40–75)
HDLC SERPL: 27.8 % (ref 20–50)
HGB BLD-MCNC: 15.2 G/DL (ref 14–18)
IMM GRANULOCYTES # BLD AUTO: 0.02 K/UL (ref 0–0.04)
IMM GRANULOCYTES NFR BLD AUTO: 0.3 % (ref 0–0.5)
LDLC SERPL CALC-MCNC: 106.8 MG/DL (ref 63–159)
LYMPHOCYTES # BLD AUTO: 1.2 K/UL (ref 1–4.8)
LYMPHOCYTES NFR BLD: 17.8 % (ref 18–48)
MCH RBC QN AUTO: 29.1 PG (ref 27–31)
MCHC RBC AUTO-ENTMCNC: 33.2 G/DL (ref 32–36)
MCV RBC AUTO: 88 FL (ref 82–98)
MONOCYTES # BLD AUTO: 0.6 K/UL (ref 0.3–1)
MONOCYTES NFR BLD: 9.1 % (ref 4–15)
NEUTROPHILS # BLD AUTO: 4.9 K/UL (ref 1.8–7.7)
NEUTROPHILS NFR BLD: 71.5 % (ref 38–73)
NONHDLC SERPL-MCNC: 127 MG/DL
NRBC BLD-RTO: 0 /100 WBC
PLATELET # BLD AUTO: 215 K/UL (ref 150–350)
PMV BLD AUTO: 10.2 FL (ref 9.2–12.9)
POTASSIUM SERPL-SCNC: 4.3 MMOL/L (ref 3.5–5.1)
PROT SERPL-MCNC: 7 G/DL (ref 6–8.4)
RBC # BLD AUTO: 5.22 M/UL (ref 4.6–6.2)
SODIUM SERPL-SCNC: 142 MMOL/L (ref 136–145)
TRIGL SERPL-MCNC: 101 MG/DL (ref 30–150)
TSH SERPL DL<=0.005 MIU/L-ACNC: 1.89 UIU/ML (ref 0.34–5.6)
WBC # BLD AUTO: 6.9 K/UL (ref 3.9–12.7)

## 2021-02-23 PROCEDURE — 80061 LIPID PANEL: CPT

## 2021-02-23 PROCEDURE — 84153 ASSAY OF PSA TOTAL: CPT

## 2021-02-23 PROCEDURE — 84681 ASSAY OF C-PEPTIDE: CPT

## 2021-02-23 PROCEDURE — 83036 HEMOGLOBIN GLYCOSYLATED A1C: CPT

## 2021-02-23 PROCEDURE — 80053 COMPREHEN METABOLIC PANEL: CPT

## 2021-02-23 PROCEDURE — 36415 COLL VENOUS BLD VENIPUNCTURE: CPT

## 2021-02-23 PROCEDURE — 84443 ASSAY THYROID STIM HORMONE: CPT

## 2021-02-23 PROCEDURE — 85025 COMPLETE CBC W/AUTO DIFF WBC: CPT

## 2021-02-24 LAB — C PEPTIDE SERPL-MCNC: 3.53 NG/ML (ref 0.78–5.19)

## 2021-02-25 ENCOUNTER — OFFICE VISIT (OUTPATIENT)
Dept: PODIATRY | Facility: CLINIC | Age: 66
End: 2021-02-25
Payer: MEDICARE

## 2021-02-25 VITALS
WEIGHT: 237 LBS | DIASTOLIC BLOOD PRESSURE: 93 MMHG | BODY MASS INDEX: 33.18 KG/M2 | TEMPERATURE: 98 F | SYSTOLIC BLOOD PRESSURE: 139 MMHG | HEART RATE: 78 BPM | HEIGHT: 71 IN

## 2021-02-25 DIAGNOSIS — L60.0 INGROWN NAIL: ICD-10-CM

## 2021-02-25 DIAGNOSIS — E11.9 TYPE 2 DIABETES MELLITUS WITHOUT COMPLICATION, WITHOUT LONG-TERM CURRENT USE OF INSULIN: ICD-10-CM

## 2021-02-25 DIAGNOSIS — L97.511 ULCER OF BOTH FEET, LIMITED TO BREAKDOWN OF SKIN: ICD-10-CM

## 2021-02-25 DIAGNOSIS — B35.3 TINEA PEDIS OF BOTH FEET: ICD-10-CM

## 2021-02-25 DIAGNOSIS — R26.81 UNSTEADY GAIT: ICD-10-CM

## 2021-02-25 DIAGNOSIS — M19.079 OSTEOARTHRITIS OF ANKLE AND FOOT, UNSPECIFIED LATERALITY: ICD-10-CM

## 2021-02-25 DIAGNOSIS — M79.672 LEFT FOOT PAIN: ICD-10-CM

## 2021-02-25 DIAGNOSIS — M77.52 BONE SPUR OF TOE OF LEFT FOOT: Primary | ICD-10-CM

## 2021-02-25 DIAGNOSIS — L97.521 ULCER OF BOTH FEET, LIMITED TO BREAKDOWN OF SKIN: ICD-10-CM

## 2021-02-25 PROBLEM — M20.21 HALLUX RIGIDUS OF RIGHT FOOT: Status: RESOLVED | Noted: 2018-10-23 | Resolved: 2021-02-25

## 2021-02-25 PROCEDURE — 99215 OFFICE O/P EST HI 40 MIN: CPT | Mod: PBBFAC,PN | Performed by: PODIATRIST

## 2021-02-25 PROCEDURE — 99999 PR PBB SHADOW E&M-EST. PATIENT-LVL V: CPT | Mod: PBBFAC,,, | Performed by: PODIATRIST

## 2021-02-25 PROCEDURE — 99213 PR OFFICE/OUTPT VISIT, EST, LEVL III, 20-29 MIN: ICD-10-PCS | Mod: S$PBB,,, | Performed by: PODIATRIST

## 2021-02-25 PROCEDURE — 99213 OFFICE O/P EST LOW 20 MIN: CPT | Mod: S$PBB,,, | Performed by: PODIATRIST

## 2021-02-25 PROCEDURE — 99999 PR PBB SHADOW E&M-EST. PATIENT-LVL V: ICD-10-PCS | Mod: PBBFAC,,, | Performed by: PODIATRIST

## 2021-03-15 ENCOUNTER — TELEPHONE (OUTPATIENT)
Dept: PODIATRY | Facility: CLINIC | Age: 66
End: 2021-03-15

## 2021-03-18 ENCOUNTER — OFFICE VISIT (OUTPATIENT)
Dept: PODIATRY | Facility: CLINIC | Age: 66
End: 2021-03-18
Payer: MEDICARE

## 2021-03-18 VITALS
WEIGHT: 234 LBS | BODY MASS INDEX: 32.76 KG/M2 | TEMPERATURE: 98 F | SYSTOLIC BLOOD PRESSURE: 130 MMHG | HEIGHT: 71 IN | HEART RATE: 93 BPM | DIASTOLIC BLOOD PRESSURE: 79 MMHG

## 2021-03-18 DIAGNOSIS — L97.521 ULCER OF BOTH FEET, LIMITED TO BREAKDOWN OF SKIN: ICD-10-CM

## 2021-03-18 DIAGNOSIS — M20.42 HAMMER TOE OF LEFT FOOT: ICD-10-CM

## 2021-03-18 DIAGNOSIS — E11.9 TYPE 2 DIABETES MELLITUS WITHOUT COMPLICATION, WITHOUT LONG-TERM CURRENT USE OF INSULIN: Primary | ICD-10-CM

## 2021-03-18 DIAGNOSIS — L97.511 ULCER OF BOTH FEET, LIMITED TO BREAKDOWN OF SKIN: ICD-10-CM

## 2021-03-18 DIAGNOSIS — M19.079 OSTEOARTHRITIS OF ANKLE AND FOOT, UNSPECIFIED LATERALITY: ICD-10-CM

## 2021-03-18 DIAGNOSIS — M77.52 BONE SPUR OF TOE OF LEFT FOOT: ICD-10-CM

## 2021-03-18 DIAGNOSIS — R26.81 UNSTEADY GAIT: ICD-10-CM

## 2021-03-18 PROCEDURE — 99214 PR OFFICE/OUTPT VISIT, EST, LEVL IV, 30-39 MIN: ICD-10-PCS | Mod: S$PBB,,, | Performed by: PODIATRIST

## 2021-03-18 PROCEDURE — 99999 PR PBB SHADOW E&M-EST. PATIENT-LVL IV: CPT | Mod: PBBFAC,,, | Performed by: PODIATRIST

## 2021-03-18 PROCEDURE — 99999 PR PBB SHADOW E&M-EST. PATIENT-LVL IV: ICD-10-PCS | Mod: PBBFAC,,, | Performed by: PODIATRIST

## 2021-03-18 PROCEDURE — 99214 OFFICE O/P EST MOD 30 MIN: CPT | Mod: S$PBB,,, | Performed by: PODIATRIST

## 2021-03-18 PROCEDURE — 99214 OFFICE O/P EST MOD 30 MIN: CPT | Mod: PBBFAC,PN | Performed by: PODIATRIST

## 2021-04-08 ENCOUNTER — TELEPHONE (OUTPATIENT)
Dept: PODIATRY | Facility: CLINIC | Age: 66
End: 2021-04-08

## 2021-04-12 ENCOUNTER — OFFICE VISIT (OUTPATIENT)
Dept: PODIATRY | Facility: CLINIC | Age: 66
End: 2021-04-12
Payer: MEDICARE

## 2021-04-12 VITALS
RESPIRATION RATE: 18 BRPM | SYSTOLIC BLOOD PRESSURE: 133 MMHG | HEIGHT: 71 IN | WEIGHT: 236 LBS | HEART RATE: 86 BPM | BODY MASS INDEX: 33.04 KG/M2 | DIASTOLIC BLOOD PRESSURE: 84 MMHG | TEMPERATURE: 98 F

## 2021-04-12 DIAGNOSIS — M20.42 HAMMER TOE OF LEFT FOOT: ICD-10-CM

## 2021-04-12 DIAGNOSIS — E11.9 TYPE 2 DIABETES MELLITUS WITHOUT COMPLICATION, WITHOUT LONG-TERM CURRENT USE OF INSULIN: ICD-10-CM

## 2021-04-12 DIAGNOSIS — L97.511 ULCER OF BOTH FEET, LIMITED TO BREAKDOWN OF SKIN: Primary | ICD-10-CM

## 2021-04-12 DIAGNOSIS — L97.521 ULCER OF BOTH FEET, LIMITED TO BREAKDOWN OF SKIN: Primary | ICD-10-CM

## 2021-04-12 DIAGNOSIS — M19.079 OSTEOARTHRITIS OF ANKLE AND FOOT, UNSPECIFIED LATERALITY: ICD-10-CM

## 2021-04-12 PROCEDURE — 99999 PR PBB SHADOW E&M-EST. PATIENT-LVL V: CPT | Mod: PBBFAC,,, | Performed by: PODIATRIST

## 2021-04-12 PROCEDURE — 99215 OFFICE O/P EST HI 40 MIN: CPT | Mod: PBBFAC | Performed by: PODIATRIST

## 2021-04-12 PROCEDURE — 99213 PR OFFICE/OUTPT VISIT, EST, LEVL III, 20-29 MIN: ICD-10-PCS | Mod: S$PBB,,, | Performed by: PODIATRIST

## 2021-04-12 PROCEDURE — 99213 OFFICE O/P EST LOW 20 MIN: CPT | Mod: S$PBB,,, | Performed by: PODIATRIST

## 2021-04-12 PROCEDURE — 99999 PR PBB SHADOW E&M-EST. PATIENT-LVL V: ICD-10-PCS | Mod: PBBFAC,,, | Performed by: PODIATRIST

## 2021-05-18 ENCOUNTER — OFFICE VISIT (OUTPATIENT)
Dept: PODIATRY | Facility: CLINIC | Age: 66
End: 2021-05-18
Payer: MEDICARE

## 2021-05-18 VITALS
DIASTOLIC BLOOD PRESSURE: 86 MMHG | HEIGHT: 71 IN | BODY MASS INDEX: 33.04 KG/M2 | WEIGHT: 236 LBS | TEMPERATURE: 98 F | SYSTOLIC BLOOD PRESSURE: 147 MMHG | HEART RATE: 95 BPM

## 2021-05-18 DIAGNOSIS — L60.0 INGROWN NAIL: ICD-10-CM

## 2021-05-18 DIAGNOSIS — M15.9 PRIMARY OSTEOARTHRITIS INVOLVING MULTIPLE JOINTS: ICD-10-CM

## 2021-05-18 DIAGNOSIS — L97.511 ULCER OF BOTH FEET, LIMITED TO BREAKDOWN OF SKIN: ICD-10-CM

## 2021-05-18 DIAGNOSIS — M20.42 HAMMER TOE OF LEFT FOOT: Primary | ICD-10-CM

## 2021-05-18 DIAGNOSIS — L97.521 ULCER OF BOTH FEET, LIMITED TO BREAKDOWN OF SKIN: ICD-10-CM

## 2021-05-18 DIAGNOSIS — E11.9 TYPE 2 DIABETES MELLITUS WITHOUT COMPLICATION, WITHOUT LONG-TERM CURRENT USE OF INSULIN: ICD-10-CM

## 2021-05-18 DIAGNOSIS — M19.079 OSTEOARTHRITIS OF ANKLE AND FOOT, UNSPECIFIED LATERALITY: ICD-10-CM

## 2021-05-18 DIAGNOSIS — R26.81 UNSTEADY GAIT: ICD-10-CM

## 2021-05-18 PROCEDURE — 99213 PR OFFICE/OUTPT VISIT, EST, LEVL III, 20-29 MIN: ICD-10-PCS | Mod: S$PBB,,, | Performed by: PODIATRIST

## 2021-05-18 PROCEDURE — 99213 OFFICE O/P EST LOW 20 MIN: CPT | Mod: S$PBB,,, | Performed by: PODIATRIST

## 2021-05-18 PROCEDURE — 99999 PR PBB SHADOW E&M-EST. PATIENT-LVL IV: ICD-10-PCS | Mod: PBBFAC,,, | Performed by: PODIATRIST

## 2021-05-18 PROCEDURE — 99214 OFFICE O/P EST MOD 30 MIN: CPT | Mod: PBBFAC,PN | Performed by: PODIATRIST

## 2021-05-18 PROCEDURE — 99999 PR PBB SHADOW E&M-EST. PATIENT-LVL IV: CPT | Mod: PBBFAC,,, | Performed by: PODIATRIST

## 2021-06-28 ENCOUNTER — TELEPHONE (OUTPATIENT)
Dept: PODIATRY | Facility: CLINIC | Age: 66
End: 2021-06-28

## 2021-07-08 ENCOUNTER — OFFICE VISIT (OUTPATIENT)
Dept: PODIATRY | Facility: CLINIC | Age: 66
End: 2021-07-08
Payer: MEDICARE

## 2021-07-08 ENCOUNTER — HOSPITAL ENCOUNTER (OUTPATIENT)
Dept: RADIOLOGY | Facility: HOSPITAL | Age: 66
Discharge: HOME | End: 2021-07-08
Attending: PODIATRIST
Payer: MEDICARE

## 2021-07-08 VITALS
HEIGHT: 71 IN | RESPIRATION RATE: 18 BRPM | DIASTOLIC BLOOD PRESSURE: 88 MMHG | HEART RATE: 97 BPM | BODY MASS INDEX: 32.48 KG/M2 | SYSTOLIC BLOOD PRESSURE: 141 MMHG | WEIGHT: 232 LBS

## 2021-07-08 DIAGNOSIS — E11.9 COMPREHENSIVE DIABETIC FOOT EXAMINATION, TYPE 2 DM, ENCOUNTER FOR: ICD-10-CM

## 2021-07-08 DIAGNOSIS — M77.52 BONE SPUR OF TOE OF LEFT FOOT: Primary | ICD-10-CM

## 2021-07-08 DIAGNOSIS — M77.52 BONE SPUR OF TOE OF LEFT FOOT: ICD-10-CM

## 2021-07-08 DIAGNOSIS — M20.42 HAMMER TOE OF LEFT FOOT: ICD-10-CM

## 2021-07-08 DIAGNOSIS — E11.9 TYPE 2 DIABETES MELLITUS WITHOUT COMPLICATION, WITHOUT LONG-TERM CURRENT USE OF INSULIN: ICD-10-CM

## 2021-07-08 PROCEDURE — 73630 X-RAY EXAM OF FOOT: CPT | Mod: TC,PN,LT

## 2021-07-08 PROCEDURE — 99999 PR PBB SHADOW E&M-EST. PATIENT-LVL V: ICD-10-PCS | Mod: PBBFAC,,, | Performed by: PODIATRIST

## 2021-07-08 PROCEDURE — 99999 PR PBB SHADOW E&M-EST. PATIENT-LVL V: CPT | Mod: PBBFAC,,, | Performed by: PODIATRIST

## 2021-07-08 PROCEDURE — 99214 OFFICE O/P EST MOD 30 MIN: CPT | Mod: S$PBB,,, | Performed by: PODIATRIST

## 2021-07-08 PROCEDURE — 99215 OFFICE O/P EST HI 40 MIN: CPT | Mod: PBBFAC,PN | Performed by: PODIATRIST

## 2021-07-08 PROCEDURE — 99214 PR OFFICE/OUTPT VISIT, EST, LEVL IV, 30-39 MIN: ICD-10-PCS | Mod: S$PBB,,, | Performed by: PODIATRIST

## 2021-07-08 PROCEDURE — 73630 X-RAY EXAM OF FOOT: CPT | Mod: 26,LT,, | Performed by: RADIOLOGY

## 2021-07-08 PROCEDURE — 73630 XR FOOT COMPLETE 3 VIEW LEFT: ICD-10-PCS | Mod: 26,LT,, | Performed by: RADIOLOGY

## 2021-07-08 RX ORDER — MUPIROCIN 20 MG/G
OINTMENT TOPICAL
COMMUNITY
Start: 2021-05-24 | End: 2022-04-21 | Stop reason: SDUPTHER

## 2021-07-08 RX ORDER — AMIODARONE HYDROCHLORIDE 200 MG/1
TABLET ORAL
COMMUNITY
Start: 2020-12-07 | End: 2021-11-18

## 2021-07-08 RX ORDER — FUROSEMIDE 20 MG/1
TABLET ORAL
COMMUNITY
Start: 2021-03-02

## 2021-07-08 RX ORDER — ATORVASTATIN CALCIUM 40 MG/1
TABLET, FILM COATED ORAL
COMMUNITY
Start: 2020-09-28 | End: 2021-07-08 | Stop reason: SDUPTHER

## 2021-07-08 RX ORDER — DOXYCYCLINE 100 MG/1
CAPSULE ORAL
COMMUNITY
Start: 2021-05-24 | End: 2021-07-08

## 2021-07-22 ENCOUNTER — OFFICE VISIT (OUTPATIENT)
Dept: PODIATRY | Facility: CLINIC | Age: 66
End: 2021-07-22
Payer: MEDICARE

## 2021-07-22 VITALS
TEMPERATURE: 98 F | HEART RATE: 90 BPM | DIASTOLIC BLOOD PRESSURE: 83 MMHG | WEIGHT: 232 LBS | SYSTOLIC BLOOD PRESSURE: 121 MMHG | HEIGHT: 71 IN | BODY MASS INDEX: 32.48 KG/M2

## 2021-07-22 DIAGNOSIS — M20.42 HAMMER TOE OF LEFT FOOT: Primary | ICD-10-CM

## 2021-07-22 DIAGNOSIS — E11.9 TYPE 2 DIABETES MELLITUS WITHOUT COMPLICATION, WITHOUT LONG-TERM CURRENT USE OF INSULIN: ICD-10-CM

## 2021-07-22 DIAGNOSIS — M79.672 LEFT FOOT PAIN: ICD-10-CM

## 2021-07-22 DIAGNOSIS — L97.521 ULCER OF LEFT FOOT, LIMITED TO BREAKDOWN OF SKIN: ICD-10-CM

## 2021-07-22 DIAGNOSIS — R26.81 UNSTEADY GAIT: ICD-10-CM

## 2021-07-22 DIAGNOSIS — M77.52 BONE SPUR OF TOE OF LEFT FOOT: ICD-10-CM

## 2021-07-22 DIAGNOSIS — M19.079 OSTEOARTHRITIS OF ANKLE AND FOOT, UNSPECIFIED LATERALITY: ICD-10-CM

## 2021-07-22 PROCEDURE — 99999 PR PBB SHADOW E&M-EST. PATIENT-LVL V: ICD-10-PCS | Mod: PBBFAC,,, | Performed by: PODIATRIST

## 2021-07-22 PROCEDURE — 99999 PR PBB SHADOW E&M-EST. PATIENT-LVL V: CPT | Mod: PBBFAC,,, | Performed by: PODIATRIST

## 2021-07-22 PROCEDURE — 99215 OFFICE O/P EST HI 40 MIN: CPT | Mod: 57,S$PBB,, | Performed by: PODIATRIST

## 2021-07-22 PROCEDURE — 99215 PR OFFICE/OUTPT VISIT, EST, LEVL V, 40-54 MIN: ICD-10-PCS | Mod: 57,S$PBB,, | Performed by: PODIATRIST

## 2021-07-22 PROCEDURE — 99215 OFFICE O/P EST HI 40 MIN: CPT | Mod: PBBFAC,PN | Performed by: PODIATRIST

## 2021-07-22 RX ORDER — OXYCODONE AND ACETAMINOPHEN 10; 325 MG/1; MG/1
1 TABLET ORAL 4 TIMES DAILY
Qty: 40 TABLET | Refills: 0 | Status: SHIPPED | OUTPATIENT
Start: 2021-07-22 | End: 2021-08-02

## 2021-07-22 RX ORDER — SULFAMETHOXAZOLE AND TRIMETHOPRIM 800; 160 MG/1; MG/1
1 TABLET ORAL 2 TIMES DAILY
Qty: 28 TABLET | Refills: 0 | Status: SHIPPED | OUTPATIENT
Start: 2021-07-22 | End: 2021-08-02

## 2021-07-22 RX ORDER — ONDANSETRON HYDROCHLORIDE 8 MG/1
8 TABLET, FILM COATED ORAL EVERY 8 HOURS PRN
Qty: 42 TABLET | Refills: 1 | Status: SHIPPED | OUTPATIENT
Start: 2021-07-22 | End: 2021-08-05

## 2021-07-23 ENCOUNTER — ANESTHESIA EVENT (OUTPATIENT)
Dept: SURGERY | Facility: HOSPITAL | Age: 66
End: 2021-07-23
Payer: MEDICARE

## 2021-07-23 ENCOUNTER — HOSPITAL ENCOUNTER (OUTPATIENT)
Dept: PREADMISSION TESTING | Facility: HOSPITAL | Age: 66
Discharge: HOME | End: 2021-07-23
Payer: MEDICARE

## 2021-07-23 VITALS — WEIGHT: 232 LBS | HEIGHT: 71 IN | BODY MASS INDEX: 32.48 KG/M2

## 2021-07-23 PROCEDURE — 99900103 DSU ONLY-NO CHARGE-INITIAL HR (STAT)

## 2021-07-23 RX ORDER — SODIUM CHLORIDE, SODIUM LACTATE, POTASSIUM CHLORIDE, CALCIUM CHLORIDE 600; 310; 30; 20 MG/100ML; MG/100ML; MG/100ML; MG/100ML
INJECTION, SOLUTION INTRAVENOUS CONTINUOUS
Status: CANCELLED | OUTPATIENT
Start: 2021-07-27

## 2021-07-26 ENCOUNTER — TELEPHONE (OUTPATIENT)
Dept: PODIATRY | Facility: CLINIC | Age: 66
End: 2021-07-26

## 2021-07-27 ENCOUNTER — HOSPITAL ENCOUNTER (OUTPATIENT)
Facility: HOSPITAL | Age: 66
Discharge: HOME | End: 2021-07-27
Attending: PODIATRIST | Admitting: PODIATRIST
Payer: MEDICARE

## 2021-07-27 ENCOUNTER — ANESTHESIA (OUTPATIENT)
Dept: SURGERY | Facility: HOSPITAL | Age: 66
End: 2021-07-27
Payer: MEDICARE

## 2021-07-27 VITALS
SYSTOLIC BLOOD PRESSURE: 168 MMHG | HEIGHT: 71 IN | TEMPERATURE: 98 F | RESPIRATION RATE: 20 BRPM | DIASTOLIC BLOOD PRESSURE: 98 MMHG | BODY MASS INDEX: 32.36 KG/M2 | OXYGEN SATURATION: 99 % | HEART RATE: 70 BPM

## 2021-07-27 DIAGNOSIS — M77.52 BONE SPUR OF TOE OF LEFT FOOT: ICD-10-CM

## 2021-07-27 DIAGNOSIS — M20.42 HAMMER TOE OF LEFT FOOT: ICD-10-CM

## 2021-07-27 DIAGNOSIS — M19.079 OSTEOARTHRITIS OF ANKLE AND FOOT, UNSPECIFIED LATERALITY: Primary | ICD-10-CM

## 2021-07-27 DIAGNOSIS — E11.9 COMPREHENSIVE DIABETIC FOOT EXAMINATION, TYPE 2 DM, ENCOUNTER FOR: ICD-10-CM

## 2021-07-27 DIAGNOSIS — E11.9 TYPE 2 DIABETES MELLITUS WITHOUT COMPLICATION, WITHOUT LONG-TERM CURRENT USE OF INSULIN: ICD-10-CM

## 2021-07-27 DIAGNOSIS — M79.672 LEFT FOOT PAIN: ICD-10-CM

## 2021-07-27 DIAGNOSIS — L98.9 SKIN LESION OF FOOT: ICD-10-CM

## 2021-07-27 PROCEDURE — 88305 TISSUE EXAM BY PATHOLOGIST: ICD-10-PCS | Mod: 26,,, | Performed by: PATHOLOGY

## 2021-07-27 PROCEDURE — 28039 PR EXCISION TUMOR SOFT TISSUE FOOT/TOE SUBQ 1.5+CM: ICD-10-PCS | Mod: LT,,, | Performed by: PODIATRIST

## 2021-07-27 PROCEDURE — 27201423 OPTIME MED/SURG SUP & DEVICES STERILE SUPPLY: Performed by: PODIATRIST

## 2021-07-27 PROCEDURE — 71000015 HC POSTOP RECOV 1ST HR: Performed by: PODIATRIST

## 2021-07-27 PROCEDURE — 28108 PR REMV TOE BENIGN BONE LESN: ICD-10-PCS | Mod: 51,TA,, | Performed by: PODIATRIST

## 2021-07-27 PROCEDURE — 25000003 PHARM REV CODE 250: Performed by: PODIATRIST

## 2021-07-27 PROCEDURE — D9220A PRA ANESTHESIA: Mod: ,,, | Performed by: ANESTHESIOLOGY

## 2021-07-27 PROCEDURE — 28108 REMOVAL OF TOE LESIONS: CPT | Mod: 51,TA,, | Performed by: PODIATRIST

## 2021-07-27 PROCEDURE — D9220A PRA ANESTHESIA: ICD-10-PCS | Mod: ,,, | Performed by: ANESTHESIOLOGY

## 2021-07-27 PROCEDURE — 37000008 HC ANESTHESIA 1ST 15 MINUTES: Performed by: PODIATRIST

## 2021-07-27 PROCEDURE — 88305 TISSUE EXAM BY PATHOLOGIST: CPT | Mod: 26,,, | Performed by: PATHOLOGY

## 2021-07-27 PROCEDURE — 28285 PR REPAIR OF HAMMERTOE,ONE: ICD-10-PCS | Mod: 51,T4,, | Performed by: PODIATRIST

## 2021-07-27 PROCEDURE — 88305 TISSUE EXAM BY PATHOLOGIST: CPT | Performed by: PATHOLOGY

## 2021-07-27 PROCEDURE — 28285 REPAIR OF HAMMERTOE: CPT | Mod: 51,T4,, | Performed by: PODIATRIST

## 2021-07-27 PROCEDURE — 63600175 PHARM REV CODE 636 W HCPCS: Performed by: ANESTHESIOLOGY

## 2021-07-27 PROCEDURE — 63600175 PHARM REV CODE 636 W HCPCS: Performed by: NURSE ANESTHETIST, CERTIFIED REGISTERED

## 2021-07-27 PROCEDURE — 25000003 PHARM REV CODE 250: Performed by: ANESTHESIOLOGY

## 2021-07-27 PROCEDURE — 36000707: Performed by: PODIATRIST

## 2021-07-27 PROCEDURE — 28039 EXC FOOT/TOE TUM SC 1.5 CM/>: CPT | Mod: LT,,, | Performed by: PODIATRIST

## 2021-07-27 PROCEDURE — 25000003 PHARM REV CODE 250: Performed by: NURSE ANESTHETIST, CERTIFIED REGISTERED

## 2021-07-27 PROCEDURE — 36000706: Performed by: PODIATRIST

## 2021-07-27 PROCEDURE — 71000033 HC RECOVERY, INTIAL HOUR: Performed by: PODIATRIST

## 2021-07-27 PROCEDURE — 37000009 HC ANESTHESIA EA ADD 15 MINS: Performed by: PODIATRIST

## 2021-07-27 RX ORDER — LIDOCAINE HYDROCHLORIDE 10 MG/ML
1 INJECTION, SOLUTION EPIDURAL; INFILTRATION; INTRACAUDAL; PERINEURAL ONCE
Status: DISCONTINUED | OUTPATIENT
Start: 2021-07-27 | End: 2021-07-27 | Stop reason: HOSPADM

## 2021-07-27 RX ORDER — CLINDAMYCIN PHOSPHATE 600 MG/50ML
600 INJECTION, SOLUTION INTRAVENOUS
Status: COMPLETED | OUTPATIENT
Start: 2021-07-27 | End: 2021-07-27

## 2021-07-27 RX ORDER — PROPOFOL 10 MG/ML
VIAL (ML) INTRAVENOUS
Status: DISCONTINUED | OUTPATIENT
Start: 2021-07-27 | End: 2021-07-27

## 2021-07-27 RX ORDER — SODIUM CHLORIDE, SODIUM LACTATE, POTASSIUM CHLORIDE, CALCIUM CHLORIDE 600; 310; 30; 20 MG/100ML; MG/100ML; MG/100ML; MG/100ML
125 INJECTION, SOLUTION INTRAVENOUS CONTINUOUS
Status: DISCONTINUED | OUTPATIENT
Start: 2021-07-27 | End: 2021-07-27 | Stop reason: HOSPADM

## 2021-07-27 RX ORDER — LIDOCAINE HYDROCHLORIDE 10 MG/ML
INJECTION INFILTRATION; PERINEURAL
Status: DISCONTINUED | OUTPATIENT
Start: 2021-07-27 | End: 2021-07-27 | Stop reason: HOSPADM

## 2021-07-27 RX ORDER — SODIUM CHLORIDE, SODIUM LACTATE, POTASSIUM CHLORIDE, CALCIUM CHLORIDE 600; 310; 30; 20 MG/100ML; MG/100ML; MG/100ML; MG/100ML
INJECTION, SOLUTION INTRAVENOUS CONTINUOUS
Status: DISCONTINUED | OUTPATIENT
Start: 2021-07-27 | End: 2021-07-27 | Stop reason: HOSPADM

## 2021-07-27 RX ORDER — FAMOTIDINE 10 MG/ML
INJECTION INTRAVENOUS
Status: DISCONTINUED
Start: 2021-07-27 | End: 2021-07-27 | Stop reason: HOSPADM

## 2021-07-27 RX ORDER — MEPERIDINE HYDROCHLORIDE 50 MG/ML
INJECTION INTRAMUSCULAR; INTRAVENOUS; SUBCUTANEOUS
Status: DISCONTINUED | OUTPATIENT
Start: 2021-07-27 | End: 2021-07-27

## 2021-07-27 RX ORDER — CLINDAMYCIN PHOSPHATE 600 MG/50ML
INJECTION, SOLUTION INTRAVENOUS
Status: DISCONTINUED
Start: 2021-07-27 | End: 2021-07-27 | Stop reason: HOSPADM

## 2021-07-27 RX ORDER — ONDANSETRON 2 MG/ML
4 INJECTION INTRAMUSCULAR; INTRAVENOUS DAILY PRN
Status: DISCONTINUED | OUTPATIENT
Start: 2021-07-27 | End: 2021-07-27 | Stop reason: HOSPADM

## 2021-07-27 RX ORDER — LIDOCAINE HYDROCHLORIDE 20 MG/ML
INJECTION, SOLUTION EPIDURAL; INFILTRATION; INTRACAUDAL; PERINEURAL
Status: DISCONTINUED | OUTPATIENT
Start: 2021-07-27 | End: 2021-07-27

## 2021-07-27 RX ORDER — FAMOTIDINE 10 MG/ML
20 INJECTION INTRAVENOUS ONCE
Status: COMPLETED | OUTPATIENT
Start: 2021-07-27 | End: 2021-07-27

## 2021-07-27 RX ORDER — DIPHENHYDRAMINE HYDROCHLORIDE 50 MG/ML
12.5 INJECTION INTRAMUSCULAR; INTRAVENOUS
Status: DISCONTINUED | OUTPATIENT
Start: 2021-07-27 | End: 2021-07-27 | Stop reason: HOSPADM

## 2021-07-27 RX ORDER — MIDAZOLAM HYDROCHLORIDE 1 MG/ML
INJECTION, SOLUTION INTRAMUSCULAR; INTRAVENOUS
Status: DISCONTINUED | OUTPATIENT
Start: 2021-07-27 | End: 2021-07-27

## 2021-07-27 RX ORDER — MORPHINE SULFATE 4 MG/ML
2 INJECTION, SOLUTION INTRAMUSCULAR; INTRAVENOUS EVERY 5 MIN PRN
Status: DISCONTINUED | OUTPATIENT
Start: 2021-07-27 | End: 2021-07-27 | Stop reason: HOSPADM

## 2021-07-27 RX ADMIN — PROPOFOL 20 MG: 10 INJECTION, EMULSION INTRAVENOUS at 07:07

## 2021-07-27 RX ADMIN — PROPOFOL 20 MG: 10 INJECTION, EMULSION INTRAVENOUS at 08:07

## 2021-07-27 RX ADMIN — LIDOCAINE HYDROCHLORIDE 100 MG: 20 INJECTION, SOLUTION EPIDURAL; INFILTRATION; INTRACAUDAL; PERINEURAL at 07:07

## 2021-07-27 RX ADMIN — FAMOTIDINE 20 MG: 10 INJECTION INTRAVENOUS at 07:07

## 2021-07-27 RX ADMIN — MEPERIDINE HYDROCHLORIDE 15 MG: 50 INJECTION INTRAMUSCULAR; INTRAVENOUS; SUBCUTANEOUS at 08:07

## 2021-07-27 RX ADMIN — SODIUM CHLORIDE, POTASSIUM CHLORIDE, SODIUM LACTATE AND CALCIUM CHLORIDE: 600; 310; 30; 20 INJECTION, SOLUTION INTRAVENOUS at 08:07

## 2021-07-27 RX ADMIN — MIDAZOLAM HYDROCHLORIDE 2 MG: 1 INJECTION, SOLUTION INTRAMUSCULAR; INTRAVENOUS at 07:07

## 2021-07-27 RX ADMIN — MEPERIDINE HYDROCHLORIDE 10 MG: 50 INJECTION INTRAMUSCULAR; INTRAVENOUS; SUBCUTANEOUS at 08:07

## 2021-07-27 RX ADMIN — SODIUM CHLORIDE, POTASSIUM CHLORIDE, SODIUM LACTATE AND CALCIUM CHLORIDE: 600; 310; 30; 20 INJECTION, SOLUTION INTRAVENOUS at 07:07

## 2021-07-27 RX ADMIN — MEPERIDINE HYDROCHLORIDE 50 MG: 50 INJECTION INTRAMUSCULAR; INTRAVENOUS; SUBCUTANEOUS at 07:07

## 2021-07-27 RX ADMIN — CLINDAMYCIN IN 5 PERCENT DEXTROSE 600 MG: 12 INJECTION, SOLUTION INTRAVENOUS at 08:07

## 2021-07-28 ENCOUNTER — TELEPHONE (OUTPATIENT)
Dept: PODIATRY | Facility: CLINIC | Age: 66
End: 2021-07-28

## 2021-07-29 ENCOUNTER — OFFICE VISIT (OUTPATIENT)
Dept: PODIATRY | Facility: CLINIC | Age: 66
End: 2021-07-29
Payer: MEDICARE

## 2021-07-29 VITALS
BODY MASS INDEX: 32.48 KG/M2 | SYSTOLIC BLOOD PRESSURE: 128 MMHG | TEMPERATURE: 98 F | HEART RATE: 91 BPM | DIASTOLIC BLOOD PRESSURE: 86 MMHG | WEIGHT: 232 LBS | HEIGHT: 71 IN

## 2021-07-29 DIAGNOSIS — M77.52 BONE SPUR OF TOE OF LEFT FOOT: Primary | ICD-10-CM

## 2021-07-29 DIAGNOSIS — M20.42 HAMMER TOE OF LEFT FOOT: ICD-10-CM

## 2021-07-29 LAB
FINAL PATHOLOGIC DIAGNOSIS: NORMAL
GROSS: NORMAL
Lab: NORMAL
MICROSCOPIC EXAM: NORMAL

## 2021-07-29 PROCEDURE — 99999 PR PBB SHADOW E&M-EST. PATIENT-LVL V: CPT | Mod: PBBFAC,,, | Performed by: PODIATRIST

## 2021-07-29 PROCEDURE — 99215 OFFICE O/P EST HI 40 MIN: CPT | Mod: PBBFAC,PN | Performed by: PODIATRIST

## 2021-07-29 PROCEDURE — 99024 POSTOP FOLLOW-UP VISIT: CPT | Mod: POP,,, | Performed by: PODIATRIST

## 2021-07-29 PROCEDURE — 99024 PR POST-OP FOLLOW-UP VISIT: ICD-10-PCS | Mod: POP,,, | Performed by: PODIATRIST

## 2021-07-29 PROCEDURE — 99999 PR PBB SHADOW E&M-EST. PATIENT-LVL V: ICD-10-PCS | Mod: PBBFAC,,, | Performed by: PODIATRIST

## 2021-08-01 ENCOUNTER — NURSE TRIAGE (OUTPATIENT)
Dept: ADMINISTRATIVE | Facility: CLINIC | Age: 66
End: 2021-08-01

## 2021-08-02 ENCOUNTER — TELEPHONE (OUTPATIENT)
Dept: PODIATRY | Facility: CLINIC | Age: 66
End: 2021-08-02

## 2021-08-02 RX ORDER — DOXYCYCLINE 100 MG/1
100 CAPSULE ORAL EVERY 12 HOURS
Qty: 14 CAPSULE | Refills: 0 | Status: SHIPPED | OUTPATIENT
Start: 2021-08-02 | End: 2021-08-09

## 2021-08-05 ENCOUNTER — OFFICE VISIT (OUTPATIENT)
Dept: PODIATRY | Facility: CLINIC | Age: 66
End: 2021-08-05
Payer: MEDICARE

## 2021-08-05 VITALS
TEMPERATURE: 98 F | BODY MASS INDEX: 32.48 KG/M2 | WEIGHT: 232 LBS | SYSTOLIC BLOOD PRESSURE: 153 MMHG | HEIGHT: 71 IN | HEART RATE: 88 BPM | DIASTOLIC BLOOD PRESSURE: 84 MMHG

## 2021-08-05 DIAGNOSIS — M19.079 OSTEOARTHRITIS OF ANKLE AND FOOT, UNSPECIFIED LATERALITY: Primary | ICD-10-CM

## 2021-08-05 PROCEDURE — 99024 PR POST-OP FOLLOW-UP VISIT: ICD-10-PCS | Mod: POP,,, | Performed by: PODIATRIST

## 2021-08-05 PROCEDURE — 99999 PR PBB SHADOW E&M-EST. PATIENT-LVL IV: ICD-10-PCS | Mod: PBBFAC,,, | Performed by: PODIATRIST

## 2021-08-05 PROCEDURE — 99214 OFFICE O/P EST MOD 30 MIN: CPT | Mod: PBBFAC,PN | Performed by: PODIATRIST

## 2021-08-05 PROCEDURE — 99999 PR PBB SHADOW E&M-EST. PATIENT-LVL IV: CPT | Mod: PBBFAC,,, | Performed by: PODIATRIST

## 2021-08-05 PROCEDURE — 99024 POSTOP FOLLOW-UP VISIT: CPT | Mod: POP,,, | Performed by: PODIATRIST

## 2021-08-19 ENCOUNTER — OFFICE VISIT (OUTPATIENT)
Dept: PODIATRY | Facility: CLINIC | Age: 66
End: 2021-08-19
Payer: MEDICARE

## 2021-08-19 VITALS
HEART RATE: 100 BPM | BODY MASS INDEX: 32.2 KG/M2 | DIASTOLIC BLOOD PRESSURE: 94 MMHG | WEIGHT: 230 LBS | TEMPERATURE: 98 F | SYSTOLIC BLOOD PRESSURE: 140 MMHG | HEIGHT: 71 IN

## 2021-08-19 DIAGNOSIS — M19.079 OSTEOARTHRITIS OF ANKLE AND FOOT, UNSPECIFIED LATERALITY: Primary | ICD-10-CM

## 2021-08-19 PROCEDURE — 99999 PR PBB SHADOW E&M-EST. PATIENT-LVL IV: ICD-10-PCS | Mod: PBBFAC,,, | Performed by: PODIATRIST

## 2021-08-19 PROCEDURE — 99024 PR POST-OP FOLLOW-UP VISIT: ICD-10-PCS | Mod: POP,,, | Performed by: PODIATRIST

## 2021-08-19 PROCEDURE — 99024 POSTOP FOLLOW-UP VISIT: CPT | Mod: POP,,, | Performed by: PODIATRIST

## 2021-08-19 PROCEDURE — 99999 PR PBB SHADOW E&M-EST. PATIENT-LVL IV: CPT | Mod: PBBFAC,,, | Performed by: PODIATRIST

## 2021-08-19 PROCEDURE — 99214 OFFICE O/P EST MOD 30 MIN: CPT | Mod: PBBFAC,PN | Performed by: PODIATRIST

## 2021-08-19 RX ORDER — LANCETS 30 GAUGE
EACH MISCELLANEOUS
COMMUNITY
Start: 2021-08-12 | End: 2022-07-18 | Stop reason: SDUPTHER

## 2021-08-24 ENCOUNTER — OFFICE VISIT (OUTPATIENT)
Dept: PODIATRY | Facility: CLINIC | Age: 66
End: 2021-08-24
Payer: MEDICARE

## 2021-08-24 VITALS
WEIGHT: 230 LBS | SYSTOLIC BLOOD PRESSURE: 143 MMHG | HEART RATE: 99 BPM | HEIGHT: 71 IN | DIASTOLIC BLOOD PRESSURE: 83 MMHG | TEMPERATURE: 98 F | BODY MASS INDEX: 32.2 KG/M2

## 2021-08-24 DIAGNOSIS — M19.079 OSTEOARTHRITIS OF ANKLE AND FOOT, UNSPECIFIED LATERALITY: Primary | ICD-10-CM

## 2021-08-24 PROCEDURE — 99999 PR PBB SHADOW E&M-EST. PATIENT-LVL IV: CPT | Mod: PBBFAC,,, | Performed by: PODIATRIST

## 2021-08-24 PROCEDURE — 99999 PR PBB SHADOW E&M-EST. PATIENT-LVL IV: ICD-10-PCS | Mod: PBBFAC,,, | Performed by: PODIATRIST

## 2021-08-24 PROCEDURE — 99214 OFFICE O/P EST MOD 30 MIN: CPT | Mod: PBBFAC,PN | Performed by: PODIATRIST

## 2021-08-24 PROCEDURE — 99024 PR POST-OP FOLLOW-UP VISIT: ICD-10-PCS | Mod: POP,,, | Performed by: PODIATRIST

## 2021-08-24 PROCEDURE — 99024 POSTOP FOLLOW-UP VISIT: CPT | Mod: POP,,, | Performed by: PODIATRIST

## 2021-08-31 ENCOUNTER — TELEPHONE (OUTPATIENT)
Dept: PODIATRY | Facility: CLINIC | Age: 66
End: 2021-08-31

## 2021-09-09 ENCOUNTER — OFFICE VISIT (OUTPATIENT)
Dept: PODIATRY | Facility: CLINIC | Age: 66
End: 2021-09-09
Payer: MEDICARE

## 2021-09-09 VITALS
TEMPERATURE: 98 F | BODY MASS INDEX: 32.2 KG/M2 | HEIGHT: 71 IN | DIASTOLIC BLOOD PRESSURE: 90 MMHG | SYSTOLIC BLOOD PRESSURE: 147 MMHG | WEIGHT: 230 LBS | HEART RATE: 96 BPM

## 2021-09-09 DIAGNOSIS — M19.079 OSTEOARTHRITIS OF ANKLE AND FOOT, UNSPECIFIED LATERALITY: ICD-10-CM

## 2021-09-09 DIAGNOSIS — L60.0 INGROWN NAIL: Primary | ICD-10-CM

## 2021-09-09 PROCEDURE — 99999 PR PBB SHADOW E&M-EST. PATIENT-LVL IV: ICD-10-PCS | Mod: PBBFAC,,, | Performed by: PODIATRIST

## 2021-09-09 PROCEDURE — 99214 OFFICE O/P EST MOD 30 MIN: CPT | Mod: PBBFAC,PN | Performed by: PODIATRIST

## 2021-09-09 PROCEDURE — 99999 PR PBB SHADOW E&M-EST. PATIENT-LVL IV: CPT | Mod: PBBFAC,,, | Performed by: PODIATRIST

## 2021-09-09 PROCEDURE — 99212 PR OFFICE/OUTPT VISIT, EST, LEVL II, 10-19 MIN: ICD-10-PCS | Mod: 24,S$PBB,, | Performed by: PODIATRIST

## 2021-09-09 PROCEDURE — 99212 OFFICE O/P EST SF 10 MIN: CPT | Mod: 24,S$PBB,, | Performed by: PODIATRIST

## 2021-10-26 ENCOUNTER — OFFICE VISIT (OUTPATIENT)
Dept: PODIATRY | Facility: CLINIC | Age: 66
End: 2021-10-26
Payer: MEDICARE

## 2021-10-26 VITALS
BODY MASS INDEX: 32.62 KG/M2 | HEART RATE: 82 BPM | TEMPERATURE: 98 F | HEIGHT: 71 IN | SYSTOLIC BLOOD PRESSURE: 133 MMHG | WEIGHT: 233 LBS | DIASTOLIC BLOOD PRESSURE: 76 MMHG

## 2021-10-26 DIAGNOSIS — M19.071 OSTEOARTHRITIS OF RIGHT ANKLE AND FOOT: ICD-10-CM

## 2021-10-26 DIAGNOSIS — E11.9 TYPE 2 DIABETES MELLITUS WITHOUT COMPLICATION, WITHOUT LONG-TERM CURRENT USE OF INSULIN: ICD-10-CM

## 2021-10-26 DIAGNOSIS — M20.41 HAMMER TOE OF RIGHT FOOT: ICD-10-CM

## 2021-10-26 DIAGNOSIS — E11.9 COMPREHENSIVE DIABETIC FOOT EXAMINATION, TYPE 2 DM, ENCOUNTER FOR: Primary | ICD-10-CM

## 2021-10-26 PROCEDURE — 99214 OFFICE O/P EST MOD 30 MIN: CPT | Mod: 24,S$PBB,, | Performed by: PODIATRIST

## 2021-10-26 PROCEDURE — 99214 PR OFFICE/OUTPT VISIT, EST, LEVL IV, 30-39 MIN: ICD-10-PCS | Mod: 24,S$PBB,, | Performed by: PODIATRIST

## 2021-10-26 PROCEDURE — 99999 PR PBB SHADOW E&M-EST. PATIENT-LVL IV: ICD-10-PCS | Mod: PBBFAC,,, | Performed by: PODIATRIST

## 2021-10-26 PROCEDURE — 99214 OFFICE O/P EST MOD 30 MIN: CPT | Mod: PBBFAC,PN | Performed by: PODIATRIST

## 2021-10-26 PROCEDURE — 99999 PR PBB SHADOW E&M-EST. PATIENT-LVL IV: CPT | Mod: PBBFAC,,, | Performed by: PODIATRIST

## 2021-10-26 RX ORDER — METOPROLOL SUCCINATE 25 MG/1
25 TABLET, EXTENDED RELEASE ORAL DAILY
COMMUNITY
Start: 2021-10-18 | End: 2023-08-20

## 2021-10-30 PROBLEM — M20.42 HAMMER TOE OF LEFT FOOT: Status: RESOLVED | Noted: 2019-06-29 | Resolved: 2021-10-30

## 2021-10-30 PROBLEM — M77.52 BONE SPUR OF TOE OF LEFT FOOT: Status: RESOLVED | Noted: 2020-11-19 | Resolved: 2021-10-30

## 2021-10-30 PROBLEM — M20.41 HAMMER TOE OF RIGHT FOOT: Status: ACTIVE | Noted: 2021-10-30

## 2021-11-10 ENCOUNTER — LAB VISIT (OUTPATIENT)
Dept: LAB | Facility: HOSPITAL | Age: 66
End: 2021-11-10
Attending: INTERNAL MEDICINE
Payer: MEDICARE

## 2021-11-10 DIAGNOSIS — I10 ESSENTIAL HYPERTENSION: ICD-10-CM

## 2021-11-10 DIAGNOSIS — K76.0 FATTY LIVER: ICD-10-CM

## 2021-11-10 DIAGNOSIS — E11.9 TYPE 2 DIABETES MELLITUS WITHOUT COMPLICATION, WITHOUT LONG-TERM CURRENT USE OF INSULIN: ICD-10-CM

## 2021-11-10 DIAGNOSIS — Z79.899 ENCOUNTER FOR LONG-TERM CURRENT USE OF MEDICATION: ICD-10-CM

## 2021-11-10 DIAGNOSIS — M51.36 DDD (DEGENERATIVE DISC DISEASE), LUMBAR: ICD-10-CM

## 2021-11-10 DIAGNOSIS — K21.00 GASTROESOPHAGEAL REFLUX DISEASE WITH ESOPHAGITIS WITHOUT HEMORRHAGE: ICD-10-CM

## 2021-11-10 DIAGNOSIS — I67.9 SMALL VESSEL DISEASE, CEREBROVASCULAR: ICD-10-CM

## 2021-11-10 DIAGNOSIS — R42 DIZZINESS: ICD-10-CM

## 2021-11-10 DIAGNOSIS — E78.00 HYPERCHOLESTEREMIA: ICD-10-CM

## 2021-11-10 DIAGNOSIS — Z95.0 PACEMAKER: ICD-10-CM

## 2021-11-10 LAB
ALBUMIN SERPL BCP-MCNC: 4.1 G/DL (ref 3.5–5.2)
ALP SERPL-CCNC: 181 U/L (ref 55–135)
ALT SERPL W/O P-5'-P-CCNC: 33 U/L (ref 10–44)
ANION GAP SERPL CALC-SCNC: 8 MMOL/L (ref 8–16)
AST SERPL-CCNC: 17 U/L (ref 10–40)
BASOPHILS # BLD AUTO: 0.03 K/UL (ref 0–0.2)
BASOPHILS NFR BLD: 0.4 % (ref 0–1.9)
BILIRUB SERPL-MCNC: 1.2 MG/DL (ref 0.1–1)
BNP SERPL-MCNC: <10 PG/ML (ref 0–99)
BUN SERPL-MCNC: 22 MG/DL (ref 8–23)
CALCIUM SERPL-MCNC: 9.6 MG/DL (ref 8.7–10.5)
CHLORIDE SERPL-SCNC: 107 MMOL/L (ref 95–110)
CHOLEST SERPL-MCNC: 149 MG/DL (ref 120–199)
CHOLEST/HDLC SERPL: 3.4 {RATIO} (ref 2–5)
CO2 SERPL-SCNC: 26 MMOL/L (ref 23–29)
CREAT SERPL-MCNC: 0.9 MG/DL (ref 0.5–1.4)
DIFFERENTIAL METHOD: NORMAL
EOSINOPHIL # BLD AUTO: 0.1 K/UL (ref 0–0.5)
EOSINOPHIL NFR BLD: 0.9 % (ref 0–8)
ERYTHROCYTE [DISTWIDTH] IN BLOOD BY AUTOMATED COUNT: 13 % (ref 11.5–14.5)
EST. GFR  (AFRICAN AMERICAN): >60 ML/MIN/1.73 M^2
EST. GFR  (NON AFRICAN AMERICAN): >60 ML/MIN/1.73 M^2
ESTIMATED AVG GLUCOSE: 140 MG/DL (ref 68–131)
GLUCOSE SERPL-MCNC: 104 MG/DL (ref 70–110)
HBA1C MFR BLD: 6.5 % (ref 4–5.6)
HCT VFR BLD AUTO: 43.5 % (ref 40–54)
HDLC SERPL-MCNC: 44 MG/DL (ref 40–75)
HDLC SERPL: 29.5 % (ref 20–50)
HGB BLD-MCNC: 14.9 G/DL (ref 14–18)
IMM GRANULOCYTES # BLD AUTO: 0.02 K/UL (ref 0–0.04)
IMM GRANULOCYTES NFR BLD AUTO: 0.3 % (ref 0–0.5)
LDLC SERPL CALC-MCNC: 85.8 MG/DL (ref 63–159)
LYMPHOCYTES # BLD AUTO: 1.4 K/UL (ref 1–4.8)
LYMPHOCYTES NFR BLD: 20.2 % (ref 18–48)
MAGNESIUM SERPL-MCNC: 1.8 MG/DL (ref 1.6–2.6)
MCH RBC QN AUTO: 29.2 PG (ref 27–31)
MCHC RBC AUTO-ENTMCNC: 34.3 G/DL (ref 32–36)
MCV RBC AUTO: 85 FL (ref 82–98)
MONOCYTES # BLD AUTO: 0.7 K/UL (ref 0.3–1)
MONOCYTES NFR BLD: 10 % (ref 4–15)
NEUTROPHILS # BLD AUTO: 4.5 K/UL (ref 1.8–7.7)
NEUTROPHILS NFR BLD: 68.2 % (ref 38–73)
NONHDLC SERPL-MCNC: 105 MG/DL
NRBC BLD-RTO: 0 /100 WBC
PLATELET # BLD AUTO: 187 K/UL (ref 150–450)
PMV BLD AUTO: 10.1 FL (ref 9.2–12.9)
POTASSIUM SERPL-SCNC: 4.2 MMOL/L (ref 3.5–5.1)
PROT SERPL-MCNC: 6.6 G/DL (ref 6–8.4)
RBC # BLD AUTO: 5.1 M/UL (ref 4.6–6.2)
SODIUM SERPL-SCNC: 141 MMOL/L (ref 136–145)
TRIGL SERPL-MCNC: 96 MG/DL (ref 30–150)
TSH SERPL DL<=0.005 MIU/L-ACNC: 1.09 UIU/ML (ref 0.4–4)
WBC # BLD AUTO: 6.67 K/UL (ref 3.9–12.7)

## 2021-11-10 PROCEDURE — 36415 COLL VENOUS BLD VENIPUNCTURE: CPT | Performed by: INTERNAL MEDICINE

## 2021-11-10 PROCEDURE — 83735 ASSAY OF MAGNESIUM: CPT | Performed by: INTERNAL MEDICINE

## 2021-11-10 PROCEDURE — 84681 ASSAY OF C-PEPTIDE: CPT | Performed by: INTERNAL MEDICINE

## 2021-11-10 PROCEDURE — 80061 LIPID PANEL: CPT | Performed by: INTERNAL MEDICINE

## 2021-11-10 PROCEDURE — 80053 COMPREHEN METABOLIC PANEL: CPT | Performed by: INTERNAL MEDICINE

## 2021-11-10 PROCEDURE — 84443 ASSAY THYROID STIM HORMONE: CPT | Performed by: INTERNAL MEDICINE

## 2021-11-10 PROCEDURE — 83880 ASSAY OF NATRIURETIC PEPTIDE: CPT | Performed by: INTERNAL MEDICINE

## 2021-11-10 PROCEDURE — 85025 COMPLETE CBC W/AUTO DIFF WBC: CPT | Performed by: INTERNAL MEDICINE

## 2021-11-10 PROCEDURE — 84436 ASSAY OF TOTAL THYROXINE: CPT | Performed by: INTERNAL MEDICINE

## 2021-11-10 PROCEDURE — 83036 HEMOGLOBIN GLYCOSYLATED A1C: CPT | Performed by: INTERNAL MEDICINE

## 2021-11-11 LAB
C PEPTIDE SERPL-MCNC: 3.99 NG/ML (ref 0.78–5.19)
T4 SERPL-MCNC: 8.1 UG/DL (ref 4.5–11.5)

## 2021-11-29 ENCOUNTER — TELEPHONE (OUTPATIENT)
Dept: PODIATRY | Facility: CLINIC | Age: 66
End: 2021-11-29
Payer: MEDICARE

## 2021-12-09 ENCOUNTER — OFFICE VISIT (OUTPATIENT)
Dept: PODIATRY | Facility: CLINIC | Age: 66
End: 2021-12-09
Payer: MEDICARE

## 2021-12-09 VITALS
DIASTOLIC BLOOD PRESSURE: 84 MMHG | BODY MASS INDEX: 32.62 KG/M2 | HEIGHT: 71 IN | HEART RATE: 97 BPM | TEMPERATURE: 98 F | WEIGHT: 233 LBS | SYSTOLIC BLOOD PRESSURE: 146 MMHG

## 2021-12-09 DIAGNOSIS — E11.9 TYPE 2 DIABETES MELLITUS WITHOUT COMPLICATION, WITHOUT LONG-TERM CURRENT USE OF INSULIN: ICD-10-CM

## 2021-12-09 DIAGNOSIS — M20.41 HAMMER TOE OF RIGHT FOOT: ICD-10-CM

## 2021-12-09 DIAGNOSIS — L97.511 ULCER OF RIGHT FOOT, LIMITED TO BREAKDOWN OF SKIN: Primary | ICD-10-CM

## 2021-12-09 DIAGNOSIS — E11.9 COMPREHENSIVE DIABETIC FOOT EXAMINATION, TYPE 2 DM, ENCOUNTER FOR: ICD-10-CM

## 2021-12-09 PROCEDURE — 99999 PR PBB SHADOW E&M-EST. PATIENT-LVL IV: ICD-10-PCS | Mod: PBBFAC,,, | Performed by: PODIATRIST

## 2021-12-09 PROCEDURE — 99213 PR OFFICE/OUTPT VISIT, EST, LEVL III, 20-29 MIN: ICD-10-PCS | Mod: S$PBB,,, | Performed by: PODIATRIST

## 2021-12-09 PROCEDURE — 99213 OFFICE O/P EST LOW 20 MIN: CPT | Mod: S$PBB,,, | Performed by: PODIATRIST

## 2021-12-09 PROCEDURE — 99999 PR PBB SHADOW E&M-EST. PATIENT-LVL IV: CPT | Mod: PBBFAC,,, | Performed by: PODIATRIST

## 2021-12-09 PROCEDURE — 99214 OFFICE O/P EST MOD 30 MIN: CPT | Mod: PBBFAC,PN | Performed by: PODIATRIST

## 2021-12-09 RX ORDER — AZITHROMYCIN 250 MG/1
250 TABLET, FILM COATED ORAL
COMMUNITY
Start: 2021-12-01 | End: 2022-02-08

## 2021-12-28 ENCOUNTER — NURSE TRIAGE (OUTPATIENT)
Dept: ADMINISTRATIVE | Facility: CLINIC | Age: 66
End: 2021-12-28
Payer: MEDICARE

## 2022-02-08 ENCOUNTER — OFFICE VISIT (OUTPATIENT)
Dept: PODIATRY | Facility: CLINIC | Age: 67
End: 2022-02-08
Payer: MEDICARE

## 2022-02-08 VITALS
HEART RATE: 94 BPM | TEMPERATURE: 98 F | DIASTOLIC BLOOD PRESSURE: 7 MMHG | BODY MASS INDEX: 32.62 KG/M2 | WEIGHT: 233 LBS | SYSTOLIC BLOOD PRESSURE: 128 MMHG | HEIGHT: 71 IN

## 2022-02-08 DIAGNOSIS — R26.81 UNSTEADY GAIT: ICD-10-CM

## 2022-02-08 DIAGNOSIS — E11.9 COMPREHENSIVE DIABETIC FOOT EXAMINATION, TYPE 2 DM, ENCOUNTER FOR: ICD-10-CM

## 2022-02-08 DIAGNOSIS — L60.0 INGROWN NAIL: Primary | ICD-10-CM

## 2022-02-08 DIAGNOSIS — E11.9 TYPE 2 DIABETES MELLITUS WITHOUT COMPLICATION, WITHOUT LONG-TERM CURRENT USE OF INSULIN: ICD-10-CM

## 2022-02-08 DIAGNOSIS — L97.511 ULCER OF RIGHT FOOT, LIMITED TO BREAKDOWN OF SKIN: ICD-10-CM

## 2022-02-08 DIAGNOSIS — M19.071 OSTEOARTHRITIS OF RIGHT ANKLE AND FOOT: ICD-10-CM

## 2022-02-08 PROCEDURE — 99214 OFFICE O/P EST MOD 30 MIN: CPT | Mod: S$PBB,,, | Performed by: PODIATRIST

## 2022-02-08 PROCEDURE — 99214 PR OFFICE/OUTPT VISIT, EST, LEVL IV, 30-39 MIN: ICD-10-PCS | Mod: S$PBB,,, | Performed by: PODIATRIST

## 2022-02-08 PROCEDURE — 99999 PR PBB SHADOW E&M-EST. PATIENT-LVL IV: CPT | Mod: PBBFAC,,, | Performed by: PODIATRIST

## 2022-02-08 PROCEDURE — 99999 PR PBB SHADOW E&M-EST. PATIENT-LVL IV: ICD-10-PCS | Mod: PBBFAC,,, | Performed by: PODIATRIST

## 2022-02-08 PROCEDURE — 99214 OFFICE O/P EST MOD 30 MIN: CPT | Mod: PBBFAC,PN | Performed by: PODIATRIST

## 2022-02-08 RX ORDER — MECLIZINE HYDROCHLORIDE 25 MG/1
TABLET ORAL
COMMUNITY
Start: 2022-01-24 | End: 2022-04-21 | Stop reason: SDUPTHER

## 2022-02-09 NOTE — PROGRESS NOTES
Subjective:       Patient ID: Elmer Cunha is a 66 y.o. male.    Chief Complaint:      Patient is complaining of right foot pain including the 5th digit and 1st digit right.  Foot Pain  Associated symptoms include arthralgias and joint swelling.   Nail Problem  Associated symptoms include arthralgias and joint swelling.   Follow-up  Associated symptoms include arthralgias and joint swelling.     Review of Systems   Musculoskeletal: Positive for arthralgias, gait problem and joint swelling.   All other systems reviewed and are negative.      Objective:      Physical Exam  Vitals and nursing note reviewed.   Constitutional:       Appearance: He is well-developed.   Cardiovascular:      Rate and Rhythm: Normal rate and regular rhythm.      Pulses:           Dorsalis pedis pulses are 1+ on the right side and 1+ on the left side.        Posterior tibial pulses are 1+ on the right side and 1+ on the left side.      Heart sounds: Normal heart sounds.   Pulmonary:      Effort: Pulmonary effort is normal.      Breath sounds: Normal breath sounds.   Musculoskeletal:         General: Tenderness present.      Right foot: Decreased range of motion.                   Feet:      Right foot:      Protective Sensation: 4 sites tested. 4 sites sensed.      Skin integrity: Erythema and warmth present.      Left foot:      Protective Sensation: 4 sites tested. 4 sites sensed.   Skin:     General: Skin is warm.      Capillary Refill: Capillary refill takes 2 to 3 seconds.   Neurological:      Mental Status: He is alert.   Psychiatric:         Behavior: Behavior normal.         Thought Content: Thought content normal.         Judgment: Judgment normal.                   Assessment:       1. Ingrown nail    2. Ulcer of right foot, limited to breakdown of skin    3. Type 2 diabetes mellitus without complication, without long-term current use of insulin    4. Comprehensive diabetic foot examination, type 2 DM, encounter for    5.  Osteoarthritis of right ankle and foot    6. Unsteady gait        Plan:                Patient presents today for follow-up diabetic evaluation.  Patient is complaining about a painful right great toe where he has a pre ulcerative hyperkeratotic lesion as well as a 5th digit right that is contracted.  Patient states he does want to pursue surgery at the beginning of the new year to address the 5th digit right and remove the bone spurs off of the right great toe similar to the procedure that was performed on the left foot.  Areas of hyperkeratotic tissue pre ulcerative areas were non excisionally debrided today which gave the patient considerable relief he also had several ingrowing toenails today that were debrided patient states he did have a scary situation this morning he was transported to the emergency room of the hospital with significantly elevated blood pressure he states he is doing much better now but he was not feeling very good at the time.  Surgical consultation provided patient understands what is involved to address the contracted 5th digit right and the bone spurs on the right great toe plan follow-up will be as needed patient advised to contact us with any problems questions or concerns he stated these areas felt much better following debridement.  Patient states he has been having a lot of back problems back pain difficulty getting around.  I did non excisionally extensively debride the areas on the 5th digit right hand 1st digit right it has been several months since I have seen the patient last and these areas have become progressively thick and more painful in addition to the patient's chronically ingrowing toenails.  Total time including discussion evaluation discussion of treatment plan treatment options discussion of surgery removal of ingrowing toenails and wound debridement equaled 30 minutes.  This note was created using [employer] voice recognition software that occasionally misinterpreted  phrases or words.

## 2022-03-07 ENCOUNTER — LAB VISIT (OUTPATIENT)
Dept: LAB | Facility: HOSPITAL | Age: 67
End: 2022-03-07
Attending: INTERNAL MEDICINE
Payer: MEDICARE

## 2022-03-07 DIAGNOSIS — Z95.0 PACEMAKER: ICD-10-CM

## 2022-03-07 DIAGNOSIS — K76.0 FATTY LIVER: ICD-10-CM

## 2022-03-07 DIAGNOSIS — R42 DIZZINESS: ICD-10-CM

## 2022-03-07 DIAGNOSIS — R73.9 HYPERGLYCEMIA: ICD-10-CM

## 2022-03-07 DIAGNOSIS — M62.89 MUSCLE TIGHTNESS: ICD-10-CM

## 2022-03-07 DIAGNOSIS — K21.00 GASTROESOPHAGEAL REFLUX DISEASE WITH ESOPHAGITIS, UNSPECIFIED WHETHER HEMORRHAGE: ICD-10-CM

## 2022-03-07 DIAGNOSIS — M51.36 DDD (DEGENERATIVE DISC DISEASE), LUMBAR: ICD-10-CM

## 2022-03-07 DIAGNOSIS — E11.9 TYPE 2 DIABETES MELLITUS WITHOUT COMPLICATION, WITHOUT LONG-TERM CURRENT USE OF INSULIN: ICD-10-CM

## 2022-03-07 DIAGNOSIS — E78.00 HYPERCHOLESTEREMIA: ICD-10-CM

## 2022-03-07 DIAGNOSIS — I10 ESSENTIAL HYPERTENSION: ICD-10-CM

## 2022-03-07 DIAGNOSIS — Z12.5 ENCOUNTER FOR SCREENING FOR MALIGNANT NEOPLASM OF PROSTATE: ICD-10-CM

## 2022-03-07 DIAGNOSIS — Z79.899 ENCOUNTER FOR LONG-TERM CURRENT USE OF MEDICATION: ICD-10-CM

## 2022-03-07 DIAGNOSIS — Z86.73 HISTORY OF TIA (TRANSIENT ISCHEMIC ATTACK): ICD-10-CM

## 2022-03-07 DIAGNOSIS — K21.00 GASTROESOPHAGEAL REFLUX DISEASE WITH ESOPHAGITIS WITHOUT HEMORRHAGE: ICD-10-CM

## 2022-03-07 DIAGNOSIS — I67.9 SMALL VESSEL DISEASE, CEREBROVASCULAR: ICD-10-CM

## 2022-03-07 LAB
ALBUMIN SERPL BCP-MCNC: 3.8 G/DL (ref 3.5–5.2)
ALP SERPL-CCNC: 184 U/L (ref 55–135)
ALT SERPL W/O P-5'-P-CCNC: 32 U/L (ref 10–44)
ANION GAP SERPL CALC-SCNC: 9 MMOL/L (ref 8–16)
AST SERPL-CCNC: 15 U/L (ref 10–40)
BASOPHILS # BLD AUTO: 0.03 K/UL (ref 0–0.2)
BASOPHILS NFR BLD: 0.3 % (ref 0–1.9)
BILIRUB SERPL-MCNC: 1.5 MG/DL (ref 0.1–1)
BUN SERPL-MCNC: 15 MG/DL (ref 8–23)
C PEPTIDE SERPL-MCNC: 8.63 NG/ML (ref 0.78–5.19)
CALCIUM SERPL-MCNC: 9.9 MG/DL (ref 8.7–10.5)
CHLORIDE SERPL-SCNC: 105 MMOL/L (ref 95–110)
CHOLEST SERPL-MCNC: 177 MG/DL (ref 120–199)
CHOLEST/HDLC SERPL: 4.1 {RATIO} (ref 2–5)
CO2 SERPL-SCNC: 23 MMOL/L (ref 23–29)
CREAT SERPL-MCNC: 0.9 MG/DL (ref 0.5–1.4)
DIFFERENTIAL METHOD: ABNORMAL
EOSINOPHIL # BLD AUTO: 0 K/UL (ref 0–0.5)
EOSINOPHIL NFR BLD: 0.4 % (ref 0–8)
ERYTHROCYTE [DISTWIDTH] IN BLOOD BY AUTOMATED COUNT: 12.7 % (ref 11.5–14.5)
EST. GFR  (AFRICAN AMERICAN): >60 ML/MIN/1.73 M^2
EST. GFR  (NON AFRICAN AMERICAN): >60 ML/MIN/1.73 M^2
ESTIMATED AVG GLUCOSE: 180 MG/DL (ref 68–131)
GLUCOSE SERPL-MCNC: 170 MG/DL (ref 70–110)
HBA1C MFR BLD: 7.9 % (ref 4–5.6)
HCT VFR BLD AUTO: 45.3 % (ref 40–54)
HDLC SERPL-MCNC: 43 MG/DL (ref 40–75)
HDLC SERPL: 24.3 % (ref 20–50)
HGB BLD-MCNC: 15.9 G/DL (ref 14–18)
IMM GRANULOCYTES # BLD AUTO: 0.03 K/UL (ref 0–0.04)
IMM GRANULOCYTES NFR BLD AUTO: 0.3 % (ref 0–0.5)
LDLC SERPL CALC-MCNC: 101 MG/DL (ref 63–159)
LYMPHOCYTES # BLD AUTO: 1.3 K/UL (ref 1–4.8)
LYMPHOCYTES NFR BLD: 14.2 % (ref 18–48)
MAGNESIUM SERPL-MCNC: 1.9 MG/DL (ref 1.6–2.6)
MCH RBC QN AUTO: 29.4 PG (ref 27–31)
MCHC RBC AUTO-ENTMCNC: 35.1 G/DL (ref 32–36)
MCV RBC AUTO: 84 FL (ref 82–98)
MONOCYTES # BLD AUTO: 0.8 K/UL (ref 0.3–1)
MONOCYTES NFR BLD: 7.9 % (ref 4–15)
NEUTROPHILS # BLD AUTO: 7.3 K/UL (ref 1.8–7.7)
NEUTROPHILS NFR BLD: 76.9 % (ref 38–73)
NONHDLC SERPL-MCNC: 134 MG/DL
NRBC BLD-RTO: 0 /100 WBC
PLATELET # BLD AUTO: 209 K/UL (ref 150–450)
PMV BLD AUTO: 10 FL (ref 9.2–12.9)
POTASSIUM SERPL-SCNC: 4.4 MMOL/L (ref 3.5–5.1)
PROT SERPL-MCNC: 6.6 G/DL (ref 6–8.4)
RBC # BLD AUTO: 5.41 M/UL (ref 4.6–6.2)
SODIUM SERPL-SCNC: 137 MMOL/L (ref 136–145)
T4 SERPL-MCNC: 8.2 UG/DL (ref 4.5–11.5)
T4 SERPL-MCNC: 8.2 UG/DL (ref 4.5–11.5)
TRIGL SERPL-MCNC: 165 MG/DL (ref 30–150)
TSH SERPL DL<=0.005 MIU/L-ACNC: 1.33 UIU/ML (ref 0.4–4)
URATE SERPL-MCNC: 4.7 MG/DL (ref 3.4–7)
WBC # BLD AUTO: 9.46 K/UL (ref 3.9–12.7)

## 2022-03-07 PROCEDURE — 36415 COLL VENOUS BLD VENIPUNCTURE: CPT | Performed by: INTERNAL MEDICINE

## 2022-03-07 PROCEDURE — 83036 HEMOGLOBIN GLYCOSYLATED A1C: CPT | Performed by: INTERNAL MEDICINE

## 2022-03-07 PROCEDURE — 84443 ASSAY THYROID STIM HORMONE: CPT | Performed by: INTERNAL MEDICINE

## 2022-03-07 PROCEDURE — 80053 COMPREHEN METABOLIC PANEL: CPT | Performed by: INTERNAL MEDICINE

## 2022-03-07 PROCEDURE — 84681 ASSAY OF C-PEPTIDE: CPT | Performed by: INTERNAL MEDICINE

## 2022-03-07 PROCEDURE — 80061 LIPID PANEL: CPT | Performed by: INTERNAL MEDICINE

## 2022-03-07 PROCEDURE — 84550 ASSAY OF BLOOD/URIC ACID: CPT | Performed by: INTERNAL MEDICINE

## 2022-03-07 PROCEDURE — 85025 COMPLETE CBC W/AUTO DIFF WBC: CPT | Performed by: INTERNAL MEDICINE

## 2022-03-07 PROCEDURE — 84436 ASSAY OF TOTAL THYROXINE: CPT | Performed by: INTERNAL MEDICINE

## 2022-03-07 PROCEDURE — 83735 ASSAY OF MAGNESIUM: CPT | Performed by: INTERNAL MEDICINE

## 2022-04-21 ENCOUNTER — OFFICE VISIT (OUTPATIENT)
Dept: PODIATRY | Facility: CLINIC | Age: 67
End: 2022-04-21
Payer: MEDICARE

## 2022-04-21 VITALS
HEART RATE: 93 BPM | TEMPERATURE: 98 F | SYSTOLIC BLOOD PRESSURE: 132 MMHG | WEIGHT: 224 LBS | DIASTOLIC BLOOD PRESSURE: 73 MMHG | BODY MASS INDEX: 31.36 KG/M2 | HEIGHT: 71 IN

## 2022-04-21 DIAGNOSIS — L97.511 ULCER OF RIGHT FOOT, LIMITED TO BREAKDOWN OF SKIN: ICD-10-CM

## 2022-04-21 DIAGNOSIS — M20.41 HAMMER TOE OF RIGHT FOOT: ICD-10-CM

## 2022-04-21 DIAGNOSIS — E11.9 COMPREHENSIVE DIABETIC FOOT EXAMINATION, TYPE 2 DM, ENCOUNTER FOR: ICD-10-CM

## 2022-04-21 DIAGNOSIS — L60.0 INGROWN NAIL: ICD-10-CM

## 2022-04-21 DIAGNOSIS — E11.9 TYPE 2 DIABETES MELLITUS WITHOUT COMPLICATION, WITHOUT LONG-TERM CURRENT USE OF INSULIN: Primary | ICD-10-CM

## 2022-04-21 DIAGNOSIS — M25.562 CHRONIC PAIN OF BOTH KNEES: ICD-10-CM

## 2022-04-21 DIAGNOSIS — M25.561 CHRONIC PAIN OF BOTH KNEES: ICD-10-CM

## 2022-04-21 DIAGNOSIS — G89.29 CHRONIC PAIN OF BOTH KNEES: ICD-10-CM

## 2022-04-21 PROCEDURE — 99213 PR OFFICE/OUTPT VISIT, EST, LEVL III, 20-29 MIN: ICD-10-PCS | Mod: S$PBB,,, | Performed by: PODIATRIST

## 2022-04-21 PROCEDURE — 99214 OFFICE O/P EST MOD 30 MIN: CPT | Mod: PBBFAC,PN | Performed by: PODIATRIST

## 2022-04-21 PROCEDURE — 99999 PR PBB SHADOW E&M-EST. PATIENT-LVL IV: ICD-10-PCS | Mod: PBBFAC,,, | Performed by: PODIATRIST

## 2022-04-21 PROCEDURE — 99213 OFFICE O/P EST LOW 20 MIN: CPT | Mod: S$PBB,,, | Performed by: PODIATRIST

## 2022-04-21 PROCEDURE — 99999 PR PBB SHADOW E&M-EST. PATIENT-LVL IV: CPT | Mod: PBBFAC,,, | Performed by: PODIATRIST

## 2022-04-21 RX ORDER — FLUTICASONE PROPIONATE 50 MCG
SPRAY, SUSPENSION (ML) NASAL
COMMUNITY
Start: 2021-12-29

## 2022-04-21 RX ORDER — DICLOFENAC SODIUM 10 MG/G
GEL TOPICAL
COMMUNITY
Start: 2022-04-12

## 2022-04-21 RX ORDER — CLONIDINE HYDROCHLORIDE 0.1 MG/1
TABLET ORAL
COMMUNITY
Start: 2022-01-08 | End: 2022-08-02 | Stop reason: SDUPTHER

## 2022-04-21 RX ORDER — AZELASTINE 1 MG/ML
SPRAY, METERED NASAL
COMMUNITY
Start: 2021-12-29

## 2022-04-24 NOTE — PROGRESS NOTES
Subjective:       Patient ID: Elmer Cunha is a 66 y.o. male.    Chief Complaint:      Patient is complaining of right foot pain including the 5th digit and 1st digit right.  Follow-up  Associated symptoms include arthralgias and joint swelling.   Nail Problem  Associated symptoms include arthralgias and joint swelling.   Foot Pain  Associated symptoms include arthralgias and joint swelling.     Review of Systems   Musculoskeletal: Positive for arthralgias, gait problem and joint swelling.   All other systems reviewed and are negative.      Objective:      Physical Exam  Vitals and nursing note reviewed.   Constitutional:       Appearance: He is well-developed.   Cardiovascular:      Rate and Rhythm: Normal rate and regular rhythm.      Pulses:           Dorsalis pedis pulses are 1+ on the right side and 1+ on the left side.        Posterior tibial pulses are 1+ on the right side and 1+ on the left side.      Heart sounds: Normal heart sounds.   Pulmonary:      Effort: Pulmonary effort is normal.      Breath sounds: Normal breath sounds.   Musculoskeletal:         General: Tenderness present.      Right foot: Decreased range of motion.   Feet:      Right foot:      Protective Sensation: 4 sites tested. 4 sites sensed.      Skin integrity: Erythema and warmth present.      Left foot:      Protective Sensation: 4 sites tested. 4 sites sensed.   Skin:     General: Skin is warm.      Capillary Refill: Capillary refill takes 2 to 3 seconds.   Neurological:      Mental Status: He is alert.   Psychiatric:         Behavior: Behavior normal.         Thought Content: Thought content normal.         Judgment: Judgment normal.                                       Assessment:       1. Type 2 diabetes mellitus without complication, without long-term current use of insulin    2. Comprehensive diabetic foot examination, type 2 DM, encounter for    3. Ulcer of right foot, limited to breakdown of skin    4. Hammer toe of right  foot    5. Chronic pain of both knees    6. Ingrown nail        Plan:                Patient presents today for follow-up diabetic evaluation.  Patient is complaining about a painful right great toe where he has a pre ulcerative hyperkeratotic lesion as well as a 5th digit right that is contracted.  Patient states he does want to pursue surgery at the beginning of the new year to address the 5th digit right and remove the bone spurs off of the right great toe similar to the procedure that was performed on the left foot.  Areas of hyperkeratotic tissue pre ulcerative areas were non excisionally debrided today which gave the patient considerable relief he also had several ingrowing toenails today that were debrided patient states he did have a scary situation this morning he was transported to the emergency room of the hospital with significantly elevated blood pressure he states he is doing much better now but he was not feeling very good at the time.  Surgical consultation provided patient understands what is involved to address the contracted 5th digit right and the bone spurs on the right great toe plan follow-up will be as needed patient advised to contact us with any problems questions or concerns he stated these areas felt much better following debridement.  Patient states he has been having a lot of back problems back pain difficulty getting around.  I did non excisionally extensively debride the areas on the 5th digit right hand 1st digit right it has been several months since I have seen the patient last and these areas have become progressively thick and more painful in addition to the patient's chronically ingrowing toenails.  Total time including discussion evaluation discussion of treatment plan treatment options discussion of surgery removal of ingrowing toenails and wound debridement equaled 30 minutes.  This note was created using [employer] voice recognition software that occasionally misinterpreted  phrases or words.

## 2022-04-30 ENCOUNTER — NURSE TRIAGE (OUTPATIENT)
Dept: ADMINISTRATIVE | Facility: CLINIC | Age: 67
End: 2022-04-30
Payer: MEDICARE

## 2022-04-30 NOTE — TELEPHONE ENCOUNTER
Spoke with patient states he has a mylogram test shceduled on Monday.  The test is ordered by Dr. Ryan with [facility]. Patient states he wants to know what he should expect.  Patient connected with Aleksey at [facility] X-ray department whom explained to him in detail what he can expect.  Patient has no further questions at this time.    Reason for Disposition   Question about upcoming scheduled test, no triage required and triager able to answer question    Protocols used: INFORMATION ONLY CALL-A-AH

## 2022-06-22 ENCOUNTER — LAB VISIT (OUTPATIENT)
Dept: LAB | Facility: HOSPITAL | Age: 67
End: 2022-06-22
Attending: INTERNAL MEDICINE
Payer: MEDICARE

## 2022-06-22 DIAGNOSIS — K59.00 CONSTIPATION, UNSPECIFIED CONSTIPATION TYPE: ICD-10-CM

## 2022-06-22 DIAGNOSIS — Z12.5 PROSTATE CANCER SCREENING: ICD-10-CM

## 2022-06-22 DIAGNOSIS — I48.20 CHRONIC ATRIAL FIBRILLATION: ICD-10-CM

## 2022-06-22 DIAGNOSIS — I67.9 SMALL VESSEL DISEASE, CEREBROVASCULAR: ICD-10-CM

## 2022-06-22 DIAGNOSIS — E11.9 TYPE 2 DIABETES MELLITUS WITHOUT COMPLICATION, WITHOUT LONG-TERM CURRENT USE OF INSULIN: ICD-10-CM

## 2022-06-22 DIAGNOSIS — M19.079 OSTEOARTHRITIS OF ANKLE AND FOOT, UNSPECIFIED LATERALITY: ICD-10-CM

## 2022-06-22 DIAGNOSIS — R17 TOTAL BILIRUBIN, ELEVATED: ICD-10-CM

## 2022-06-22 DIAGNOSIS — K76.9 CHRONIC NONALCOHOLIC LIVER DISEASE: ICD-10-CM

## 2022-06-22 DIAGNOSIS — Z79.899 ENCOUNTER FOR LONG-TERM CURRENT USE OF MEDICATION: ICD-10-CM

## 2022-06-22 DIAGNOSIS — K21.00 GASTROESOPHAGEAL REFLUX DISEASE WITH ESOPHAGITIS WITHOUT HEMORRHAGE: ICD-10-CM

## 2022-06-22 DIAGNOSIS — K21.00 GASTROESOPHAGEAL REFLUX DISEASE WITH ESOPHAGITIS, UNSPECIFIED WHETHER HEMORRHAGE: ICD-10-CM

## 2022-06-22 DIAGNOSIS — E78.00 HYPERCHOLESTEREMIA: ICD-10-CM

## 2022-06-22 LAB
ALBUMIN SERPL BCP-MCNC: 3.7 G/DL (ref 3.5–5.2)
ALP SERPL-CCNC: 169 U/L (ref 55–135)
ALT SERPL W/O P-5'-P-CCNC: 26 U/L (ref 10–44)
ANION GAP SERPL CALC-SCNC: 11 MMOL/L (ref 8–16)
AST SERPL-CCNC: 14 U/L (ref 10–40)
BASOPHILS # BLD AUTO: 0.03 K/UL (ref 0–0.2)
BASOPHILS NFR BLD: 0.3 % (ref 0–1.9)
BILIRUB SERPL-MCNC: 1.3 MG/DL (ref 0.1–1)
BUN SERPL-MCNC: 18 MG/DL (ref 8–23)
C PEPTIDE SERPL-MCNC: 7.2 NG/ML (ref 0.78–5.19)
CALCIUM SERPL-MCNC: 9.9 MG/DL (ref 8.7–10.5)
CHLORIDE SERPL-SCNC: 107 MMOL/L (ref 95–110)
CHOLEST SERPL-MCNC: 148 MG/DL (ref 120–199)
CHOLEST/HDLC SERPL: 3.9 {RATIO} (ref 2–5)
CO2 SERPL-SCNC: 24 MMOL/L (ref 23–29)
CREAT SERPL-MCNC: 1.1 MG/DL (ref 0.5–1.4)
DIFFERENTIAL METHOD: ABNORMAL
EOSINOPHIL # BLD AUTO: 0 K/UL (ref 0–0.5)
EOSINOPHIL NFR BLD: 0.4 % (ref 0–8)
ERYTHROCYTE [DISTWIDTH] IN BLOOD BY AUTOMATED COUNT: 12 % (ref 11.5–14.5)
EST. GFR  (AFRICAN AMERICAN): >60 ML/MIN/1.73 M^2
EST. GFR  (NON AFRICAN AMERICAN): >60 ML/MIN/1.73 M^2
ESTIMATED AVG GLUCOSE: 148 MG/DL (ref 68–131)
GLUCOSE SERPL-MCNC: 158 MG/DL (ref 70–110)
HBA1C MFR BLD: 6.8 % (ref 4–5.6)
HCT VFR BLD AUTO: 42.8 % (ref 40–54)
HDLC SERPL-MCNC: 38 MG/DL (ref 40–75)
HDLC SERPL: 25.7 % (ref 20–50)
HGB BLD-MCNC: 14.6 G/DL (ref 14–18)
IMM GRANULOCYTES # BLD AUTO: 0.02 K/UL (ref 0–0.04)
IMM GRANULOCYTES NFR BLD AUTO: 0.2 % (ref 0–0.5)
LDLC SERPL CALC-MCNC: 86.8 MG/DL (ref 63–159)
LYMPHOCYTES # BLD AUTO: 1 K/UL (ref 1–4.8)
LYMPHOCYTES NFR BLD: 8.4 % (ref 18–48)
MAGNESIUM SERPL-MCNC: 1.8 MG/DL (ref 1.6–2.6)
MCH RBC QN AUTO: 29.1 PG (ref 27–31)
MCHC RBC AUTO-ENTMCNC: 34.1 G/DL (ref 32–36)
MCV RBC AUTO: 85 FL (ref 82–98)
MONOCYTES # BLD AUTO: 1 K/UL (ref 0.3–1)
MONOCYTES NFR BLD: 8.5 % (ref 4–15)
NEUTROPHILS # BLD AUTO: 9.3 K/UL (ref 1.8–7.7)
NEUTROPHILS NFR BLD: 82.2 % (ref 38–73)
NONHDLC SERPL-MCNC: 110 MG/DL
NRBC BLD-RTO: 0 /100 WBC
PLATELET # BLD AUTO: 192 K/UL (ref 150–450)
PMV BLD AUTO: 10.7 FL (ref 9.2–12.9)
POTASSIUM SERPL-SCNC: 3.8 MMOL/L (ref 3.5–5.1)
PROT SERPL-MCNC: 6.3 G/DL (ref 6–8.4)
RBC # BLD AUTO: 5.02 M/UL (ref 4.6–6.2)
SODIUM SERPL-SCNC: 142 MMOL/L (ref 136–145)
T4 SERPL-MCNC: 7.6 UG/DL (ref 4.5–11.5)
TRIGL SERPL-MCNC: 116 MG/DL (ref 30–150)
TSH SERPL DL<=0.005 MIU/L-ACNC: 0.83 UIU/ML (ref 0.4–4)
WBC # BLD AUTO: 11.25 K/UL (ref 3.9–12.7)

## 2022-06-22 PROCEDURE — 84436 ASSAY OF TOTAL THYROXINE: CPT | Performed by: INTERNAL MEDICINE

## 2022-06-22 PROCEDURE — 83036 HEMOGLOBIN GLYCOSYLATED A1C: CPT | Performed by: INTERNAL MEDICINE

## 2022-06-22 PROCEDURE — 84681 ASSAY OF C-PEPTIDE: CPT | Performed by: INTERNAL MEDICINE

## 2022-06-22 PROCEDURE — 85025 COMPLETE CBC W/AUTO DIFF WBC: CPT | Performed by: INTERNAL MEDICINE

## 2022-06-22 PROCEDURE — 84153 ASSAY OF PSA TOTAL: CPT | Performed by: INTERNAL MEDICINE

## 2022-06-22 PROCEDURE — 80053 COMPREHEN METABOLIC PANEL: CPT | Performed by: INTERNAL MEDICINE

## 2022-06-22 PROCEDURE — 83735 ASSAY OF MAGNESIUM: CPT | Performed by: INTERNAL MEDICINE

## 2022-06-22 PROCEDURE — 80061 LIPID PANEL: CPT | Performed by: INTERNAL MEDICINE

## 2022-06-22 PROCEDURE — 36415 COLL VENOUS BLD VENIPUNCTURE: CPT | Performed by: INTERNAL MEDICINE

## 2022-06-22 PROCEDURE — 84443 ASSAY THYROID STIM HORMONE: CPT | Performed by: INTERNAL MEDICINE

## 2022-06-23 LAB — COMPLEXED PSA SERPL-MCNC: 2.4 NG/ML (ref 0–4)

## 2022-07-27 ENCOUNTER — TELEPHONE (OUTPATIENT)
Dept: PODIATRY | Facility: CLINIC | Age: 67
End: 2022-07-27
Payer: MEDICARE

## 2022-07-27 NOTE — TELEPHONE ENCOUNTER
----- Message from Kyara Donahue sent at 7/27/2022 11:17 AM CDT -----  Contact: Patient  Type:  Sooner Apoointment Request    Caller is requesting a sooner appointment.  Caller declined first available appointment listed below.  Caller will not accept being placed on the waitlist and is requesting a message be sent to doctor.    Name of Caller: Patient    When is the first available appointment? 08/09    Symptoms: needing toe nails cut     Would the patient rather a call back or a response via [facility]?  Call    Best Call Back Number: [phone] (home)     Additional Information:

## 2022-08-02 ENCOUNTER — OFFICE VISIT (OUTPATIENT)
Dept: PODIATRY | Facility: CLINIC | Age: 67
End: 2022-08-02
Payer: MEDICARE

## 2022-08-02 VITALS
HEIGHT: 71 IN | BODY MASS INDEX: 30.1 KG/M2 | SYSTOLIC BLOOD PRESSURE: 123 MMHG | DIASTOLIC BLOOD PRESSURE: 68 MMHG | TEMPERATURE: 98 F | WEIGHT: 215 LBS | HEART RATE: 88 BPM

## 2022-08-02 DIAGNOSIS — E11.9 TYPE 2 DIABETES MELLITUS WITHOUT COMPLICATION, WITHOUT LONG-TERM CURRENT USE OF INSULIN: Primary | ICD-10-CM

## 2022-08-02 DIAGNOSIS — L97.511 ULCER OF RIGHT FOOT, LIMITED TO BREAKDOWN OF SKIN: ICD-10-CM

## 2022-08-02 DIAGNOSIS — M19.071 OSTEOARTHRITIS OF RIGHT ANKLE AND FOOT: ICD-10-CM

## 2022-08-02 DIAGNOSIS — E11.9 COMPREHENSIVE DIABETIC FOOT EXAMINATION, TYPE 2 DM, ENCOUNTER FOR: ICD-10-CM

## 2022-08-02 DIAGNOSIS — M20.41 HAMMER TOE OF RIGHT FOOT: ICD-10-CM

## 2022-08-02 PROCEDURE — 99999 PR PBB SHADOW E&M-EST. PATIENT-LVL IV: CPT | Mod: PBBFAC,,, | Performed by: PODIATRIST

## 2022-08-02 PROCEDURE — 99213 PR OFFICE/OUTPT VISIT, EST, LEVL III, 20-29 MIN: ICD-10-PCS | Mod: S$PBB,,, | Performed by: PODIATRIST

## 2022-08-02 PROCEDURE — 99214 OFFICE O/P EST MOD 30 MIN: CPT | Mod: PBBFAC,PN | Performed by: PODIATRIST

## 2022-08-02 PROCEDURE — 99999 PR PBB SHADOW E&M-EST. PATIENT-LVL IV: ICD-10-PCS | Mod: PBBFAC,,, | Performed by: PODIATRIST

## 2022-08-02 PROCEDURE — 99213 OFFICE O/P EST LOW 20 MIN: CPT | Mod: S$PBB,,, | Performed by: PODIATRIST

## 2022-08-02 RX ORDER — CLONIDINE HYDROCHLORIDE 0.1 MG/1
0.1 TABLET ORAL
COMMUNITY
Start: 2022-07-06 | End: 2023-08-20

## 2022-08-02 RX ORDER — MECLIZINE HYDROCHLORIDE 25 MG/1
TABLET ORAL
COMMUNITY
Start: 2022-07-13

## 2022-08-02 RX ORDER — ATORVASTATIN CALCIUM 40 MG/1
TABLET, FILM COATED ORAL
COMMUNITY
Start: 2022-07-06

## 2022-08-02 RX ORDER — FLUOXETINE 10 MG/1
10 CAPSULE ORAL DAILY
COMMUNITY
Start: 2022-06-25 | End: 2023-05-22 | Stop reason: SDUPTHER

## 2022-08-03 PROBLEM — M79.672 LEFT FOOT PAIN: Status: RESOLVED | Noted: 2020-04-20 | Resolved: 2022-08-03

## 2022-08-03 NOTE — PROGRESS NOTES
Subjective:       Patient ID: Elmer Cunha is a 66 y.o. male.    Chief Complaint:      Patient is complaining of right foot pain including the 5th digit and 1st digit right.  Follow-up  Associated symptoms include arthralgias and joint swelling.   Nail Problem  Associated symptoms include arthralgias and joint swelling.   Foot Pain  Associated symptoms include arthralgias and joint swelling.     Review of Systems   Musculoskeletal: Positive for arthralgias, gait problem and joint swelling.   All other systems reviewed and are negative.      Objective:      Physical Exam  Vitals and nursing note reviewed.   Constitutional:       Appearance: He is well-developed.   Cardiovascular:      Rate and Rhythm: Normal rate and regular rhythm.      Pulses:           Dorsalis pedis pulses are 1+ on the right side and 1+ on the left side.        Posterior tibial pulses are 1+ on the right side and 1+ on the left side.      Heart sounds: Normal heart sounds.   Pulmonary:      Effort: Pulmonary effort is normal.      Breath sounds: Normal breath sounds.   Musculoskeletal:         General: Tenderness present.      Right foot: Decreased range of motion.   Feet:      Right foot:      Protective Sensation: 4 sites tested. 4 sites sensed.      Skin integrity: Erythema and warmth present.      Left foot:      Protective Sensation: 4 sites tested. 4 sites sensed.   Skin:     General: Skin is warm.      Capillary Refill: Capillary refill takes 2 to 3 seconds.   Neurological:      Mental Status: He is alert.   Psychiatric:         Behavior: Behavior normal.         Thought Content: Thought content normal.         Judgment: Judgment normal.                                                   Assessment:       1. Type 2 diabetes mellitus without complication, without long-term current use of insulin    2. Comprehensive diabetic foot examination, type 2 DM, encounter for    3. Osteoarthritis of right ankle and foot    4. Hammer toe of right  foot    5. Ulcer of right foot, limited to breakdown of skin        Plan:                Patient presents today for follow-up diabetic evaluation.  Patient is complaining about a painful right great toe where he has a pre ulcerative hyperkeratotic lesion as well as a 5th digit right that is contracted.  Patient states he does want to pursue surgery at the beginning of the new year to address the 5th digit right and remove the bone spurs off of the right great toe similar to the procedure that was performed on the left foot.  Areas of hyperkeratotic tissue pre ulcerative areas were non excisionally debrided today which gave the patient considerable relief he also had several ingrowing toenails today that were debrided patient states he did have a scary situation this morning he was transported to the emergency room of the hospital with significantly elevated blood pressure he states he is doing much better now but he was not feeling very good at the time.  Surgical consultation provided patient understands what is involved to address the contracted 5th digit right and the bone spurs on the right great toe plan follow-up will be as needed patient advised to contact us with any problems questions or concerns he stated these areas felt much better following debridement.  Patient states he has been having a lot of back problems back pain difficulty getting around.  I did non excisionally extensively debride the areas on the 5th digit right hand 1st digit right it has been several months since I have seen the patient last and these areas have become progressively thick and more painful in addition to the patient's chronically ingrowing toenails.    Patient states he is going through a series of injection in his back he states he has to do this in order to have back surgery that his primary concern right now is his back he states he is completely pain-free in the left foot following previous surgery on the left foot at  some point he would like to have the contractures and the bone spurs addressed on the right but his back is his primary concern.  We will continue to monitor these areas and keep the patient comfortable so that he can continue to ambulate with limited pain. This note was created using [employer] voice recognition software that occasionally misinterpreted phrases or words.

## 2022-09-19 ENCOUNTER — LAB VISIT (OUTPATIENT)
Dept: LAB | Facility: HOSPITAL | Age: 67
End: 2022-09-19
Attending: INTERNAL MEDICINE
Payer: MEDICARE

## 2022-09-19 DIAGNOSIS — E11.9 TYPE 2 DIABETES MELLITUS WITHOUT COMPLICATION, WITHOUT LONG-TERM CURRENT USE OF INSULIN: ICD-10-CM

## 2022-09-19 DIAGNOSIS — K21.00 GASTROESOPHAGEAL REFLUX DISEASE WITH ESOPHAGITIS WITHOUT HEMORRHAGE: ICD-10-CM

## 2022-09-19 DIAGNOSIS — E78.00 HYPERCHOLESTEREMIA: ICD-10-CM

## 2022-09-19 DIAGNOSIS — I10 ESSENTIAL HYPERTENSION: ICD-10-CM

## 2022-09-19 DIAGNOSIS — Z79.899 ENCOUNTER FOR LONG-TERM CURRENT USE OF MEDICATION: ICD-10-CM

## 2022-09-19 LAB
ALBUMIN SERPL BCP-MCNC: 3.8 G/DL (ref 3.5–5.2)
ALP SERPL-CCNC: 201 U/L (ref 55–135)
ALT SERPL W/O P-5'-P-CCNC: 28 U/L (ref 10–44)
ANION GAP SERPL CALC-SCNC: 11 MMOL/L (ref 8–16)
AST SERPL-CCNC: 16 U/L (ref 10–40)
BASOPHILS # BLD AUTO: 0.02 K/UL (ref 0–0.2)
BASOPHILS NFR BLD: 0.3 % (ref 0–1.9)
BILIRUB SERPL-MCNC: 1.2 MG/DL (ref 0.1–1)
BNP SERPL-MCNC: 19 PG/ML (ref 0–99)
BUN SERPL-MCNC: 12 MG/DL (ref 8–23)
CALCIUM SERPL-MCNC: 9.7 MG/DL (ref 8.7–10.5)
CHLORIDE SERPL-SCNC: 106 MMOL/L (ref 95–110)
CHOLEST SERPL-MCNC: 153 MG/DL (ref 120–199)
CHOLEST/HDLC SERPL: 4.3 {RATIO} (ref 2–5)
CO2 SERPL-SCNC: 25 MMOL/L (ref 23–29)
CREAT SERPL-MCNC: 0.8 MG/DL (ref 0.5–1.4)
DIFFERENTIAL METHOD: ABNORMAL
EOSINOPHIL # BLD AUTO: 0.1 K/UL (ref 0–0.5)
EOSINOPHIL NFR BLD: 1.2 % (ref 0–8)
ERYTHROCYTE [DISTWIDTH] IN BLOOD BY AUTOMATED COUNT: 13 % (ref 11.5–14.5)
EST. GFR  (NO RACE VARIABLE): >60 ML/MIN/1.73 M^2
ESTIMATED AVG GLUCOSE: 146 MG/DL (ref 68–131)
GLUCOSE SERPL-MCNC: 114 MG/DL (ref 70–110)
HBA1C MFR BLD: 6.7 % (ref 4–5.6)
HCT VFR BLD AUTO: 41.7 % (ref 40–54)
HDLC SERPL-MCNC: 36 MG/DL (ref 40–75)
HDLC SERPL: 23.5 % (ref 20–50)
HGB BLD-MCNC: 14.1 G/DL (ref 14–18)
IMM GRANULOCYTES # BLD AUTO: 0.01 K/UL (ref 0–0.04)
IMM GRANULOCYTES NFR BLD AUTO: 0.1 % (ref 0–0.5)
LDLC SERPL CALC-MCNC: 79.2 MG/DL (ref 63–159)
LYMPHOCYTES # BLD AUTO: 1.2 K/UL (ref 1–4.8)
LYMPHOCYTES NFR BLD: 16.8 % (ref 18–48)
MAGNESIUM SERPL-MCNC: 1.8 MG/DL (ref 1.6–2.6)
MCH RBC QN AUTO: 28.6 PG (ref 27–31)
MCHC RBC AUTO-ENTMCNC: 33.8 G/DL (ref 32–36)
MCV RBC AUTO: 85 FL (ref 82–98)
MONOCYTES # BLD AUTO: 0.8 K/UL (ref 0.3–1)
MONOCYTES NFR BLD: 10.4 % (ref 4–15)
NEUTROPHILS # BLD AUTO: 5.3 K/UL (ref 1.8–7.7)
NEUTROPHILS NFR BLD: 71.2 % (ref 38–73)
NONHDLC SERPL-MCNC: 117 MG/DL
NRBC BLD-RTO: 0 /100 WBC
PLATELET # BLD AUTO: 189 K/UL (ref 150–450)
PMV BLD AUTO: 10.3 FL (ref 9.2–12.9)
POTASSIUM SERPL-SCNC: 4.1 MMOL/L (ref 3.5–5.1)
PROT SERPL-MCNC: 6.6 G/DL (ref 6–8.4)
RBC # BLD AUTO: 4.93 M/UL (ref 4.6–6.2)
SODIUM SERPL-SCNC: 142 MMOL/L (ref 136–145)
TRIGL SERPL-MCNC: 189 MG/DL (ref 30–150)
TSH SERPL DL<=0.005 MIU/L-ACNC: 0.91 UIU/ML (ref 0.4–4)
WBC # BLD AUTO: 7.4 K/UL (ref 3.9–12.7)

## 2022-09-19 PROCEDURE — 85025 COMPLETE CBC W/AUTO DIFF WBC: CPT | Performed by: INTERNAL MEDICINE

## 2022-09-19 PROCEDURE — 84436 ASSAY OF TOTAL THYROXINE: CPT | Performed by: INTERNAL MEDICINE

## 2022-09-19 PROCEDURE — 80061 LIPID PANEL: CPT | Performed by: INTERNAL MEDICINE

## 2022-09-19 PROCEDURE — 83735 ASSAY OF MAGNESIUM: CPT | Performed by: INTERNAL MEDICINE

## 2022-09-19 PROCEDURE — 84443 ASSAY THYROID STIM HORMONE: CPT | Performed by: INTERNAL MEDICINE

## 2022-09-19 PROCEDURE — 80053 COMPREHEN METABOLIC PANEL: CPT | Performed by: INTERNAL MEDICINE

## 2022-09-19 PROCEDURE — 83880 ASSAY OF NATRIURETIC PEPTIDE: CPT | Performed by: INTERNAL MEDICINE

## 2022-09-19 PROCEDURE — 36415 COLL VENOUS BLD VENIPUNCTURE: CPT | Performed by: INTERNAL MEDICINE

## 2022-09-19 PROCEDURE — 83036 HEMOGLOBIN GLYCOSYLATED A1C: CPT | Performed by: INTERNAL MEDICINE

## 2022-09-20 LAB — T4 SERPL-MCNC: 7.5 UG/DL (ref 4.5–11.5)

## 2022-09-22 ENCOUNTER — OFFICE VISIT (OUTPATIENT)
Dept: PODIATRY | Facility: CLINIC | Age: 67
End: 2022-09-22
Payer: MEDICARE

## 2022-09-22 VITALS
DIASTOLIC BLOOD PRESSURE: 81 MMHG | HEART RATE: 89 BPM | WEIGHT: 215 LBS | TEMPERATURE: 98 F | HEIGHT: 71 IN | BODY MASS INDEX: 30.1 KG/M2 | SYSTOLIC BLOOD PRESSURE: 138 MMHG

## 2022-09-22 DIAGNOSIS — L97.511 ULCER OF RIGHT FOOT, LIMITED TO BREAKDOWN OF SKIN: ICD-10-CM

## 2022-09-22 DIAGNOSIS — M19.071 OSTEOARTHRITIS OF RIGHT ANKLE AND FOOT: ICD-10-CM

## 2022-09-22 DIAGNOSIS — M20.41 HAMMER TOE OF RIGHT FOOT: Primary | ICD-10-CM

## 2022-09-22 PROCEDURE — 99999 PR PBB SHADOW E&M-EST. PATIENT-LVL IV: CPT | Mod: PBBFAC,,, | Performed by: PODIATRIST

## 2022-09-22 PROCEDURE — 99999 PR PBB SHADOW E&M-EST. PATIENT-LVL IV: ICD-10-PCS | Mod: PBBFAC,,, | Performed by: PODIATRIST

## 2022-09-22 PROCEDURE — 99213 PR OFFICE/OUTPT VISIT, EST, LEVL III, 20-29 MIN: ICD-10-PCS | Mod: S$PBB,,, | Performed by: PODIATRIST

## 2022-09-22 PROCEDURE — 99213 OFFICE O/P EST LOW 20 MIN: CPT | Mod: S$PBB,,, | Performed by: PODIATRIST

## 2022-09-22 PROCEDURE — 99214 OFFICE O/P EST MOD 30 MIN: CPT | Mod: PBBFAC,PN | Performed by: PODIATRIST

## 2022-09-25 NOTE — PROGRESS NOTES
Subjective:       Patient ID: Elmer Cunha is a 67 y.o. male.    Chief Complaint:      Patient is complaining of right foot pain including the 5th digit and 1st digit right.  Follow-up  Associated symptoms include arthralgias and joint swelling.   Nail Problem  Associated symptoms include arthralgias and joint swelling.   Foot Pain  Associated symptoms include arthralgias and joint swelling.   Review of Systems   Musculoskeletal:  Positive for arthralgias, gait problem and joint swelling.   All other systems reviewed and are negative.    Objective:      Physical Exam  Vitals and nursing note reviewed.   Constitutional:       Appearance: He is well-developed.   Cardiovascular:      Rate and Rhythm: Normal rate and regular rhythm.      Pulses:           Dorsalis pedis pulses are 1+ on the right side and 1+ on the left side.        Posterior tibial pulses are 1+ on the right side and 1+ on the left side.      Heart sounds: Normal heart sounds.   Pulmonary:      Effort: Pulmonary effort is normal.      Breath sounds: Normal breath sounds.   Musculoskeletal:         General: Tenderness present.      Right foot: Decreased range of motion.   Feet:      Right foot:      Protective Sensation: 4 sites tested.  4 sites sensed.      Skin integrity: Erythema and warmth present.      Left foot:      Protective Sensation: 4 sites tested.  4 sites sensed.   Skin:     General: Skin is warm.      Capillary Refill: Capillary refill takes 2 to 3 seconds.   Neurological:      Mental Status: He is alert.   Psychiatric:         Behavior: Behavior normal.         Thought Content: Thought content normal.         Judgment: Judgment normal.                                                       Assessment:       1. Hammer toe of right foot    2. Ulcer of right foot, limited to breakdown of skin    3. Osteoarthritis of right ankle and foot        Plan:                Patient presents today for follow-up diabetic evaluation.  Patient is  complaining about a painful right great toe where he has a pre ulcerative hyperkeratotic lesion as well as a 5th digit right that is contracted.  Patient states he does want to pursue surgery at the beginning of the new year to address the 5th digit right and remove the bone spurs off of the right great toe similar to the procedure that was performed on the left foot.  Areas of hyperkeratotic tissue pre ulcerative areas were non excisionally debrided today which gave the patient considerable relief he also had several ingrowing toenails today that were debrided patient states he did have a scary situation this morning he was transported to the emergency room of the hospital with significantly elevated blood pressure he states he is doing much better now but he was not feeling very good at the time.  Surgical consultation provided patient understands what is involved to address the contracted 5th digit right and the bone spurs on the right great toe plan follow-up will be as needed patient advised to contact us with any problems questions or concerns he stated these areas felt much better following debridement.  Patient states he has been having a lot of back problems back pain difficulty getting around.  I did non excisionally extensively debride the areas on the 5th digit right hand 1st digit right it has been several months since I have seen the patient last and these areas have become progressively thick and more painful in addition to the patient's chronically ingrowing toenails.    Patient states he is going through a series of injection in his back he states he has to do this in order to have back surgery that his primary concern right now is his back he states he is completely pain-free in the left foot following previous surgery on the left foot at some point he would like to have the contractures and the bone spurs addressed on the right but his back is his primary concern.  We will continue to monitor  these areas and keep the patient comfortable so that he can continue to ambulate with limited pain. This note was created using [employer] voice recognition software that occasionally misinterpreted phrases or words.

## 2022-10-27 ENCOUNTER — OFFICE VISIT (OUTPATIENT)
Dept: PODIATRY | Facility: CLINIC | Age: 67
End: 2022-10-27
Payer: MEDICARE

## 2022-10-27 VITALS
WEIGHT: 224 LBS | DIASTOLIC BLOOD PRESSURE: 83 MMHG | SYSTOLIC BLOOD PRESSURE: 130 MMHG | HEIGHT: 71 IN | TEMPERATURE: 98 F | BODY MASS INDEX: 31.36 KG/M2 | HEART RATE: 74 BPM

## 2022-10-27 DIAGNOSIS — E11.9 COMPREHENSIVE DIABETIC FOOT EXAMINATION, TYPE 2 DM, ENCOUNTER FOR: ICD-10-CM

## 2022-10-27 DIAGNOSIS — L97.511 ULCER OF RIGHT FOOT, LIMITED TO BREAKDOWN OF SKIN: ICD-10-CM

## 2022-10-27 DIAGNOSIS — L60.0 INGROWN NAIL: ICD-10-CM

## 2022-10-27 DIAGNOSIS — E11.9 TYPE 2 DIABETES MELLITUS WITHOUT COMPLICATION, WITHOUT LONG-TERM CURRENT USE OF INSULIN: Primary | ICD-10-CM

## 2022-10-27 DIAGNOSIS — M20.41 HAMMER TOE OF RIGHT FOOT: ICD-10-CM

## 2022-10-27 PROCEDURE — 99213 PR OFFICE/OUTPT VISIT, EST, LEVL III, 20-29 MIN: ICD-10-PCS | Mod: S$PBB,,, | Performed by: PODIATRIST

## 2022-10-27 PROCEDURE — 99214 OFFICE O/P EST MOD 30 MIN: CPT | Mod: PBBFAC,PN | Performed by: PODIATRIST

## 2022-10-27 PROCEDURE — 99999 PR PBB SHADOW E&M-EST. PATIENT-LVL IV: CPT | Mod: PBBFAC,,, | Performed by: PODIATRIST

## 2022-10-27 PROCEDURE — 99213 OFFICE O/P EST LOW 20 MIN: CPT | Mod: S$PBB,,, | Performed by: PODIATRIST

## 2022-10-27 PROCEDURE — 99999 PR PBB SHADOW E&M-EST. PATIENT-LVL IV: ICD-10-PCS | Mod: PBBFAC,,, | Performed by: PODIATRIST

## 2022-10-30 NOTE — PROGRESS NOTES
Subjective:       Patient ID: Elmer Cunha is a 67 y.o. male.    Chief Complaint:      Patient is complaining of right foot pain including the 5th digit and 1st digit right.  Follow-up  Associated symptoms include arthralgias and joint swelling.   Nail Problem  Associated symptoms include arthralgias and joint swelling.   Foot Pain  Associated symptoms include arthralgias and joint swelling.   Review of Systems   Musculoskeletal:  Positive for arthralgias, gait problem and joint swelling.   All other systems reviewed and are negative.    Objective:      Physical Exam  Vitals and nursing note reviewed.   Constitutional:       Appearance: He is well-developed.   Cardiovascular:      Rate and Rhythm: Normal rate and regular rhythm.      Pulses:           Dorsalis pedis pulses are 1+ on the right side and 1+ on the left side.        Posterior tibial pulses are 1+ on the right side and 1+ on the left side.      Heart sounds: Normal heart sounds.   Pulmonary:      Effort: Pulmonary effort is normal.      Breath sounds: Normal breath sounds.   Musculoskeletal:         General: Tenderness present.      Right foot: Decreased range of motion.   Feet:      Right foot:      Protective Sensation: 4 sites tested.  4 sites sensed.      Skin integrity: Erythema and warmth present.      Left foot:      Protective Sensation: 4 sites tested.  4 sites sensed.   Skin:     General: Skin is warm.      Capillary Refill: Capillary refill takes 2 to 3 seconds.   Neurological:      Mental Status: He is alert.   Psychiatric:         Behavior: Behavior normal.         Thought Content: Thought content normal.         Judgment: Judgment normal.                                                             Assessment:       1. Type 2 diabetes mellitus without complication, without long-term current use of insulin    2. Comprehensive diabetic foot examination, type 2 DM, encounter for    3. Ingrown nail    4. Ulcer of right foot, limited to  breakdown of skin    5. Hammer toe of right foot        Plan:                Patient presents today for follow-up diabetic evaluation.  Patient is complaining about a painful right great toe where he has a pre ulcerative hyperkeratotic lesion as well as a 5th digit right that is contracted.  Patient states he does want to pursue surgery at the beginning of the new year to address the 5th digit right and remove the bone spurs off of the right great toe similar to the procedure that was performed on the left foot.  Areas of hyperkeratotic tissue pre ulcerative areas were non excisionally debrided today which gave the patient considerable relief he also had several ingrowing toenails today that were debrided patient states he did have a scary situation this morning he was transported to the emergency room of the hospital with significantly elevated blood pressure he states he is doing much better now but he was not feeling very good at the time.  Surgical consultation provided patient understands what is involved to address the contracted 5th digit right and the bone spurs on the right great toe plan follow-up will be as needed patient advised to contact us with any problems questions or concerns he stated these areas felt much better following debridement.  Patient states he has been having a lot of back problems back pain difficulty getting around.  I did non excisionally extensively debride the areas on the 5th digit right hand 1st digit right it has been several months since I have seen the patient last and these areas have become progressively thick and more painful in addition to the patient's chronically ingrowing toenails.    Patient states he is going through a series of injection in his back he states he has to do this in order to have back surgery that his primary concern right now is his back he states he is completely pain-free in the left foot following previous surgery on the left foot at some point he  would like to have the contractures and the bone spurs addressed on the right but his back is his primary concern.  We will continue to monitor these areas and keep the patient comfortable so that he can continue to ambulate with limited pain. This note was created using [employer] voice recognition software that occasionally misinterpreted phrases or words.

## 2022-11-17 ENCOUNTER — TELEPHONE (OUTPATIENT)
Dept: PODIATRY | Facility: CLINIC | Age: 67
End: 2022-11-17
Payer: MEDICARE

## 2022-11-17 ENCOUNTER — OFFICE VISIT (OUTPATIENT)
Dept: PODIATRY | Facility: CLINIC | Age: 67
End: 2022-11-17
Payer: MEDICARE

## 2022-11-17 VITALS
HEART RATE: 94 BPM | BODY MASS INDEX: 31.36 KG/M2 | TEMPERATURE: 98 F | WEIGHT: 224 LBS | SYSTOLIC BLOOD PRESSURE: 144 MMHG | DIASTOLIC BLOOD PRESSURE: 89 MMHG | HEIGHT: 71 IN

## 2022-11-17 DIAGNOSIS — M19.042 OSTEOARTHRITIS OF BOTH HANDS, UNSPECIFIED OSTEOARTHRITIS TYPE: ICD-10-CM

## 2022-11-17 DIAGNOSIS — M19.041 OSTEOARTHRITIS OF BOTH HANDS, UNSPECIFIED OSTEOARTHRITIS TYPE: ICD-10-CM

## 2022-11-17 DIAGNOSIS — L97.511 ULCER OF RIGHT FOOT, LIMITED TO BREAKDOWN OF SKIN: ICD-10-CM

## 2022-11-17 DIAGNOSIS — M20.41 HAMMER TOE OF RIGHT FOOT: Primary | ICD-10-CM

## 2022-11-17 PROCEDURE — 99213 OFFICE O/P EST LOW 20 MIN: CPT | Mod: S$PBB,,, | Performed by: PODIATRIST

## 2022-11-17 PROCEDURE — 99999 PR PBB SHADOW E&M-EST. PATIENT-LVL III: CPT | Mod: PBBFAC,,, | Performed by: PODIATRIST

## 2022-11-17 PROCEDURE — 99213 PR OFFICE/OUTPT VISIT, EST, LEVL III, 20-29 MIN: ICD-10-PCS | Mod: S$PBB,,, | Performed by: PODIATRIST

## 2022-11-17 PROCEDURE — 99213 OFFICE O/P EST LOW 20 MIN: CPT | Mod: PBBFAC,PN | Performed by: PODIATRIST

## 2022-11-17 PROCEDURE — 99999 PR PBB SHADOW E&M-EST. PATIENT-LVL III: ICD-10-PCS | Mod: PBBFAC,,, | Performed by: PODIATRIST

## 2022-11-17 RX ORDER — LANCETS
EACH MISCELLANEOUS
COMMUNITY
Start: 2022-10-25 | End: 2023-08-28

## 2022-11-17 NOTE — TELEPHONE ENCOUNTER
Scheduled same day appointment for patient----- Message from Shiela Ervin, Patient Care Assistant sent at 11/17/2022 11:16 AM CST -----  Contact: self  Type: Needs Medical Advice  Who Called:  self  Symptoms (please be specific):  Pain callous  Pharmacy name and phone #:    Best Call Back Number: [phone]  Additional Information: patient would like to be sooner if possible, he says his foot hurts pretty bad.

## 2022-11-20 NOTE — PROGRESS NOTES
Subjective:       Patient ID: Elmer Cunha is a 67 y.o. male.    Chief Complaint:      Patient is complaining of right foot pain including the 5th digit and 1st digit right.  Follow-up  Associated symptoms include arthralgias and joint swelling.   Nail Problem  Associated symptoms include arthralgias and joint swelling.   Foot Pain  Associated symptoms include arthralgias and joint swelling.   Review of Systems   Musculoskeletal:  Positive for arthralgias, gait problem and joint swelling.   All other systems reviewed and are negative.    Objective:      Physical Exam  Vitals and nursing note reviewed.   Constitutional:       Appearance: He is well-developed.   Cardiovascular:      Rate and Rhythm: Normal rate and regular rhythm.      Pulses:           Dorsalis pedis pulses are 1+ on the right side and 1+ on the left side.        Posterior tibial pulses are 1+ on the right side and 1+ on the left side.      Heart sounds: Normal heart sounds.   Pulmonary:      Effort: Pulmonary effort is normal.      Breath sounds: Normal breath sounds.   Musculoskeletal:         General: Tenderness present.      Right foot: Decreased range of motion.   Feet:      Right foot:      Protective Sensation: 4 sites tested.  4 sites sensed.      Skin integrity: Erythema and warmth present.      Left foot:      Protective Sensation: 4 sites tested.  4 sites sensed.   Skin:     General: Skin is warm.      Capillary Refill: Capillary refill takes 2 to 3 seconds.   Neurological:      Mental Status: He is alert.   Psychiatric:         Behavior: Behavior normal.         Thought Content: Thought content normal.         Judgment: Judgment normal.                                                                       Assessment:       1. Hammer toe of right foot    2. Ulcer of right foot, limited to breakdown of skin    3. Osteoarthritis of both hands, unspecified osteoarthritis type        Plan:                Patient presents today for follow-up  diabetic evaluation.  Patient is complaining about a painful right great toe where he has a pre ulcerative hyperkeratotic lesion as well as a 5th digit right that is contracted.  Patient states he does want to pursue surgery at the beginning of the new year to address the 5th digit right and remove the bone spurs off of the right great toe similar to the procedure that was performed on the left foot.  Areas of hyperkeratotic tissue pre ulcerative areas were non excisionally debrided today which gave the patient considerable relief he also had several ingrowing toenails today that were debrided patient states he did have a scary situation this morning he was transported to the emergency room of the hospital with significantly elevated blood pressure he states he is doing much better now but he was not feeling very good at the time.  Surgical consultation provided patient understands what is involved to address the contracted 5th digit right and the bone spurs on the right great toe plan follow-up will be as needed patient advised to contact us with any problems questions or concerns he stated these areas felt much better following debridement.  Patient states he has been having a lot of back problems back pain difficulty getting around.  I did non excisionally extensively debride the areas on the 5th digit right hand 1st digit right it has been several months since I have seen the patient last and these areas have become progressively thick and more painful in addition to the patient's chronically ingrowing toenails.    Patient states he is going through a series of injection in his back he states he has to do this in order to have back surgery that his primary concern right now is his back he states he is completely pain-free in the left foot following previous surgery on the left foot at some point he would like to have the contractures and the bone spurs addressed on the right but his back is his primary concern.   We will continue to monitor these areas and keep the patient comfortable so that he can continue to ambulate with limited pain.  Patient indicates the 5th digit right is getting worse especially with the burning sensation that he has been experiencing and he would like to get in to have surgery on this sooner rather than later.  This note was created using [email or web address] voice recognition software that occasionally misinterpreted phrases or words.

## 2022-11-28 ENCOUNTER — LAB VISIT (OUTPATIENT)
Dept: LAB | Facility: HOSPITAL | Age: 67
End: 2022-11-28
Payer: MEDICARE

## 2022-11-28 DIAGNOSIS — I10 ESSENTIAL HYPERTENSION: ICD-10-CM

## 2022-11-28 DIAGNOSIS — Z79.899 ENCOUNTER FOR LONG-TERM CURRENT USE OF MEDICATION: ICD-10-CM

## 2022-11-28 DIAGNOSIS — Z01.818 PREOPERATIVE CLEARANCE: ICD-10-CM

## 2022-11-28 DIAGNOSIS — K76.0 FATTY LIVER: ICD-10-CM

## 2022-11-28 DIAGNOSIS — R73.9 HYPERGLYCEMIA: ICD-10-CM

## 2022-11-28 LAB
ALBUMIN SERPL BCP-MCNC: 3.7 G/DL (ref 3.5–5.2)
ALP SERPL-CCNC: 211 U/L (ref 55–135)
ALT SERPL W/O P-5'-P-CCNC: 36 U/L (ref 10–44)
ANION GAP SERPL CALC-SCNC: 11 MMOL/L (ref 8–16)
AST SERPL-CCNC: 18 U/L (ref 10–40)
BILIRUB SERPL-MCNC: 1.2 MG/DL (ref 0.1–1)
BUN SERPL-MCNC: 10 MG/DL (ref 8–23)
CALCIUM SERPL-MCNC: 9.3 MG/DL (ref 8.7–10.5)
CHLORIDE SERPL-SCNC: 105 MMOL/L (ref 95–110)
CO2 SERPL-SCNC: 25 MMOL/L (ref 23–29)
CREAT SERPL-MCNC: 0.8 MG/DL (ref 0.5–1.4)
EST. GFR  (NO RACE VARIABLE): >60 ML/MIN/1.73 M^2
ESTIMATED AVG GLUCOSE: 163 MG/DL (ref 68–131)
GLUCOSE SERPL-MCNC: 134 MG/DL (ref 70–110)
HBA1C MFR BLD: 7.3 % (ref 4–5.6)
POTASSIUM SERPL-SCNC: 4 MMOL/L (ref 3.5–5.1)
PROT SERPL-MCNC: 6.1 G/DL (ref 6–8.4)
SODIUM SERPL-SCNC: 141 MMOL/L (ref 136–145)

## 2022-11-28 PROCEDURE — 80053 COMPREHEN METABOLIC PANEL: CPT

## 2022-11-28 PROCEDURE — 83036 HEMOGLOBIN GLYCOSYLATED A1C: CPT

## 2022-11-28 PROCEDURE — 36415 COLL VENOUS BLD VENIPUNCTURE: CPT

## 2022-12-30 NOTE — TELEPHONE ENCOUNTER
Phoned pt. Advised pt that surgery department will be calling him the day before his procedure on 6-18-19 to let him know exactly what time to be here on 6-19-19.  Also advised pt that he is to do his Suprep bowel kit on 6-18-19 at 2:00 pm and 8:00 pm as instructed in clinic and according the instruction sheet that was given to him.    No

## 2023-01-11 ENCOUNTER — HOSPITAL ENCOUNTER (OUTPATIENT)
Dept: RADIOLOGY | Facility: HOSPITAL | Age: 68
Discharge: HOME | End: 2023-01-11
Attending: INTERNAL MEDICINE
Payer: MEDICARE

## 2023-01-11 PROCEDURE — 76700 US ABDOMEN COMPLETE: ICD-10-PCS | Mod: 26,,, | Performed by: RADIOLOGY

## 2023-01-11 PROCEDURE — 76700 US EXAM ABDOM COMPLETE: CPT | Mod: TC

## 2023-01-11 PROCEDURE — 76700 US EXAM ABDOM COMPLETE: CPT | Mod: 26,,, | Performed by: RADIOLOGY

## 2023-01-12 ENCOUNTER — OFFICE VISIT (OUTPATIENT)
Dept: PODIATRY | Facility: CLINIC | Age: 68
End: 2023-01-12
Payer: MEDICARE

## 2023-01-12 VITALS
HEART RATE: 86 BPM | SYSTOLIC BLOOD PRESSURE: 117 MMHG | HEIGHT: 71 IN | WEIGHT: 234.63 LBS | DIASTOLIC BLOOD PRESSURE: 76 MMHG | BODY MASS INDEX: 32.85 KG/M2

## 2023-01-12 DIAGNOSIS — M19.071 OSTEOARTHRITIS OF RIGHT ANKLE AND FOOT: ICD-10-CM

## 2023-01-12 DIAGNOSIS — R26.81 UNSTEADY GAIT: ICD-10-CM

## 2023-01-12 DIAGNOSIS — L97.511 ULCER OF RIGHT FOOT, LIMITED TO BREAKDOWN OF SKIN: Primary | ICD-10-CM

## 2023-01-12 PROCEDURE — 99214 PR OFFICE/OUTPT VISIT, EST, LEVL IV, 30-39 MIN: ICD-10-PCS | Mod: S$PBB,,, | Performed by: PODIATRIST

## 2023-01-12 PROCEDURE — 99999 PR PBB SHADOW E&M-EST. PATIENT-LVL V: ICD-10-PCS | Mod: PBBFAC,,, | Performed by: PODIATRIST

## 2023-01-12 PROCEDURE — 99215 OFFICE O/P EST HI 40 MIN: CPT | Mod: PBBFAC,PN | Performed by: PODIATRIST

## 2023-01-12 PROCEDURE — 99999 PR PBB SHADOW E&M-EST. PATIENT-LVL V: CPT | Mod: PBBFAC,,, | Performed by: PODIATRIST

## 2023-01-12 PROCEDURE — 99214 OFFICE O/P EST MOD 30 MIN: CPT | Mod: S$PBB,,, | Performed by: PODIATRIST

## 2023-01-12 NOTE — PROGRESS NOTES
Subjective:       Patient ID: Elmer Cunha is a 67 y.o. male.    Chief Complaint:      Patient is complaining of right foot pain including the 5th digit and 1st digit right.  Follow-up  Associated symptoms include arthralgias and joint swelling.   Nail Problem  Associated symptoms include arthralgias and joint swelling.   Foot Pain  Associated symptoms include arthralgias and joint swelling.   Review of Systems   Musculoskeletal:  Positive for arthralgias, gait problem and joint swelling.   All other systems reviewed and are negative.    Objective:      Physical Exam  Vitals and nursing note reviewed.   Constitutional:       Appearance: He is well-developed.   Cardiovascular:      Rate and Rhythm: Normal rate and regular rhythm.      Pulses:           Dorsalis pedis pulses are 1+ on the right side and 1+ on the left side.        Posterior tibial pulses are 1+ on the right side and 1+ on the left side.      Heart sounds: Normal heart sounds.   Pulmonary:      Effort: Pulmonary effort is normal.      Breath sounds: Normal breath sounds.   Musculoskeletal:         General: Tenderness present.      Right foot: Decreased range of motion.   Feet:      Right foot:      Protective Sensation: 4 sites tested.  4 sites sensed.      Skin integrity: Erythema and warmth present.      Left foot:      Protective Sensation: 4 sites tested.  4 sites sensed.   Skin:     General: Skin is warm.      Capillary Refill: Capillary refill takes 2 to 3 seconds.   Neurological:      Mental Status: He is alert.   Psychiatric:         Behavior: Behavior normal.         Thought Content: Thought content normal.         Judgment: Judgment normal.                                                     No digital pictures were actually able to be taken due to Internet outage.                  Assessment:       1. Ulcer of right foot, limited to breakdown of skin    2. Osteoarthritis of right ankle and foot    3. Unsteady gait        Plan:                 Patient presents today for follow-up diabetic evaluation.  Patient is complaining about a painful right great toe where he has a pre ulcerative hyperkeratotic lesion as well as a 5th digit right that is contracted.  Patient states he does want to pursue surgery at the beginning of the new year to address the 5th digit right and remove the bone spurs off of the right great toe similar to the procedure that was performed on the left foot.  Areas of hyperkeratotic tissue pre ulcerative areas were non excisionally debrided today which gave the patient considerable relief he also had several ingrowing toenails today that were debrided patient states he did have a scary situation this morning he was transported to the emergency room of the hospital with significantly elevated blood pressure he states he is doing much better now but he was not feeling very good at the time.  Surgical consultation provided patient understands what is involved to address the contracted 5th digit right and the bone spurs on the right great toe plan follow-up will be as needed patient advised to contact us with any problems questions or concerns he stated these areas felt much better following debridement.  Patient states he has been having a lot of back problems back pain difficulty getting around.  I did non excisionally extensively debride the areas on the 5th digit right hand 1st digit right it has been several months since I have seen the patient last and these areas have become progressively thick and more painful in addition to the patient's chronically ingrowing toenails.    Patient states he is going through a series of injection in his back he states he has to do this in order to have back surgery that his primary concern right now is his back he states he is completely pain-free in the left foot following previous surgery on the left foot at some point he would like to have the contractures and the bone spurs addressed on the  right but his back is his primary concern.  We will continue to monitor these areas and keep the patient comfortable so that he can continue to ambulate with limited pain.  Patient indicates the 5th digit right is getting worse especially with the burning sensation that he has been experiencing and he would like to get in to have surgery on this sooner rather than later.  This note was created using [employer] voice recognition software that occasionally misinterpreted phrases or words.

## 2023-02-23 ENCOUNTER — OFFICE VISIT (OUTPATIENT)
Dept: PODIATRY | Facility: CLINIC | Age: 68
End: 2023-02-23
Payer: MEDICARE

## 2023-02-23 VITALS
SYSTOLIC BLOOD PRESSURE: 113 MMHG | DIASTOLIC BLOOD PRESSURE: 74 MMHG | HEIGHT: 71 IN | BODY MASS INDEX: 32.62 KG/M2 | HEART RATE: 60 BPM | WEIGHT: 233 LBS

## 2023-02-23 DIAGNOSIS — L60.0 INGROWN NAIL: ICD-10-CM

## 2023-02-23 DIAGNOSIS — M19.071 OSTEOARTHRITIS OF RIGHT ANKLE AND FOOT: ICD-10-CM

## 2023-02-23 DIAGNOSIS — E11.9 TYPE 2 DIABETES MELLITUS WITHOUT COMPLICATION, WITHOUT LONG-TERM CURRENT USE OF INSULIN: ICD-10-CM

## 2023-02-23 DIAGNOSIS — L97.511 ULCER OF RIGHT FOOT, LIMITED TO BREAKDOWN OF SKIN: Primary | ICD-10-CM

## 2023-02-23 DIAGNOSIS — E11.9 COMPREHENSIVE DIABETIC FOOT EXAMINATION, TYPE 2 DM, ENCOUNTER FOR: ICD-10-CM

## 2023-02-23 PROCEDURE — 99213 OFFICE O/P EST LOW 20 MIN: CPT | Mod: S$PBB,,, | Performed by: PODIATRIST

## 2023-02-23 PROCEDURE — 99213 PR OFFICE/OUTPT VISIT, EST, LEVL III, 20-29 MIN: ICD-10-PCS | Mod: S$PBB,,, | Performed by: PODIATRIST

## 2023-02-23 PROCEDURE — 99999 PR PBB SHADOW E&M-EST. PATIENT-LVL IV: ICD-10-PCS | Mod: PBBFAC,,, | Performed by: PODIATRIST

## 2023-02-23 PROCEDURE — 99999 PR PBB SHADOW E&M-EST. PATIENT-LVL IV: CPT | Mod: PBBFAC,,, | Performed by: PODIATRIST

## 2023-02-23 PROCEDURE — 99214 OFFICE O/P EST MOD 30 MIN: CPT | Mod: PBBFAC,PN | Performed by: PODIATRIST

## 2023-02-24 NOTE — PROGRESS NOTES
Subjective:       Patient ID: Elmer Cunha is a 67 y.o. male.    Chief Complaint:      Patient is complaining of right foot pain including the 5th digit and 1st digit right.  Follow-up  Associated symptoms include arthralgias and joint swelling.   Nail Problem  Associated symptoms include arthralgias and joint swelling.   Foot Pain  Associated symptoms include arthralgias and joint swelling.   Review of Systems   Musculoskeletal:  Positive for arthralgias, gait problem and joint swelling.   All other systems reviewed and are negative.    Objective:      Physical Exam  Vitals and nursing note reviewed.   Constitutional:       Appearance: He is well-developed.   Cardiovascular:      Rate and Rhythm: Normal rate and regular rhythm.      Pulses:           Dorsalis pedis pulses are 1+ on the right side and 1+ on the left side.        Posterior tibial pulses are 1+ on the right side and 1+ on the left side.      Heart sounds: Normal heart sounds.   Pulmonary:      Effort: Pulmonary effort is normal.      Breath sounds: Normal breath sounds.   Musculoskeletal:         General: Tenderness present.      Right foot: Decreased range of motion.   Feet:      Right foot:      Protective Sensation: 4 sites tested.  4 sites sensed.      Skin integrity: Erythema and warmth present.      Left foot:      Protective Sensation: 4 sites tested.  4 sites sensed.   Skin:     General: Skin is warm.      Capillary Refill: Capillary refill takes 2 to 3 seconds.   Neurological:      Mental Status: He is alert.   Psychiatric:         Behavior: Behavior normal.         Thought Content: Thought content normal.         Judgment: Judgment normal.                                                                             Assessment:       1. Ulcer of right foot, limited to breakdown of skin    2. Osteoarthritis of right ankle and foot    3. Type 2 diabetes mellitus without complication, without long-term current use of insulin    4.  Comprehensive diabetic foot examination, type 2 DM, encounter for    5. Ingrown nail        Plan:                Patient presents today for follow-up diabetic evaluation.  Patient is complaining about a painful right great toe where he has a pre ulcerative hyperkeratotic lesion as well as a 5th digit right that is contracted.  Patient states he does want to pursue surgery at the beginning of the new year to address the 5th digit right and remove the bone spurs off of the right great toe similar to the procedure that was performed on the left foot.  Areas of hyperkeratotic tissue pre ulcerative areas were non excisionally debrided today which gave the patient considerable relief he also had several ingrowing toenails today that were debrided patient states he did have a scary situation this morning he was transported to the emergency room of the hospital with significantly elevated blood pressure he states he is doing much better now but he was not feeling very good at the time.  Surgical consultation provided patient understands what is involved to address the contracted 5th digit right and the bone spurs on the right great toe plan follow-up will be as needed patient advised to contact us with any problems questions or concerns he stated these areas felt much better following debridement.  Patient states he has been having a lot of back problems back pain difficulty getting around.  I did non excisionally extensively debride the areas on the 5th digit right hand 1st digit right it has been several months since I have seen the patient last and these areas have become progressively thick and more painful in addition to the patient's chronically ingrowing toenails.    Patient states he is going through a series of injection in his back he states he has to do this in order to have back surgery that his primary concern right now is his back he states he is completely pain-free in the left foot following previous surgery  on the left foot at some point he would like to have the contractures and the bone spurs addressed on the right but his back is his primary concern.  We will continue to monitor these areas and keep the patient comfortable so that he can continue to ambulate with limited pain.  Patient indicates the 5th digit right is getting worse especially with the burning sensation that he has been experiencing and he would like to get in to have surgery on this sooner rather than later.  Patient states that he is scheduled to have a nerve ablation next week.  This note was created using [employer] voice recognition software that occasionally misinterpreted phrases or words.

## 2023-03-03 ENCOUNTER — OFFICE VISIT (OUTPATIENT)
Dept: URGENT CARE | Facility: CLINIC | Age: 68
End: 2023-03-03
Payer: MEDICARE

## 2023-03-03 VITALS
OXYGEN SATURATION: 98 % | WEIGHT: 233 LBS | BODY MASS INDEX: 32.62 KG/M2 | SYSTOLIC BLOOD PRESSURE: 132 MMHG | HEART RATE: 77 BPM | HEIGHT: 71 IN | TEMPERATURE: 98 F | DIASTOLIC BLOOD PRESSURE: 78 MMHG

## 2023-03-03 DIAGNOSIS — M25.562 ACUTE PAIN OF LEFT KNEE: Primary | ICD-10-CM

## 2023-03-03 PROCEDURE — 99203 OFFICE O/P NEW LOW 30 MIN: CPT | Mod: S$GLB,,, | Performed by: NURSE PRACTITIONER

## 2023-03-03 PROCEDURE — 99203 PR OFFICE/OUTPT VISIT, NEW, LEVL III, 30-44 MIN: ICD-10-PCS | Mod: S$GLB,,, | Performed by: NURSE PRACTITIONER

## 2023-03-03 NOTE — PROGRESS NOTES
"Subjective:       Patient ID: Elmer Cunha is a 67 y.o. male.    Vitals:  height is 5' 11" (1.803 m) and weight is 105.7 kg (233 lb). His oral temperature is 98.4 °F (36.9 °C). His blood pressure is 132/78 and his pulse is 77. His oxygen saturation is 98%.     Chief Complaint: Knee Pain    This is a 67 y.o. male who presents today with a chief complaint of  Patient presents with:  Knee Pain x 30 minutes ago. Patient states he was walking through his tariler and his left knee "Locked Up " . Denies injury        Knee Pain   The incident occurred less than 1 hour ago. There was no injury mechanism. The pain is present in the left knee. Quality: locking up. The pain is at a severity of 6/10. The patient is experiencing no pain. The pain has been Constant since onset. Associated symptoms include a loss of motion. Pertinent negatives include no inability to bear weight. He reports no foreign bodies present. The symptoms are aggravated by weight bearing. He has tried nothing for the symptoms. The treatment provided no relief.   ROS      Using walker, no distress, knee on exam looks normal, has limited range of motion but normal for pt due to surgery    Nothing acute seen on xray, hardware intact, told pt to still follow up, may need soft tissue studies.   Objective:      Physical Exam      Assessment:       1. Acute pain of left knee          Plan:         Acute pain of left knee  -     X-Ray Knee Complete 4 or More Views Left; Future; Expected date: 03/03/2023                     "

## 2023-03-03 NOTE — PATIENT INSTRUCTIONS
You must understand that you've received an Urgent Care treatment only and that you may be released before all your medical problems are known or treated. You, the patient, will arrange for follow up care as instructed.  Follow up with your PCP or specialty clinic as directed in the next 1-2 weeks if not improved or as needed.  You can call [phone] to schedule an appointment with the appropriate provider.  If your condition worsens we recommend that you receive another evaluation at the emergency room immediately or contact your primary medical clinics after hours call service to discuss your concerns.  Please return here or go to the Emergency Department for any concerns or worsening of condition.  Please if you smoke please consider quitting. [facility] Smoke cessation hotline number is [phone], available at this number is free counseling and medications to live a healthier life!       If you were prescribed a narcotic or controlled medication, do not drive or operate heavy equipment or machinery while taking these medications.    If you were not prescribed an antibiotic and your not better please return for a recheck. Antibiotic therapy is not always indicated initially.   Please attempt over the counter medications, give it time and try Echinacea, Zinc and Vitamin C to fight common colds and virus.     Apply a compressive ACE bandage. Rest and elevate the affected painful area.  Apply cold compresses intermittently as needed.  As pain recedes, begin normal activities slowly as tolerated.  Call if symptoms persist.

## 2023-03-13 ENCOUNTER — TELEPHONE (OUTPATIENT)
Dept: PODIATRY | Facility: CLINIC | Age: 68
End: 2023-03-13
Payer: MEDICARE

## 2023-03-13 DIAGNOSIS — M20.41 HAMMERTOE OF RIGHT FOOT: ICD-10-CM

## 2023-03-13 DIAGNOSIS — M20.41 HAMMER TOE OF RIGHT FOOT: Primary | ICD-10-CM

## 2023-03-13 NOTE — TELEPHONE ENCOUNTER
Patient called to let you know he had his back surgery and is now ready to have surgery on his other foot. Patient has appt this Thursday for his calluses. Please advise when next surgery date.

## 2023-03-16 ENCOUNTER — OFFICE VISIT (OUTPATIENT)
Dept: PODIATRY | Facility: CLINIC | Age: 68
End: 2023-03-16
Payer: MEDICARE

## 2023-03-16 VITALS
WEIGHT: 233 LBS | HEART RATE: 78 BPM | BODY MASS INDEX: 32.62 KG/M2 | SYSTOLIC BLOOD PRESSURE: 124 MMHG | DIASTOLIC BLOOD PRESSURE: 79 MMHG | HEIGHT: 71 IN

## 2023-03-16 DIAGNOSIS — M19.071 OSTEOARTHRITIS OF RIGHT ANKLE AND FOOT: ICD-10-CM

## 2023-03-16 DIAGNOSIS — E11.9 COMPREHENSIVE DIABETIC FOOT EXAMINATION, TYPE 2 DM, ENCOUNTER FOR: ICD-10-CM

## 2023-03-16 DIAGNOSIS — E11.9 TYPE 2 DIABETES MELLITUS WITHOUT COMPLICATION, WITHOUT LONG-TERM CURRENT USE OF INSULIN: Primary | ICD-10-CM

## 2023-03-16 PROCEDURE — 99999 PR PBB SHADOW E&M-EST. PATIENT-LVL IV: ICD-10-PCS | Mod: PBBFAC,,, | Performed by: PODIATRIST

## 2023-03-16 PROCEDURE — 99213 OFFICE O/P EST LOW 20 MIN: CPT | Mod: S$PBB,,, | Performed by: PODIATRIST

## 2023-03-16 PROCEDURE — 99214 OFFICE O/P EST MOD 30 MIN: CPT | Mod: PBBFAC,PN | Performed by: PODIATRIST

## 2023-03-16 PROCEDURE — 99213 PR OFFICE/OUTPT VISIT, EST, LEVL III, 20-29 MIN: ICD-10-PCS | Mod: S$PBB,,, | Performed by: PODIATRIST

## 2023-03-16 PROCEDURE — 99999 PR PBB SHADOW E&M-EST. PATIENT-LVL IV: CPT | Mod: PBBFAC,,, | Performed by: PODIATRIST

## 2023-03-16 RX ORDER — METHYLPREDNISOLONE 4 MG/1
TABLET ORAL
COMMUNITY
Start: 2023-02-15 | End: 2023-04-18 | Stop reason: ALTCHOICE

## 2023-03-17 ENCOUNTER — TELEPHONE (OUTPATIENT)
Dept: PODIATRY | Facility: CLINIC | Age: 68
End: 2023-03-17
Payer: MEDICARE

## 2023-03-17 NOTE — TELEPHONE ENCOUNTER
Patient was told that Dr. Gomez's office would need a cardiac clearance form sent to them for them to fill out. Form was faxed with confirmation received. Patient notified that I sent his form for Dr. Gomez to complete and send back to our office.

## 2023-03-19 NOTE — PROGRESS NOTES
Subjective:       Patient ID: Elmer Cunha is a 67 y.o. male.    Chief Complaint:      Patient is complaining of right foot pain including the 5th digit and 1st digit right.  Follow-up  Associated symptoms include arthralgias and joint swelling.   Nail Problem  Associated symptoms include arthralgias and joint swelling.   Foot Pain  Associated symptoms include arthralgias and joint swelling.   Review of Systems   Musculoskeletal:  Positive for arthralgias, gait problem and joint swelling.   All other systems reviewed and are negative.    Objective:      Physical Exam  Vitals and nursing note reviewed.   Constitutional:       Appearance: He is well-developed.   Cardiovascular:      Rate and Rhythm: Normal rate and regular rhythm.      Pulses:           Dorsalis pedis pulses are 1+ on the right side and 1+ on the left side.        Posterior tibial pulses are 1+ on the right side and 1+ on the left side.      Heart sounds: Normal heart sounds.   Pulmonary:      Effort: Pulmonary effort is normal.      Breath sounds: Normal breath sounds.   Musculoskeletal:         General: Tenderness present.      Right foot: Decreased range of motion.   Feet:      Right foot:      Protective Sensation: 4 sites tested.  4 sites sensed.      Skin integrity: Erythema and warmth present.      Left foot:      Protective Sensation: 4 sites tested.  4 sites sensed.   Skin:     General: Skin is warm.      Capillary Refill: Capillary refill takes 2 to 3 seconds.   Neurological:      Mental Status: He is alert.   Psychiatric:         Behavior: Behavior normal.         Thought Content: Thought content normal.         Judgment: Judgment normal.                                                                                                               Assessment:       1. Type 2 diabetes mellitus without complication, without long-term current use of insulin    2. Comprehensive diabetic foot examination, type 2 DM, encounter for    3.  Osteoarthritis of right ankle and foot        Plan:                Patient presents today for follow-up diabetic evaluation.  Patient is complaining about a painful right great toe where he has a pre ulcerative hyperkeratotic lesion as well as a 5th digit right that is contracted.  Patient states he does want to pursue surgery at the beginning of the new year to address the 5th digit right and remove the bone spurs off of the right great toe similar to the procedure that was performed on the left foot.  Areas of hyperkeratotic tissue pre ulcerative areas were non excisionally debrided today which gave the patient considerable relief he also had several ingrowing toenails today that were debrided patient states he did have a scary situation this morning he was transported to the emergency room of the hospital with significantly elevated blood pressure he states he is doing much better now but he was not feeling very good at the time.  Surgical consultation provided patient understands what is involved to address the contracted 5th digit right and the bone spurs on the right great toe plan follow-up will be as needed patient advised to contact us with any problems questions or concerns he stated these areas felt much better following debridement.  Patient states he has been having a lot of back problems back pain difficulty getting around.  I did non excisionally extensively debride the areas on the 5th digit right hand 1st digit right it has been several months since I have seen the patient last and these areas have become progressively thick and more painful in addition to the patient's chronically ingrowing toenails.    He states he is completely pain-free in the left foot following previous surgery on the left foot at some point he would like to have the contractures and the bone spurs addressed on the right but his back is his primary concern.  We will continue to monitor these areas and keep the patient  comfortable so that he can continue to ambulate with limited pain.  Patient indicates the 5th digit right is getting worse especially with the burning sensation that he has been experiencing and he would like to get in to have surgery on this sooner rather than later.  Patient states the nerve ablation procedure went fantastic he is very happy and he is doing very well he is prepared to proceed with surgery on the right foot to address the bone spurring right hallux and spurring with contracture of the 5th digit right we have him scheduled for the end of April I advised him he will need to see his cardiologist for clearance prior to the procedure and he will need to have that clearance faxed us or hand deliver to us.  Patient was dispensed a prescription requesting cardiac clearance that he will need to take to his cardiologist.  This note was created using [employer] voice recognition software that occasionally misinterpreted phrases or words.

## 2023-03-21 ENCOUNTER — TELEPHONE (OUTPATIENT)
Dept: PODIATRY | Facility: CLINIC | Age: 68
End: 2023-03-21
Payer: MEDICARE

## 2023-03-21 NOTE — TELEPHONE ENCOUNTER
Explained to patient that pre op has to be within a certain time from surgery date for it to be considered valid. Patient verbalized understanding.

## 2023-04-06 ENCOUNTER — OFFICE VISIT (OUTPATIENT)
Dept: PODIATRY | Facility: CLINIC | Age: 68
End: 2023-04-06
Payer: MEDICARE

## 2023-04-06 VITALS
WEIGHT: 233 LBS | HEART RATE: 96 BPM | HEIGHT: 71 IN | BODY MASS INDEX: 32.62 KG/M2 | DIASTOLIC BLOOD PRESSURE: 85 MMHG | SYSTOLIC BLOOD PRESSURE: 132 MMHG

## 2023-04-06 DIAGNOSIS — E11.9 TYPE 2 DIABETES MELLITUS WITHOUT COMPLICATION, WITHOUT LONG-TERM CURRENT USE OF INSULIN: ICD-10-CM

## 2023-04-06 DIAGNOSIS — M19.079 OSTEOARTHRITIS OF ANKLE AND FOOT, UNSPECIFIED LATERALITY: ICD-10-CM

## 2023-04-06 DIAGNOSIS — L60.0 INGROWN NAIL: ICD-10-CM

## 2023-04-06 DIAGNOSIS — E11.9 COMPREHENSIVE DIABETIC FOOT EXAMINATION, TYPE 2 DM, ENCOUNTER FOR: ICD-10-CM

## 2023-04-06 DIAGNOSIS — M20.41 HAMMER TOE OF RIGHT FOOT: Primary | ICD-10-CM

## 2023-04-06 PROCEDURE — 99999 PR PBB SHADOW E&M-EST. PATIENT-LVL V: CPT | Mod: PBBFAC,,, | Performed by: PODIATRIST

## 2023-04-06 PROCEDURE — 99999 PR PBB SHADOW E&M-EST. PATIENT-LVL V: ICD-10-PCS | Mod: PBBFAC,,, | Performed by: PODIATRIST

## 2023-04-06 PROCEDURE — 99215 OFFICE O/P EST HI 40 MIN: CPT | Mod: PBBFAC,PN | Performed by: PODIATRIST

## 2023-04-06 PROCEDURE — 99214 OFFICE O/P EST MOD 30 MIN: CPT | Mod: S$PBB,,, | Performed by: PODIATRIST

## 2023-04-06 PROCEDURE — 99214 PR OFFICE/OUTPT VISIT, EST, LEVL IV, 30-39 MIN: ICD-10-PCS | Mod: S$PBB,,, | Performed by: PODIATRIST

## 2023-04-09 NOTE — PROGRESS NOTES
Subjective:       Patient ID: Elmer Cunha is a 67 y.o. male.    Chief Complaint:      Patient is complaining of right foot pain including the 5th digit and 1st digit right.  Follow-up  Associated symptoms include arthralgias and joint swelling.   Nail Problem  Associated symptoms include arthralgias and joint swelling.   Foot Pain  Associated symptoms include arthralgias and joint swelling.   Review of Systems   Musculoskeletal:  Positive for arthralgias, gait problem and joint swelling.   All other systems reviewed and are negative.    Objective:      Physical Exam  Vitals and nursing note reviewed.   Constitutional:       Appearance: He is well-developed.   Cardiovascular:      Rate and Rhythm: Normal rate and regular rhythm.      Pulses:           Dorsalis pedis pulses are 1+ on the right side and 1+ on the left side.        Posterior tibial pulses are 1+ on the right side and 1+ on the left side.      Heart sounds: Normal heart sounds.   Pulmonary:      Effort: Pulmonary effort is normal.      Breath sounds: Normal breath sounds.   Musculoskeletal:         General: Tenderness present.      Right foot: Decreased range of motion.   Feet:      Right foot:      Protective Sensation: 4 sites tested.  4 sites sensed.      Skin integrity: Erythema and warmth present.      Left foot:      Protective Sensation: 4 sites tested.  4 sites sensed.   Skin:     General: Skin is warm.      Capillary Refill: Capillary refill takes 2 to 3 seconds.   Neurological:      Mental Status: He is alert.   Psychiatric:         Behavior: Behavior normal.         Thought Content: Thought content normal.         Judgment: Judgment normal.                                                                                                                     Assessment:       1. Hammer toe of right foot    2. Osteoarthritis of ankle and foot, unspecified laterality    3. Ingrown nail    4. Type 2 diabetes mellitus without complication, without  long-term current use of insulin    5. Comprehensive diabetic foot examination, type 2 DM, encounter for        Plan:                Patient presents today for follow-up diabetic evaluation.  Patient is complaining about a painful right great toe where he has a pre ulcerative hyperkeratotic lesion as well as a 5th digit right that is contracted.  Patient states he does want to pursue surgery at the beginning of the new year to address the 5th digit right and remove the bone spurs off of the right great toe similar to the procedure that was performed on the left foot.  Areas of hyperkeratotic tissue pre ulcerative areas were non excisionally debrided today which gave the patient considerable relief he also had several ingrowing toenails today that were debrided patient states he did have a scary situation this morning he was transported to the emergency room of the hospital with significantly elevated blood pressure he states he is doing much better now but he was not feeling very good at the time.  Surgical consultation provided patient understands what is involved to address the contracted 5th digit right and the bone spurs on the right great toe plan follow-up will be as needed patient advised to contact us with any problems questions or concerns he stated these areas felt much better following debridement.  Patient states he has been having a lot of back problems back pain difficulty getting around.  I did non excisionally extensively debride the areas on the 5th digit right hand 1st digit right it has been several months since I have seen the patient last and these areas have become progressively thick and more painful in addition to the patient's chronically ingrowing toenails.    He states he is completely pain-free in the left foot following previous surgery on the left foot at some point he would like to have the contractures and the bone spurs addressed on the right but his back is his primary concern.  We  will continue to monitor these areas and keep the patient comfortable so that he can continue to ambulate with limited pain.  Patient indicates the 5th digit right is getting worse especially with the burning sensation that he has been experiencing and he would like to get in to have surgery on this sooner rather than later.  Patient states the nerve ablation procedure went fantastic he is very happy and he is doing very well he is prepared to proceed with surgery on the right foot to address the bone spurring right hallux and spurring with contracture of the 5th digit right we have him scheduled for the end of April.  We have received the patient's cardiac clearance to proceed with surgery as discussed on the right foot.  This note was created using [employer] voice recognition software that occasionally misinterpreted phrases or words.

## 2023-04-12 ENCOUNTER — TELEPHONE (OUTPATIENT)
Dept: PODIATRY | Facility: CLINIC | Age: 68
End: 2023-04-12
Payer: MEDICARE

## 2023-04-12 NOTE — TELEPHONE ENCOUNTER
Patient wanted to verify that his insurance is the same in our system for his upcoming surgery date. Patient contacted medicare to verify.

## 2023-04-18 ENCOUNTER — OFFICE VISIT (OUTPATIENT)
Dept: PODIATRY | Facility: CLINIC | Age: 68
End: 2023-04-18
Payer: MEDICARE

## 2023-04-18 ENCOUNTER — HOSPITAL ENCOUNTER (OUTPATIENT)
Dept: RADIOLOGY | Facility: HOSPITAL | Age: 68
Discharge: HOME | End: 2023-04-18
Attending: PODIATRIST
Payer: MEDICARE

## 2023-04-18 VITALS
DIASTOLIC BLOOD PRESSURE: 87 MMHG | BODY MASS INDEX: 32.62 KG/M2 | HEART RATE: 93 BPM | HEIGHT: 71 IN | SYSTOLIC BLOOD PRESSURE: 135 MMHG | WEIGHT: 233 LBS

## 2023-04-18 DIAGNOSIS — Z01.818 PRE-OP EXAM: ICD-10-CM

## 2023-04-18 DIAGNOSIS — M77.51 BONE SPUR OF RIGHT FOOT: ICD-10-CM

## 2023-04-18 DIAGNOSIS — M20.41 HAMMER TOE OF RIGHT FOOT: Primary | ICD-10-CM

## 2023-04-18 DIAGNOSIS — E11.9 TYPE 2 DIABETES MELLITUS WITHOUT COMPLICATION, WITHOUT LONG-TERM CURRENT USE OF INSULIN: ICD-10-CM

## 2023-04-18 DIAGNOSIS — M20.41 HAMMER TOE OF RIGHT FOOT: ICD-10-CM

## 2023-04-18 DIAGNOSIS — E11.9 COMPREHENSIVE DIABETIC FOOT EXAMINATION, TYPE 2 DM, ENCOUNTER FOR: ICD-10-CM

## 2023-04-18 PROCEDURE — 99215 PR OFFICE/OUTPT VISIT, EST, LEVL V, 40-54 MIN: ICD-10-PCS | Mod: 57,S$PBB,, | Performed by: PODIATRIST

## 2023-04-18 PROCEDURE — 99999 PR PBB SHADOW E&M-EST. PATIENT-LVL V: ICD-10-PCS | Mod: PBBFAC,,, | Performed by: PODIATRIST

## 2023-04-18 PROCEDURE — 99215 OFFICE O/P EST HI 40 MIN: CPT | Mod: 57,S$PBB,, | Performed by: PODIATRIST

## 2023-04-18 PROCEDURE — 73630 XR FOOT COMPLETE 3 VIEW RIGHT: ICD-10-PCS | Mod: 26,RT,, | Performed by: RADIOLOGY

## 2023-04-18 PROCEDURE — 99215 OFFICE O/P EST HI 40 MIN: CPT | Mod: PBBFAC,PN | Performed by: PODIATRIST

## 2023-04-18 PROCEDURE — 73630 X-RAY EXAM OF FOOT: CPT | Mod: 26,RT,, | Performed by: RADIOLOGY

## 2023-04-18 PROCEDURE — 73630 X-RAY EXAM OF FOOT: CPT | Mod: TC,PN,RT

## 2023-04-18 PROCEDURE — 99999 PR PBB SHADOW E&M-EST. PATIENT-LVL V: CPT | Mod: PBBFAC,,, | Performed by: PODIATRIST

## 2023-04-18 RX ORDER — SODIUM CHLORIDE, SODIUM LACTATE, POTASSIUM CHLORIDE, CALCIUM CHLORIDE 600; 310; 30; 20 MG/100ML; MG/100ML; MG/100ML; MG/100ML
INJECTION, SOLUTION INTRAVENOUS CONTINUOUS
Status: CANCELLED | OUTPATIENT
Start: 2023-04-21

## 2023-04-18 RX ORDER — ONDANSETRON HYDROCHLORIDE 8 MG/1
8 TABLET, FILM COATED ORAL EVERY 8 HOURS PRN
Qty: 42 TABLET | Refills: 1 | Status: SHIPPED | OUTPATIENT
Start: 2023-04-18 | End: 2023-05-02

## 2023-04-18 RX ORDER — OXYCODONE AND ACETAMINOPHEN 10; 325 MG/1; MG/1
1 TABLET ORAL
Qty: 24 TABLET | Refills: 0 | Status: SHIPPED | OUTPATIENT
Start: 2023-04-18 | End: 2023-04-22

## 2023-04-18 RX ORDER — DOXYCYCLINE 100 MG/1
100 CAPSULE ORAL EVERY 12 HOURS
Qty: 28 CAPSULE | Refills: 0 | Status: SHIPPED | OUTPATIENT
Start: 2023-04-18 | End: 2023-05-02

## 2023-04-19 ENCOUNTER — TELEPHONE (OUTPATIENT)
Dept: PODIATRY | Facility: CLINIC | Age: 68
End: 2023-04-19
Payer: MEDICARE

## 2023-04-19 ENCOUNTER — LAB VISIT (OUTPATIENT)
Dept: LAB | Facility: HOSPITAL | Age: 68
End: 2023-04-19
Attending: PODIATRIST
Payer: MEDICARE

## 2023-04-19 DIAGNOSIS — I10 ESSENTIAL HYPERTENSION: ICD-10-CM

## 2023-04-19 DIAGNOSIS — Z01.818 PRE-OP EXAM: ICD-10-CM

## 2023-04-19 DIAGNOSIS — E11.9 TYPE 2 DIABETES MELLITUS WITHOUT COMPLICATION, WITHOUT LONG-TERM CURRENT USE OF INSULIN: ICD-10-CM

## 2023-04-19 DIAGNOSIS — M15.9 PRIMARY OSTEOARTHRITIS INVOLVING MULTIPLE JOINTS: ICD-10-CM

## 2023-04-19 DIAGNOSIS — E78.00 HYPERCHOLESTEREMIA: ICD-10-CM

## 2023-04-19 DIAGNOSIS — K21.00 GASTROESOPHAGEAL REFLUX DISEASE WITH ESOPHAGITIS WITHOUT HEMORRHAGE: ICD-10-CM

## 2023-04-19 DIAGNOSIS — Z79.899 ENCOUNTER FOR LONG-TERM CURRENT USE OF MEDICATION: ICD-10-CM

## 2023-04-19 LAB
ALBUMIN SERPL BCP-MCNC: 3.9 G/DL (ref 3.5–5.2)
ALP SERPL-CCNC: 183 U/L (ref 55–135)
ALT SERPL W/O P-5'-P-CCNC: 35 U/L (ref 10–44)
ANION GAP SERPL CALC-SCNC: 9 MMOL/L (ref 8–16)
AST SERPL-CCNC: 16 U/L (ref 10–40)
BASOPHILS # BLD AUTO: 0.03 K/UL (ref 0–0.2)
BASOPHILS NFR BLD: 0.4 % (ref 0–1.9)
BILIRUB SERPL-MCNC: 1.6 MG/DL (ref 0.1–1)
BUN SERPL-MCNC: 20 MG/DL (ref 8–23)
CALCIUM SERPL-MCNC: 9.5 MG/DL (ref 8.7–10.5)
CHLORIDE SERPL-SCNC: 102 MMOL/L (ref 95–110)
CHOLEST SERPL-MCNC: 186 MG/DL (ref 120–199)
CHOLEST/HDLC SERPL: 4.8 {RATIO} (ref 2–5)
CO2 SERPL-SCNC: 27 MMOL/L (ref 23–29)
CREAT SERPL-MCNC: 0.8 MG/DL (ref 0.5–1.4)
DIFFERENTIAL METHOD: NORMAL
EOSINOPHIL # BLD AUTO: 0.1 K/UL (ref 0–0.5)
EOSINOPHIL NFR BLD: 1.1 % (ref 0–8)
ERYTHROCYTE [DISTWIDTH] IN BLOOD BY AUTOMATED COUNT: 12.4 % (ref 11.5–14.5)
EST. GFR  (NO RACE VARIABLE): >60 ML/MIN/1.73 M^2
ESTIMATED AVG GLUCOSE: 200 MG/DL (ref 68–131)
GLUCOSE SERPL-MCNC: 200 MG/DL (ref 70–110)
HBA1C MFR BLD: 8.6 % (ref 4–5.6)
HCT VFR BLD AUTO: 43.2 % (ref 40–54)
HDLC SERPL-MCNC: 39 MG/DL (ref 40–75)
HDLC SERPL: 21 % (ref 20–50)
HGB BLD-MCNC: 15 G/DL (ref 14–18)
IMM GRANULOCYTES # BLD AUTO: 0.01 K/UL (ref 0–0.04)
IMM GRANULOCYTES NFR BLD AUTO: 0.1 % (ref 0–0.5)
LDLC SERPL CALC-MCNC: 127.2 MG/DL (ref 63–159)
LYMPHOCYTES # BLD AUTO: 1.3 K/UL (ref 1–4.8)
LYMPHOCYTES NFR BLD: 18.1 % (ref 18–48)
MCH RBC QN AUTO: 29 PG (ref 27–31)
MCHC RBC AUTO-ENTMCNC: 34.7 G/DL (ref 32–36)
MCV RBC AUTO: 84 FL (ref 82–98)
MONOCYTES # BLD AUTO: 0.7 K/UL (ref 0.3–1)
MONOCYTES NFR BLD: 9 % (ref 4–15)
NEUTROPHILS # BLD AUTO: 5.2 K/UL (ref 1.8–7.7)
NEUTROPHILS NFR BLD: 71.3 % (ref 38–73)
NONHDLC SERPL-MCNC: 147 MG/DL
NRBC BLD-RTO: 0 /100 WBC
PLATELET # BLD AUTO: 185 K/UL (ref 150–450)
PMV BLD AUTO: 10.3 FL (ref 9.2–12.9)
POTASSIUM SERPL-SCNC: 4.4 MMOL/L (ref 3.5–5.1)
PROT SERPL-MCNC: 6.5 G/DL (ref 6–8.4)
RBC # BLD AUTO: 5.17 M/UL (ref 4.6–6.2)
SODIUM SERPL-SCNC: 138 MMOL/L (ref 136–145)
T4 SERPL-MCNC: 8.3 UG/DL (ref 4.5–11.5)
TRIGL SERPL-MCNC: 99 MG/DL (ref 30–150)
TSH SERPL DL<=0.005 MIU/L-ACNC: 1.61 UIU/ML (ref 0.4–4)
WBC # BLD AUTO: 7.23 K/UL (ref 3.9–12.7)

## 2023-04-19 PROCEDURE — 84681 ASSAY OF C-PEPTIDE: CPT | Performed by: INTERNAL MEDICINE

## 2023-04-19 PROCEDURE — 85025 COMPLETE CBC W/AUTO DIFF WBC: CPT | Performed by: PODIATRIST

## 2023-04-19 PROCEDURE — 36415 COLL VENOUS BLD VENIPUNCTURE: CPT | Performed by: INTERNAL MEDICINE

## 2023-04-19 PROCEDURE — 84436 ASSAY OF TOTAL THYROXINE: CPT | Performed by: INTERNAL MEDICINE

## 2023-04-19 PROCEDURE — 84443 ASSAY THYROID STIM HORMONE: CPT | Performed by: INTERNAL MEDICINE

## 2023-04-19 PROCEDURE — 83036 HEMOGLOBIN GLYCOSYLATED A1C: CPT | Performed by: INTERNAL MEDICINE

## 2023-04-19 PROCEDURE — 80053 COMPREHEN METABOLIC PANEL: CPT | Performed by: PODIATRIST

## 2023-04-19 PROCEDURE — 80061 LIPID PANEL: CPT | Performed by: INTERNAL MEDICINE

## 2023-04-19 NOTE — TELEPHONE ENCOUNTER
Patient requesting that you send over something for pain that isn't  opiod. He is afraid to take anything too strong because it makes him feel funny and can't remember anything. Please advise.

## 2023-04-19 NOTE — TELEPHONE ENCOUNTER
Spoke with patient that there wasn't any alternative due to his Eliquis. Patient instructed that he could break tablet in half if it is too strong for him and the medication is just sent over in case he does have pain.

## 2023-04-19 NOTE — PROGRESS NOTES
Subjective:       Patient ID: Elmer Cunha is a 67 y.o. male.    Chief Complaint:      Patient is complaining of right foot pain including the 5th digit and 1st digit right.  Follow-up  Associated symptoms include arthralgias and joint swelling.   Nail Problem  Associated symptoms include arthralgias and joint swelling.   Foot Pain  Associated symptoms include arthralgias and joint swelling.   Review of Systems   Musculoskeletal:  Positive for arthralgias, gait problem and joint swelling.   All other systems reviewed and are negative.    Objective:      Physical Exam  Vitals and nursing note reviewed.   Constitutional:       Appearance: Normal appearance. He is well-developed.   Cardiovascular:      Rate and Rhythm: Normal rate and regular rhythm.      Pulses:           Dorsalis pedis pulses are 1+ on the right side and 1+ on the left side.        Posterior tibial pulses are 1+ on the right side and 1+ on the left side.      Heart sounds: Normal heart sounds.   Pulmonary:      Effort: Pulmonary effort is normal.      Breath sounds: Normal breath sounds.   Musculoskeletal:         General: Tenderness present.      Right foot: Decreased range of motion.        Feet:    Feet:      Right foot:      Protective Sensation: 4 sites tested.  4 sites sensed.      Skin integrity: Erythema and warmth present.      Toenail Condition: Right toenails are abnormally thick. Fungal disease present.     Left foot:      Protective Sensation: 4 sites tested.  4 sites sensed.      Toenail Condition: Left toenails are abnormally thick. Fungal disease present.  Skin:     General: Skin is warm.      Capillary Refill: Capillary refill takes 2 to 3 seconds.      Findings: Erythema present.   Neurological:      Mental Status: He is alert.   Psychiatric:         Behavior: Behavior normal.         Thought Content: Thought content normal.         Judgment: Judgment normal.                                                                                                                                            Assessment:       1. Hammer toe of right foot    2. Bone spur of right foot    3. Pre-op exam    4. Type 2 diabetes mellitus without complication, without long-term current use of insulin    5. Comprehensive diabetic foot examination, type 2 DM, encounter for        Plan:                 Patient is complaining about a painful right great toe where he has a pre ulcerative hyperkeratotic lesion as well as a 5th digit right that is contracted.   Patient states these areas have become progressively worse overlying the medial aspect of the right great toe and the lateral 5th digit as the 5th digit has become significantly more contracted patient states both areas burns significantly.  Patient had the exact same problem on the left foot he underwent surgery to correct the 5th digit left and remove the bone spurs from the left hallux this has been very successful he has no pain no discomfort and healed very well following this previous procedure.  Patient indicated today that he is not a diabetic he is not currently on diabetic medication however he has had elevated blood sugars and an elevated A1c.  Patient may decision to pursue surgery today following surgical consultation and discussion right foot.Patient presents today for preoperative history and physical in discussion all aspects of surgery were discussed with the patient no guarantees were given written or implied all potential complications including but not limited to delayed healing nonhealing postoperative pain infection recurrence were discussed with the patient in detail. All aspect of the patient's recovery time involved in recovery patient responsibility is involving recovery were also discussed in detail. Patient advised failure to comply with postoperative care will jeopardize surgical outcome.  All aspects of surgery were  discussed at length and in detail patient understands were going to resect the spurs on the base of the distal and proximal phalanx right great toe medial aspect and were going to do a de-rotational correction of the 5th digit with removal of spurs right foot the identical procedures that the patient had on the left foot that was very successful.  Patient understands he will be limited weight-bearing and have to wear a postoperative shoe for 4 weeks following surgery he will have to keep the dressing dry and intact.  Preoperative lab lab work has been ordered and will be evaluated prior to surgery.  Patient was dispensed prescriptions for doxycycline Percocet and Zofran patient had problems with the Bactrim that I gave him following the initial surgery we subsequently changed him to doxycycline which she tolerated well this was in his list of allergies however the patient has taken this previously so I subsequently removed it from his list of allergies.  Patient has received medical clearance from his cardiologist he will stop his Eliquis as of tomorrow morning and restarted 24 hours after surgery.  Patient understands the importance of ice and elevation postoperatively.  Patient's surgery has been scheduled for April 21, 2023 he has been advised to contact us with any problems questions or concerns prior to surgery.  Patient will be NPO after midnight taking only his blood pressure medication the morning of surgery.  Total face-to-face time including discussion evaluation treatment discussion of treatment options treatment plan surgical consultation decision for surgery and preoperative history and physical as well as preoperative orders equaled 45 minutes.  This note was created using [employer] voice recognition software that occasionally misinterpreted phrases or words.

## 2023-04-19 NOTE — H&P (VIEW-ONLY)
Subjective:       Patient ID: Elmer Cunha is a 67 y.o. male.    Chief Complaint:      Patient is complaining of right foot pain including the 5th digit and 1st digit right.  Follow-up  Associated symptoms include arthralgias and joint swelling.   Nail Problem  Associated symptoms include arthralgias and joint swelling.   Foot Pain  Associated symptoms include arthralgias and joint swelling.   Review of Systems   Musculoskeletal:  Positive for arthralgias, gait problem and joint swelling.   All other systems reviewed and are negative.    Objective:      Physical Exam  Vitals and nursing note reviewed.   Constitutional:       Appearance: Normal appearance. He is well-developed.   Cardiovascular:      Rate and Rhythm: Normal rate and regular rhythm.      Pulses:           Dorsalis pedis pulses are 1+ on the right side and 1+ on the left side.        Posterior tibial pulses are 1+ on the right side and 1+ on the left side.      Heart sounds: Normal heart sounds.   Pulmonary:      Effort: Pulmonary effort is normal.      Breath sounds: Normal breath sounds.   Musculoskeletal:         General: Tenderness present.      Right foot: Decreased range of motion.        Feet:    Feet:      Right foot:      Protective Sensation: 4 sites tested.  4 sites sensed.      Skin integrity: Erythema and warmth present.      Toenail Condition: Right toenails are abnormally thick. Fungal disease present.     Left foot:      Protective Sensation: 4 sites tested.  4 sites sensed.      Toenail Condition: Left toenails are abnormally thick. Fungal disease present.  Skin:     General: Skin is warm.      Capillary Refill: Capillary refill takes 2 to 3 seconds.      Findings: Erythema present.   Neurological:      Mental Status: He is alert.   Psychiatric:         Behavior: Behavior normal.         Thought Content: Thought content normal.         Judgment: Judgment normal.                                                                                                                                            Assessment:       1. Hammer toe of right foot    2. Bone spur of right foot    3. Pre-op exam    4. Type 2 diabetes mellitus without complication, without long-term current use of insulin    5. Comprehensive diabetic foot examination, type 2 DM, encounter for        Plan:                 Patient is complaining about a painful right great toe where he has a pre ulcerative hyperkeratotic lesion as well as a 5th digit right that is contracted.   Patient states these areas have become progressively worse overlying the medial aspect of the right great toe and the lateral 5th digit as the 5th digit has become significantly more contracted patient states both areas burns significantly.  Patient had the exact same problem on the left foot he underwent surgery to correct the 5th digit left and remove the bone spurs from the left hallux this has been very successful he has no pain no discomfort and healed very well following this previous procedure.  Patient indicated today that he is not a diabetic he is not currently on diabetic medication however he has had elevated blood sugars and an elevated A1c.  Patient may decision to pursue surgery today following surgical consultation and discussion right foot.Patient presents today for preoperative history and physical in discussion all aspects of surgery were discussed with the patient no guarantees were given written or implied all potential complications including but not limited to delayed healing nonhealing postoperative pain infection recurrence were discussed with the patient in detail. All aspect of the patient's recovery time involved in recovery patient responsibility is involving recovery were also discussed in detail. Patient advised failure to comply with postoperative care will jeopardize surgical outcome.  All aspects of surgery were  discussed at length and in detail patient understands were going to resect the spurs on the base of the distal and proximal phalanx right great toe medial aspect and were going to do a de-rotational correction of the 5th digit with removal of spurs right foot the identical procedures that the patient had on the left foot that was very successful.  Patient understands he will be limited weight-bearing and have to wear a postoperative shoe for 4 weeks following surgery he will have to keep the dressing dry and intact.  Preoperative lab lab work has been ordered and will be evaluated prior to surgery.  Patient was dispensed prescriptions for doxycycline Percocet and Zofran patient had problems with the Bactrim that I gave him following the initial surgery we subsequently changed him to doxycycline which she tolerated well this was in his list of allergies however the patient has taken this previously so I subsequently removed it from his list of allergies.  Patient has received medical clearance from his cardiologist he will stop his Eliquis as of tomorrow morning and restarted 24 hours after surgery.  Patient understands the importance of ice and elevation postoperatively.  Patient's surgery has been scheduled for April 21, 2023 he has been advised to contact us with any problems questions or concerns prior to surgery.  Patient will be NPO after midnight taking only his blood pressure medication the morning of surgery.  Total face-to-face time including discussion evaluation treatment discussion of treatment options treatment plan surgical consultation decision for surgery and preoperative history and physical as well as preoperative orders equaled 45 minutes.  This note was created using [employer] voice recognition software that occasionally misinterpreted phrases or words.

## 2023-04-20 LAB — C PEPTIDE SERPL-MCNC: 1.92 NG/ML (ref 0.78–5.19)

## 2023-04-21 ENCOUNTER — NURSE TRIAGE (OUTPATIENT)
Dept: ADMINISTRATIVE | Facility: CLINIC | Age: 68
End: 2023-04-21
Payer: MEDICARE

## 2023-04-21 ENCOUNTER — HOSPITAL ENCOUNTER (OUTPATIENT)
Facility: HOSPITAL | Age: 68
Discharge: HOME | End: 2023-04-21
Attending: PODIATRIST | Admitting: PODIATRIST
Payer: MEDICARE

## 2023-04-21 ENCOUNTER — ANESTHESIA EVENT (OUTPATIENT)
Dept: SURGERY | Facility: HOSPITAL | Age: 68
End: 2023-04-21
Payer: MEDICARE

## 2023-04-21 ENCOUNTER — ANESTHESIA (OUTPATIENT)
Dept: SURGERY | Facility: HOSPITAL | Age: 68
End: 2023-04-21
Payer: MEDICARE

## 2023-04-21 VITALS
TEMPERATURE: 98 F | BODY MASS INDEX: 32.62 KG/M2 | OXYGEN SATURATION: 98 % | WEIGHT: 233 LBS | RESPIRATION RATE: 20 BRPM | HEIGHT: 71 IN | HEART RATE: 69 BPM | DIASTOLIC BLOOD PRESSURE: 116 MMHG | SYSTOLIC BLOOD PRESSURE: 138 MMHG

## 2023-04-21 DIAGNOSIS — Z01.818 PRE-OP EXAM: ICD-10-CM

## 2023-04-21 DIAGNOSIS — E11.9 TYPE 2 DIABETES MELLITUS WITHOUT COMPLICATION, WITHOUT LONG-TERM CURRENT USE OF INSULIN: ICD-10-CM

## 2023-04-21 DIAGNOSIS — M77.51 BONE SPUR OF RIGHT FOOT: Primary | ICD-10-CM

## 2023-04-21 DIAGNOSIS — M20.41 HAMMER TOE OF RIGHT FOOT: ICD-10-CM

## 2023-04-21 DIAGNOSIS — M20.41 HAMMERTOE OF RIGHT FOOT: ICD-10-CM

## 2023-04-21 PROCEDURE — 28285 REPAIR OF HAMMERTOE: CPT | Mod: T9,,, | Performed by: PODIATRIST

## 2023-04-21 PROCEDURE — D9220A PRA ANESTHESIA: ICD-10-PCS | Mod: CRNA,,, | Performed by: NURSE ANESTHETIST, CERTIFIED REGISTERED

## 2023-04-21 PROCEDURE — 27201423 OPTIME MED/SURG SUP & DEVICES STERILE SUPPLY: Performed by: PODIATRIST

## 2023-04-21 PROCEDURE — 37000008 HC ANESTHESIA 1ST 15 MINUTES: Performed by: PODIATRIST

## 2023-04-21 PROCEDURE — 25000003 PHARM REV CODE 250: Performed by: NURSE ANESTHETIST, CERTIFIED REGISTERED

## 2023-04-21 PROCEDURE — 37000009 HC ANESTHESIA EA ADD 15 MINS: Performed by: PODIATRIST

## 2023-04-21 PROCEDURE — D9220A PRA ANESTHESIA: ICD-10-PCS | Mod: ANES,,, | Performed by: ANESTHESIOLOGY

## 2023-04-21 PROCEDURE — 71000033 HC RECOVERY, INTIAL HOUR: Performed by: PODIATRIST

## 2023-04-21 PROCEDURE — 71000015 HC POSTOP RECOV 1ST HR: Performed by: PODIATRIST

## 2023-04-21 PROCEDURE — 63600175 PHARM REV CODE 636 W HCPCS: Performed by: PODIATRIST

## 2023-04-21 PROCEDURE — 36000706: Performed by: PODIATRIST

## 2023-04-21 PROCEDURE — 25000003 PHARM REV CODE 250: Performed by: PODIATRIST

## 2023-04-21 PROCEDURE — 93010 ELECTROCARDIOGRAM REPORT: CPT | Mod: ,,, | Performed by: INTERNAL MEDICINE

## 2023-04-21 PROCEDURE — 28285 PR REPAIR OF HAMMERTOE,ONE: ICD-10-PCS | Mod: T9,,, | Performed by: PODIATRIST

## 2023-04-21 PROCEDURE — 93010 EKG 12-LEAD: ICD-10-PCS | Mod: ,,, | Performed by: INTERNAL MEDICINE

## 2023-04-21 PROCEDURE — 28124 PARTIAL REMOVAL OF TOE: CPT | Mod: 59,T5,, | Performed by: PODIATRIST

## 2023-04-21 PROCEDURE — 93005 ELECTROCARDIOGRAM TRACING: CPT

## 2023-04-21 PROCEDURE — 63600175 PHARM REV CODE 636 W HCPCS: Performed by: NURSE ANESTHETIST, CERTIFIED REGISTERED

## 2023-04-21 PROCEDURE — 36000707: Performed by: PODIATRIST

## 2023-04-21 PROCEDURE — D9220A PRA ANESTHESIA: Mod: ANES,,, | Performed by: ANESTHESIOLOGY

## 2023-04-21 PROCEDURE — D9220A PRA ANESTHESIA: Mod: CRNA,,, | Performed by: NURSE ANESTHETIST, CERTIFIED REGISTERED

## 2023-04-21 PROCEDURE — 28124 PR PART REMV PHALANX OF TOE: ICD-10-PCS | Mod: 59,T5,, | Performed by: PODIATRIST

## 2023-04-21 RX ORDER — DEXAMETHASONE SODIUM PHOSPHATE 4 MG/ML
INJECTION, SOLUTION INTRA-ARTICULAR; INTRALESIONAL; INTRAMUSCULAR; INTRAVENOUS; SOFT TISSUE
Status: DISCONTINUED | OUTPATIENT
Start: 2023-04-21 | End: 2023-04-21

## 2023-04-21 RX ORDER — BUPIVACAINE HYDROCHLORIDE 5 MG/ML
INJECTION, SOLUTION PERINEURAL
Status: DISCONTINUED | OUTPATIENT
Start: 2023-04-21 | End: 2023-04-21 | Stop reason: HOSPADM

## 2023-04-21 RX ORDER — SODIUM CHLORIDE, SODIUM LACTATE, POTASSIUM CHLORIDE, CALCIUM CHLORIDE 600; 310; 30; 20 MG/100ML; MG/100ML; MG/100ML; MG/100ML
125 INJECTION, SOLUTION INTRAVENOUS CONTINUOUS
Status: DISCONTINUED | OUTPATIENT
Start: 2023-04-21 | End: 2023-04-21 | Stop reason: HOSPADM

## 2023-04-21 RX ORDER — LIDOCAINE HYDROCHLORIDE 20 MG/ML
INJECTION, SOLUTION EPIDURAL; INFILTRATION; INTRACAUDAL; PERINEURAL
Status: DISCONTINUED | OUTPATIENT
Start: 2023-04-21 | End: 2023-04-21

## 2023-04-21 RX ORDER — SODIUM CHLORIDE, SODIUM LACTATE, POTASSIUM CHLORIDE, CALCIUM CHLORIDE 600; 310; 30; 20 MG/100ML; MG/100ML; MG/100ML; MG/100ML
INJECTION, SOLUTION INTRAVENOUS CONTINUOUS
Status: DISCONTINUED | OUTPATIENT
Start: 2023-04-21 | End: 2023-04-21 | Stop reason: HOSPADM

## 2023-04-21 RX ORDER — ONDANSETRON 2 MG/ML
INJECTION INTRAMUSCULAR; INTRAVENOUS
Status: DISCONTINUED | OUTPATIENT
Start: 2023-04-21 | End: 2023-04-21

## 2023-04-21 RX ORDER — LIDOCAINE HYDROCHLORIDE 10 MG/ML
INJECTION INFILTRATION; PERINEURAL
Status: DISCONTINUED | OUTPATIENT
Start: 2023-04-21 | End: 2023-04-21 | Stop reason: HOSPADM

## 2023-04-21 RX ORDER — MIDAZOLAM HYDROCHLORIDE 1 MG/ML
INJECTION, SOLUTION INTRAMUSCULAR; INTRAVENOUS
Status: DISCONTINUED | OUTPATIENT
Start: 2023-04-21 | End: 2023-04-21

## 2023-04-21 RX ORDER — CLINDAMYCIN PHOSPHATE 600 MG/50ML
600 INJECTION, SOLUTION INTRAVENOUS
Status: COMPLETED | OUTPATIENT
Start: 2023-04-21 | End: 2023-04-21

## 2023-04-21 RX ORDER — LIDOCAINE HYDROCHLORIDE 10 MG/ML
1 INJECTION, SOLUTION EPIDURAL; INFILTRATION; INTRACAUDAL; PERINEURAL ONCE
Status: DISCONTINUED | OUTPATIENT
Start: 2023-04-21 | End: 2023-04-21 | Stop reason: HOSPADM

## 2023-04-21 RX ORDER — FENTANYL CITRATE 50 UG/ML
INJECTION, SOLUTION INTRAMUSCULAR; INTRAVENOUS
Status: DISCONTINUED | OUTPATIENT
Start: 2023-04-21 | End: 2023-04-21

## 2023-04-21 RX ORDER — ONDANSETRON 2 MG/ML
4 INJECTION INTRAMUSCULAR; INTRAVENOUS DAILY PRN
Status: DISCONTINUED | OUTPATIENT
Start: 2023-04-21 | End: 2023-04-21 | Stop reason: HOSPADM

## 2023-04-21 RX ORDER — PROPOFOL 10 MG/ML
VIAL (ML) INTRAVENOUS
Status: DISCONTINUED | OUTPATIENT
Start: 2023-04-21 | End: 2023-04-21

## 2023-04-21 RX ORDER — ACETAMINOPHEN 10 MG/ML
INJECTION, SOLUTION INTRAVENOUS
Status: DISCONTINUED | OUTPATIENT
Start: 2023-04-21 | End: 2023-04-21

## 2023-04-21 RX ADMIN — FENTANYL CITRATE 50 MCG: 50 INJECTION, SOLUTION INTRAMUSCULAR; INTRAVENOUS at 07:04

## 2023-04-21 RX ADMIN — PROPOFOL 50 MG: 10 INJECTION, EMULSION INTRAVENOUS at 08:04

## 2023-04-21 RX ADMIN — PROPOFOL 50 MG: 10 INJECTION, EMULSION INTRAVENOUS at 07:04

## 2023-04-21 RX ADMIN — MIDAZOLAM HYDROCHLORIDE 2 MG: 1 INJECTION, SOLUTION INTRAMUSCULAR; INTRAVENOUS at 07:04

## 2023-04-21 RX ADMIN — FENTANYL CITRATE 25 MCG: 50 INJECTION, SOLUTION INTRAMUSCULAR; INTRAVENOUS at 08:04

## 2023-04-21 RX ADMIN — DEXAMETHASONE SODIUM PHOSPHATE 4 MG: 4 INJECTION, SOLUTION INTRA-ARTICULAR; INTRALESIONAL; INTRAMUSCULAR; INTRAVENOUS; SOFT TISSUE at 08:04

## 2023-04-21 RX ADMIN — SODIUM CHLORIDE, SODIUM LACTATE, POTASSIUM CHLORIDE, AND CALCIUM CHLORIDE: .6; .31; .03; .02 INJECTION, SOLUTION INTRAVENOUS at 07:04

## 2023-04-21 RX ADMIN — ACETAMINOPHEN 1000 MG: 10 INJECTION, SOLUTION INTRAVENOUS at 08:04

## 2023-04-21 RX ADMIN — CLINDAMYCIN PHOSPHATE 600 MG: 600 INJECTION, SOLUTION INTRAVENOUS at 07:04

## 2023-04-21 RX ADMIN — ONDANSETRON 4 MG: 2 INJECTION INTRAMUSCULAR; INTRAVENOUS at 08:04

## 2023-04-21 RX ADMIN — PROPOFOL 50 MG: 10 INJECTION, EMULSION INTRAVENOUS at 09:04

## 2023-04-21 RX ADMIN — LIDOCAINE HYDROCHLORIDE 100 MG: 20 INJECTION, SOLUTION EPIDURAL; INFILTRATION; INTRACAUDAL; PERINEURAL at 07:04

## 2023-04-21 RX ADMIN — MIDAZOLAM HYDROCHLORIDE 1 MG: 1 INJECTION, SOLUTION INTRAMUSCULAR; INTRAVENOUS at 08:04

## 2023-04-21 NOTE — ANESTHESIA POSTPROCEDURE EVALUATION
Anesthesia Post Evaluation    Patient: Elmer Cunha    Procedure(s) Performed: Procedure(s) (LRB):  CORRECTION, HAMMER TOE (Right)    Final Anesthesia Type: general      Patient location during evaluation: PACU  Patient participation: Yes- Able to Participate  Level of consciousness: awake and awake and alert  Post-procedure vital signs: reviewed and stable  Pain management: adequate  Airway patency: patent    PONV status at discharge: No PONV  Anesthetic complications: no      Cardiovascular status: blood pressure returned to baseline  Respiratory status: unassisted and spontaneous ventilation  Hydration status: euvolemic  Follow-up not needed.          Vitals Value Taken Time   /116 04/21/23 1005   Temp 36.6 °C (97.8 °F) 04/21/23 0920   Pulse 69 04/21/23 1007   Resp 13 04/21/23 1007   SpO2 99 % 04/21/23 1007   Vitals shown include unvalidated device data.      Event Time   Out of Recovery 10:05:00         Pain/Bernardo Score: Bernardo Score: 10 (4/21/2023 10:05 AM)  Modified Bernardo Score: 19 (4/21/2023 10:23 AM)

## 2023-04-21 NOTE — OP NOTE
[facility]  Operative Note     SUMMARY     Surgery Date: 4/21/2023       Pre-op Diagnosis:  Hammer toe of right foot [M20.41]    Post-op Diagnosis:  Post-Op Diagnosis Codes:     * Hammer toe of right foot [M20.41]    Procedure(s) (LRB):  CORRECTION, HAMMER TOE (Right)  Excision bone spur medial right hallux procedure code 67232  Correction contracted 5th digit with de-rotational skin plasty 5th digit right procedure code 63128  Surgeon(s) and Role:     * Nilay Cox DPM - Primary      Estimated Blood Loss:  Less than 5 cc  Hemostasis:  Ankle tourniquet placed at 250 mmHg for a period of 45 minutes    Anesthesia:  LMA in conjunction with local infiltrate equaling 20 cc of 1% lidocaine plain  Injectables 30 cc of 0.5% Marcaine plain  Materials sterile irrigant 4.0 Vicryl 3.0 Monocryl bone wax  Pathology none  Condition stable  Complications none    Description of the findings of the procedure:  Hammer toe of right foot [M20.41]         Specimens (From admission, onward)      None             Procedure:  Patient was taken the operating room placed in supine position on the OR table attention was then directed towards the patient's right ankle where Webril cast padding was placed on the patient's right ankle as was an ankle tourniquet.  Following this patient was locally anesthetized in a regional block of the 5th digit right foot and right hallux utilizing a total of 20 cc of 1% lidocaine plain.  Patient's foot was then prepped and draped in the usual sterile manner patient's foot was then exsanguinated utilizing Esmarch bandage and the ankle tourniquet was raised to 250 mmHg.  Following this intraoperative fluoroscopy was used to plan the incision overlying the dorsal 5th digit and 1st ray including the patient's right hallux especially over the medial aspect where the patient had notable spurring an initial longitudinal linear incision was created overlying the medial aspect of the patient's right  great toe this was carried down to the level of bone there was significant scar tissue noted within the area this was carefully debrided and removed a sagittal saw was used to resect the bone spurring from the base of the distal and head of the proximal phalanx right hallux once completely resected intraoperative fluoroscopy was used to ensure proper amount of resection a rotary bur was used to recontour and smooth the area the area was then flushed irrigated with copious amounts of sterile saline the hyperkeratotic lesion on the skin surface was also debrided today following flushing and irrigating the area deep closure was achieved with 4.0 Vicryl in a simple interrupted fashion skin closure was achieved with 3.0 Monocryl in a simple interrupted fashion.  Attention was then directed overlying the 5th digit where a double semi elliptical skin plasty angulated incision was created overlying the proximal interphalangeal joint this was carried down to the level of the extensor tendon which was then transected medial to lateral fashion reflected off the head of the proximal phalanx head of the proximal phalanx was then resected utilizing a sagittal saw.  This allowed for the patient's digit to be placed in a properly aligned reduced and de rotated position once proper positioning had been achieved the extensor tendon was trimmed to appropriate length and repaired via simple interrupted sutures of 4.0 Vicryl 3.0 Monocryl was used in a simple interrupted fashion to close the skin layers of the surgical site intraoperative fluoroscopy confirmed proper alignment and reduction of the previously noted deformity patient was then injected with additional 30 cc of 0.5% Marcaine plain in both surgical sites to create a long-term postoperative anesthetic.  Ankle tourniquet was lowered no significant bleeding noted Adaptic nonadhesive dressing sterile 4x4s multiple ABDs multiple rolls of Kerlix and 2 Ace wraps with minimal  compression were placed overlying the area patient left the operating room with vital signs stable and vascular status having return to the patient's preoperative levels.  Patient will maintain minimal weight-bearing status with the postoperative shoe he is to keep the area dry and intact and will follow up in clinic for postop.This note was created using [employer] voice recognition software that occasionally misinterpreted phrases or words.

## 2023-04-21 NOTE — PLAN OF CARE
Has met unit/department guidelines for discharge from each phase of the post procedure continuum. Leaving floor per w/c with RN. CONSTANTINO x3. Resp even and unlabored room air. No distress noted. Tolerating Po fluids without c/o nausea/vomiting. Denies c/o pain or nausea. Post-op dsg remains clean, dry and intact. Post-op shoe on right foot. All personal belongings returned to pt.

## 2023-04-21 NOTE — DISCHARGE INSTRUCTIONS

## 2023-04-21 NOTE — PLAN OF CARE
Spoke with Dr Cox about ordering home health per pt request. MD states that pt does not need home health based on this surgery, as he will be able to ambulate on foot and will have no dressing to change. Informed pt and he voices understanding.

## 2023-04-21 NOTE — TELEPHONE ENCOUNTER
MS    PCP:  Dr. Sonny Cerna III    C/O elevated BG.  B.  S/P Rt toe hammer correction.  Last ate at snacks today.  H/O Type II DM.  He does not take anything for DM.  He has had only 1 reading > 300.  Per protocol, care advised is see in office today.  OCA offered and declined therefore advised to UCC/ED.  Advised pt to drink adequate liquids to improve hydration and lower BG and not to eat/drink liquids/food high in sugar.  Advised to call for worsening/questions/concerns.  VU.  Message routed high priority to PCP and Surgeon.    Reason for Disposition   Patient wants to be seen    Additional Information   Negative: Unconscious or difficult to awaken   Negative: Acting confused (e.g., disoriented, slurred speech)   Negative: Very weak (can't stand)   Negative: Sounds like a life-threatening emergency to the triager   Negative: Vomiting and signs of dehydration (e.g., very dry mouth, lightheaded, dark urine)   Negative: Blood glucose > 240 mg/dL (13.3 mmol/L) and rapid breathing   Negative: Blood glucose > 500 mg/dL (27.8 mmol/L)   Negative: Blood glucose > 240 mg/dL (13.3 mmol/L) AND urine ketones moderate-large (or more than 1+)   Negative: Blood glucose > 240 mg/dL (13.3 mmol/L) and blood ketones > 1.4 mmol/L   Negative: Blood glucose > 240 mg/dL (13.3 mmol/L) AND vomiting AND unable to check for ketones (in blood or urine)   Negative: Vomiting lasting > 4 hours   Negative: Patient sounds very sick or weak to the triager   Negative: Fever > 100.4 F (38.0 C)   Negative: Caller has URGENT medication or insulin pump question and triager unable to answer question   Negative: Blood glucose > 400 mg/dL (22.2 mmol/L)   Negative: Blood glucose > 300 mg/dL (16.7 mmol/L) AND two or more times in a row   Negative: Urine ketones moderate - large (or blood ketones > 1.4 mmol/L)   Negative: New-onset diabetes mellitus suspected (e.g., frequent urination, weak, weight loss)   Negative: Symptoms of high blood sugar (e.g.,  frequent urination, weak, weight loss) and not able to test blood glucose    Protocols used: Diabetes - High Blood Sugar-A-OH

## 2023-04-21 NOTE — ANESTHESIA PREPROCEDURE EVALUATION
04/21/2023  Elmer Cunha is a 67 y.o., male.      Pre-op Assessment    I have reviewed the Patient Summary Reports.     I have reviewed the Nursing Notes.    I have reviewed the Medications.     Review of Systems  Anesthesia Hx:  No problems with previous Anesthesia  Neg history of prior surgery. Denies Family Hx of Anesthesia complications.   Denies Personal Hx of Anesthesia complications.   Social:  Non-Smoker    Hematology/Oncology:  Hematology Normal   Oncology Normal     EENT/Dental:EENT/Dental Normal   Cardiovascular:   Pacemaker Hypertension, well controlled    Pulmonary:  Pulmonary Normal    Renal/:  Renal/ Normal     Hepatic/GI:   GERD, poorly controlled    Musculoskeletal:  Musculoskeletal Normal    Neurological:   CVA    Endocrine:   Diabetes, well controlled, type 2    Dermatological:  Skin Normal    Psych:   Psychiatric History depression          Physical Exam  General: Alert    Airway:  Mallampati: II   Mouth Opening: Normal  TM Distance: Normal  Neck ROM: Normal ROM    Dental:  Intact    Chest/Lungs:  Clear to auscultation, Normal Respiratory Rate    Heart:  Rate: Normal    Abdomen:  Normal        Anesthesia Plan  Type of Anesthesia, risks & benefits discussed:    Anesthesia Type: Gen ETT  Post Op Pain Control Plan: multimodal analgesia  Airway Plan: Direct, Post-Induction  Informed Consent: Informed consent signed with the Patient and all parties understand the risks and agree with anesthesia plan.  All questions answered. Patient consented to blood products? No  ASA Score: 3  Day of Surgery Review of History & Physical: H&P Update referred to the surgeon/provider.    Ready For Surgery From Anesthesia Perspective.     .

## 2023-04-21 NOTE — TRANSFER OF CARE
"Anesthesia Transfer of Care Note    Patient: Elmer Cunha    Procedure(s) Performed: Procedure(s) (LRB):  CORRECTION, HAMMER TOE (Right)    Patient location: PACU    Anesthesia Type: general    Transport from OR: Transported from OR on room air with adequate spontaneous ventilation    Post pain: adequate analgesia    Post assessment: no apparent anesthetic complications and tolerated procedure well    Post vital signs: stable    Level of consciousness: sedated and responds to stimulation    Nausea/Vomiting: no nausea/vomiting    Complications: none    Transfer of care protocol was followed      Last vitals:   Visit Vitals  /78   Pulse 74   Temp 36.7 °C (98.1 °F)   Resp 20   Ht 5' 11" (1.803 m)   Wt 105.7 kg (233 lb)   SpO2 98%   BMI 32.50 kg/m²     "

## 2023-04-25 ENCOUNTER — OFFICE VISIT (OUTPATIENT)
Dept: PODIATRY | Facility: CLINIC | Age: 68
End: 2023-04-25
Payer: MEDICARE

## 2023-04-25 VITALS
HEIGHT: 71 IN | WEIGHT: 233 LBS | HEART RATE: 85 BPM | SYSTOLIC BLOOD PRESSURE: 140 MMHG | DIASTOLIC BLOOD PRESSURE: 85 MMHG | BODY MASS INDEX: 32.62 KG/M2

## 2023-04-25 DIAGNOSIS — M20.41 HAMMER TOE OF RIGHT FOOT: Primary | ICD-10-CM

## 2023-04-25 PROCEDURE — 99215 OFFICE O/P EST HI 40 MIN: CPT | Mod: PBBFAC,PN | Performed by: PODIATRIST

## 2023-04-25 PROCEDURE — 99999 PR PBB SHADOW E&M-EST. PATIENT-LVL V: ICD-10-PCS | Mod: PBBFAC,,, | Performed by: PODIATRIST

## 2023-04-25 PROCEDURE — 99024 POSTOP FOLLOW-UP VISIT: CPT | Mod: POP,,, | Performed by: PODIATRIST

## 2023-04-25 PROCEDURE — 99024 PR POST-OP FOLLOW-UP VISIT: ICD-10-PCS | Mod: POP,,, | Performed by: PODIATRIST

## 2023-04-25 PROCEDURE — 99999 PR PBB SHADOW E&M-EST. PATIENT-LVL V: CPT | Mod: PBBFAC,,, | Performed by: PODIATRIST

## 2023-04-27 NOTE — PROGRESS NOTES
"Elmer Cunha is a 67 y.o. male patient.   1. Hammer toe of right foot      Past Medical History:   Diagnosis Date    Arthritis     Atrial fibrillation     Brain abscess     FLUID    Cataract     Constipation     DDD (degenerative disc disease), lumbar     Depression     Fatty liver     GERD (gastroesophageal reflux disease)     Glaucoma     Hypercholesteremia     Hypertension     Stroke      No past surgical history pertinent negatives on file.  Scheduled Meds:  Continuous Infusions:  PRN Meds:    Review of patient's allergies indicates:   Allergen Reactions    Penicillin Rash and Other (See Comments)     Patient states she has no reaction to penicillinsPatient states she has no reaction to penicillin    Pcn [penicillins]      Had ? Reaction as child     There are no hospital problems to display for this patient.    Blood pressure (!) 140/85, pulse 85, height 5' 11" (1.803 m), weight 105.7 kg (233 lb 0.4 oz).                      Subjective  Objective  Assessment & Plan  Patient presents status post correction contracted 5th digit right and excision bone spur and degenerative arthritic spurring right hallux.  Patient states he is doing well his pain is being well managed he has started his Eliquis again has taken his pain medication as directed and is tolerating his doxycycline.  On evaluation the patient's dressing is dry and intact patient is currently afebrile no acute distress incision sites look good the inflammation the patient has is consistent with his current postoperative status.  A new well-padded thick protective dressing was applied patient will ambulate in a postoperative shoe I do plan to follow up with him in 1 week he has been advised to contact us with any problems questions or concerns.  Nilay Cox, RIGO  4/27/2023    "

## 2023-05-02 ENCOUNTER — OFFICE VISIT (OUTPATIENT)
Dept: PODIATRY | Facility: CLINIC | Age: 68
End: 2023-05-02
Payer: MEDICARE

## 2023-05-02 VITALS
SYSTOLIC BLOOD PRESSURE: 133 MMHG | HEART RATE: 79 BPM | DIASTOLIC BLOOD PRESSURE: 78 MMHG | WEIGHT: 233 LBS | HEIGHT: 71 IN | OXYGEN SATURATION: 97 % | BODY MASS INDEX: 32.62 KG/M2

## 2023-05-02 DIAGNOSIS — M20.41 HAMMER TOE OF RIGHT FOOT: Primary | ICD-10-CM

## 2023-05-02 PROCEDURE — 99999 PR PBB SHADOW E&M-EST. PATIENT-LVL IV: CPT | Mod: PBBFAC,,, | Performed by: PODIATRIST

## 2023-05-02 PROCEDURE — 99999 PR PBB SHADOW E&M-EST. PATIENT-LVL IV: ICD-10-PCS | Mod: PBBFAC,,, | Performed by: PODIATRIST

## 2023-05-02 PROCEDURE — 99024 POSTOP FOLLOW-UP VISIT: CPT | Mod: POP,,, | Performed by: PODIATRIST

## 2023-05-02 PROCEDURE — 99024 PR POST-OP FOLLOW-UP VISIT: ICD-10-PCS | Mod: POP,,, | Performed by: PODIATRIST

## 2023-05-02 PROCEDURE — 99214 OFFICE O/P EST MOD 30 MIN: CPT | Mod: PBBFAC,PN | Performed by: PODIATRIST

## 2023-05-02 NOTE — PROGRESS NOTES
"Elmer Cunha is a 67 y.o. male patient.   1. Hammer toe of right foot      Past Medical History:   Diagnosis Date    Arthritis     Atrial fibrillation     Brain abscess     FLUID    Cataract     Constipation     DDD (degenerative disc disease), lumbar     Depression     Fatty liver     GERD (gastroesophageal reflux disease)     Glaucoma     Hypercholesteremia     Hypertension     Stroke      No past surgical history pertinent negatives on file.  Scheduled Meds:  Continuous Infusions:  PRN Meds:    Review of patient's allergies indicates:   Allergen Reactions    Penicillin Rash and Other (See Comments)     Patient states she has no reaction to penicillinsPatient states she has no reaction to penicillin    Pcn [penicillins]      Had ? Reaction as child     There are no hospital problems to display for this patient.    Blood pressure 133/78, pulse 79, height 5' 11" (1.803 m), weight 105.7 kg (233 lb), SpO2 97 %.                            Subjective  Objective:  Vital signs (most recent): Blood pressure 133/78, pulse 79, height 5' 11" (1.803 m), weight 105.7 kg (233 lb), SpO2 97 %.  Assessment & Plan  Patient presents status post correction contracted 5th digit right and excision bone spur and degenerative arthritic spurring right hallux.  Patient states he is doing well he is only having occasional burning he has a significant reduction in previously noted erythema edema there is no drainage no signs of infection noted he has approximately 2 and half days left of his antibiotics.  Patient maintains good anatomic alignment and surgical correction of both the 1st and 5th digits right.  A new well-padded thick protective dressing was applied patient will ambulate in a postoperative shoe I do plan to follow up with him in 1 week he has been advised to contact us with any problems questions or concerns.  Nilay Cox DPM  5/2/2023      "

## 2023-05-05 RX ORDER — ONDANSETRON HYDROCHLORIDE 8 MG/1
TABLET, FILM COATED ORAL
Qty: 42 TABLET | Refills: 1 | OUTPATIENT
Start: 2023-05-05

## 2023-05-05 NOTE — TELEPHONE ENCOUNTER
LVM for patient to confirm if he actually needed medication for nausea this far post op or if the pharmacy just automatically sent refill request.

## 2023-05-09 ENCOUNTER — OFFICE VISIT (OUTPATIENT)
Dept: PODIATRY | Facility: CLINIC | Age: 68
End: 2023-05-09
Payer: MEDICARE

## 2023-05-09 VITALS
SYSTOLIC BLOOD PRESSURE: 136 MMHG | BODY MASS INDEX: 32.62 KG/M2 | HEIGHT: 71 IN | DIASTOLIC BLOOD PRESSURE: 83 MMHG | HEART RATE: 93 BPM | WEIGHT: 233 LBS

## 2023-05-09 DIAGNOSIS — E11.9 TYPE 2 DIABETES MELLITUS WITHOUT COMPLICATION, WITHOUT LONG-TERM CURRENT USE OF INSULIN: Primary | ICD-10-CM

## 2023-05-09 PROCEDURE — 99214 OFFICE O/P EST MOD 30 MIN: CPT | Mod: PBBFAC,PN | Performed by: PODIATRIST

## 2023-05-09 PROCEDURE — 99999 PR PBB SHADOW E&M-EST. PATIENT-LVL IV: ICD-10-PCS | Mod: PBBFAC,,, | Performed by: PODIATRIST

## 2023-05-09 PROCEDURE — 99024 PR POST-OP FOLLOW-UP VISIT: ICD-10-PCS | Mod: POP,,, | Performed by: PODIATRIST

## 2023-05-09 PROCEDURE — 99024 POSTOP FOLLOW-UP VISIT: CPT | Mod: POP,,, | Performed by: PODIATRIST

## 2023-05-09 PROCEDURE — 99999 PR PBB SHADOW E&M-EST. PATIENT-LVL IV: CPT | Mod: PBBFAC,,, | Performed by: PODIATRIST

## 2023-05-10 NOTE — PROGRESS NOTES
"Elmer Cunha is a 67 y.o. male patient.   1. Type 2 diabetes mellitus without complication, without long-term current use of insulin      Past Medical History:   Diagnosis Date    Arthritis     Atrial fibrillation     Brain abscess     FLUID    Cataract     Constipation     DDD (degenerative disc disease), lumbar     Depression     Fatty liver     GERD (gastroesophageal reflux disease)     Glaucoma     Hypercholesteremia     Hypertension     Stroke      No past surgical history pertinent negatives on file.  Scheduled Meds:  Continuous Infusions:  PRN Meds:    Review of patient's allergies indicates:   Allergen Reactions    Penicillin Rash and Other (See Comments)     Patient states she has no reaction to penicillinsPatient states she has no reaction to penicillin    Pcn [penicillins]      Had ? Reaction as child     There are no hospital problems to display for this patient.    Blood pressure 136/83, pulse 93, height 5' 11" (1.803 m), weight 105.7 kg (233 lb 0.4 oz).                                              Subjective  Objective:  Vital signs (most recent): Blood pressure 136/83, pulse 93, height 5' 11" (1.803 m), weight 105.7 kg (233 lb 0.4 oz).  Assessment & Plan  Patient presents status post correction contracted 5th digit right and excision bone spur and degenerative arthritic spurring right hallux.  Patient is doing well he is not having any significant pain or discomfort.  Patient maintains good anatomic alignment and surgical correction of both the 1st and 5th digits right.  A new well-padded thick protective dressing was applied patient will ambulate in a postoperative shoe I do plan to follow up with him in 2 weeks he has been advised to contact us with any problems questions or concerns.  Two week follow-up I will likely allow the patient to start getting the area wet and discontinue the dressing.  Nilay Cox DPM  5/10/2023        "

## 2023-05-23 ENCOUNTER — OFFICE VISIT (OUTPATIENT)
Dept: PODIATRY | Facility: CLINIC | Age: 68
End: 2023-05-23
Payer: MEDICARE

## 2023-05-23 VITALS
HEART RATE: 78 BPM | BODY MASS INDEX: 32.2 KG/M2 | DIASTOLIC BLOOD PRESSURE: 76 MMHG | SYSTOLIC BLOOD PRESSURE: 114 MMHG | HEIGHT: 71 IN | WEIGHT: 230 LBS

## 2023-05-23 DIAGNOSIS — M20.41 HAMMER TOE OF RIGHT FOOT: Primary | ICD-10-CM

## 2023-05-23 PROCEDURE — 99024 PR POST-OP FOLLOW-UP VISIT: ICD-10-PCS | Mod: POP,,, | Performed by: PODIATRIST

## 2023-05-23 PROCEDURE — 99999 PR PBB SHADOW E&M-EST. PATIENT-LVL IV: CPT | Mod: PBBFAC,,, | Performed by: PODIATRIST

## 2023-05-23 PROCEDURE — 99214 OFFICE O/P EST MOD 30 MIN: CPT | Mod: PBBFAC,PN | Performed by: PODIATRIST

## 2023-05-23 PROCEDURE — 99024 POSTOP FOLLOW-UP VISIT: CPT | Mod: POP,,, | Performed by: PODIATRIST

## 2023-05-23 PROCEDURE — 99999 PR PBB SHADOW E&M-EST. PATIENT-LVL IV: ICD-10-PCS | Mod: PBBFAC,,, | Performed by: PODIATRIST

## 2023-05-24 NOTE — PROGRESS NOTES
"Elmer Cunha is a 67 y.o. male patient.   1. Hammer toe of right foot      Past Medical History:   Diagnosis Date    Arthritis     Atrial fibrillation     Brain abscess     FLUID    Cataract     Constipation     DDD (degenerative disc disease), lumbar     Depression     Fatty liver     GERD (gastroesophageal reflux disease)     Glaucoma     Hypercholesteremia     Hypertension     Stroke      No past surgical history pertinent negatives on file.  Scheduled Meds:  Continuous Infusions:  PRN Meds:    Review of patient's allergies indicates:   Allergen Reactions    Penicillin Rash and Other (See Comments)     Patient states she has no reaction to penicillinsPatient states she has no reaction to penicillin    Pcn [penicillins]      Had ? Reaction as child     There are no hospital problems to display for this patient.    Blood pressure 114/76, pulse 78, height 5' 11" (1.803 m), weight 104.3 kg (230 lb).                                      Postoperative digital pictures were not able to be taken an added to the patient's chart due to an [employer] system error.        Subjective  Objective:  Vital signs (most recent): Blood pressure 114/76, pulse 78, height 5' 11" (1.803 m), weight 104.3 kg (230 lb).  Assessment & Plan  Patient presents status post correction contracted 5th digit right and excision bone spur and degenerative arthritic spurring right hallux.  Patient is doing well he is not having any significant pain or discomfort.  On evaluation site the surgical sites look very good patient has no drainage no signs of infection I did apply Betadine to the incisions patient needs to make sure he dries between his toes well he is not to scrub the incisions nor is he to soak his foot.  Patient can get the area wet bathing but must dry it very well he is not going to apply any cream lotion or ointment to the incision sites he is not to pull any of the dead skin off of the area just allow this to come off its on its own.  Plan " follow-up will be 2 weeks patient advised to contact us with any problems questions or concerns he can transition into a normal shoe as tolerated.This note was created using [employer] voice recognition software that occasionally misinterpreted phrases or words.   Nilay Cox DPM  5/24/2023

## 2023-06-06 ENCOUNTER — OFFICE VISIT (OUTPATIENT)
Dept: PODIATRY | Facility: CLINIC | Age: 68
End: 2023-06-06
Payer: MEDICARE

## 2023-06-06 VITALS
HEIGHT: 71 IN | WEIGHT: 230 LBS | BODY MASS INDEX: 32.2 KG/M2 | SYSTOLIC BLOOD PRESSURE: 115 MMHG | HEART RATE: 67 BPM | DIASTOLIC BLOOD PRESSURE: 68 MMHG

## 2023-06-06 DIAGNOSIS — L60.0 INGROWN NAIL: Primary | ICD-10-CM

## 2023-06-06 PROCEDURE — 99212 PR OFFICE/OUTPT VISIT, EST, LEVL II, 10-19 MIN: ICD-10-PCS | Mod: 24,S$PBB,, | Performed by: PODIATRIST

## 2023-06-06 PROCEDURE — 99999 PR PBB SHADOW E&M-EST. PATIENT-LVL IV: ICD-10-PCS | Mod: PBBFAC,,, | Performed by: PODIATRIST

## 2023-06-06 PROCEDURE — 99214 OFFICE O/P EST MOD 30 MIN: CPT | Mod: PBBFAC,PN | Performed by: PODIATRIST

## 2023-06-06 PROCEDURE — 99999 PR PBB SHADOW E&M-EST. PATIENT-LVL IV: CPT | Mod: PBBFAC,,, | Performed by: PODIATRIST

## 2023-06-06 PROCEDURE — 99212 OFFICE O/P EST SF 10 MIN: CPT | Mod: 24,S$PBB,, | Performed by: PODIATRIST

## 2023-06-07 NOTE — PROGRESS NOTES
"Elmer Cunha is a 67 y.o. male patient.   1. Ingrown nail      Past Medical History:   Diagnosis Date    Arthritis     Atrial fibrillation     Brain abscess     FLUID    Cataract     Constipation     DDD (degenerative disc disease), lumbar     Depression     Fatty liver     GERD (gastroesophageal reflux disease)     Glaucoma     Hypercholesteremia     Hypertension     Stroke      No past surgical history pertinent negatives on file.  Scheduled Meds:  Continuous Infusions:  PRN Meds:    Review of patient's allergies indicates:   Allergen Reactions    Penicillin Rash and Other (See Comments)     Patient states she has no reaction to penicillinsPatient states she has no reaction to penicillin    Pcn [penicillins]      Had ? Reaction as child    Doxycycline Nausea And Vomiting     There are no hospital problems to display for this patient.    Blood pressure 115/68, pulse 67, height 5' 11" (1.803 m), weight 104.3 kg (230 lb).                            Subjective  Objective:  Vital signs (most recent): Blood pressure 115/68, pulse 67, height 5' 11" (1.803 m), weight 104.3 kg (230 lb).  Assessment & Plan  Patient presents status post correction contracted 5th digit right and excision bone spur and degenerative arthritic spurring right hallux.  Patient is doing well he is not having any significant pain or discomfort.  Patient is doing very well he is having no pain no discomfort he states he is very pleased with the surgical outcome of the right foot the areas are completely healed.  Patient is now going to increase activity he will monitor these areas closely I do plan to follow up with him in 1 month did request several ingrowing toenails be debrided and removed today digits 2 3 and 4 of the right foot separate evaluation and management was performed to remove the ingrowing toenails on these digits.  Ingrowing toenails not related to previous surgery.  Plan follow-up 1 month unless the patient has any problems " questions or concerns sooner.  This note was created using [employer] voice recognition software that occasionally misinterpreted phrases or words.   Nilay Cox DPM  6/7/2023

## 2023-06-10 ENCOUNTER — NURSE TRIAGE (OUTPATIENT)
Dept: ADMINISTRATIVE | Facility: CLINIC | Age: 68
End: 2023-06-10
Payer: MEDICARE

## 2023-06-11 ENCOUNTER — DOCUMENTATION ONLY (OUTPATIENT)
Dept: PRIMARY CARE CLINIC | Facility: CLINIC | Age: 68
End: 2023-06-11
Payer: MEDICARE

## 2023-06-11 NOTE — PROGRESS NOTES
6/10/23 on call provider for [facility] [place]. Received call from [facility] nurse on call regarding patient. States he was seen a few days ago in office for SOB, and increased HR. States his Metoprolol dose was doubled to 25mg PO BID, and patient has a BP of 106/90 today. He is currently asymptomatic, states he denies any shortness of breath. Discussed to advise patient to return to 25mg PO qd dose and follow-up with PCP on Monday.

## 2023-07-06 ENCOUNTER — OFFICE VISIT (OUTPATIENT)
Dept: PODIATRY | Facility: CLINIC | Age: 68
End: 2023-07-06
Payer: MEDICARE

## 2023-07-06 VITALS
WEIGHT: 230 LBS | HEIGHT: 71 IN | DIASTOLIC BLOOD PRESSURE: 76 MMHG | BODY MASS INDEX: 32.2 KG/M2 | SYSTOLIC BLOOD PRESSURE: 108 MMHG | HEART RATE: 93 BPM

## 2023-07-06 DIAGNOSIS — I48.20 CHRONIC ATRIAL FIBRILLATION: ICD-10-CM

## 2023-07-06 DIAGNOSIS — E11.9 TYPE 2 DIABETES MELLITUS WITHOUT COMPLICATION, WITHOUT LONG-TERM CURRENT USE OF INSULIN: Primary | ICD-10-CM

## 2023-07-06 DIAGNOSIS — E11.9 COMPREHENSIVE DIABETIC FOOT EXAMINATION, TYPE 2 DM, ENCOUNTER FOR: ICD-10-CM

## 2023-07-06 DIAGNOSIS — R26.81 UNSTEADY GAIT: ICD-10-CM

## 2023-07-06 DIAGNOSIS — M19.071 OSTEOARTHRITIS OF RIGHT ANKLE AND FOOT: ICD-10-CM

## 2023-07-06 DIAGNOSIS — L60.0 INGROWN NAIL: ICD-10-CM

## 2023-07-06 PROCEDURE — 99213 OFFICE O/P EST LOW 20 MIN: CPT | Mod: 24,S$PBB,, | Performed by: PODIATRIST

## 2023-07-06 PROCEDURE — 99999 PR PBB SHADOW E&M-EST. PATIENT-LVL IV: CPT | Mod: PBBFAC,,, | Performed by: PODIATRIST

## 2023-07-06 PROCEDURE — 99214 OFFICE O/P EST MOD 30 MIN: CPT | Mod: PBBFAC,PN | Performed by: PODIATRIST

## 2023-07-06 PROCEDURE — 99999 PR PBB SHADOW E&M-EST. PATIENT-LVL IV: ICD-10-PCS | Mod: PBBFAC,,, | Performed by: PODIATRIST

## 2023-07-06 PROCEDURE — 99213 PR OFFICE/OUTPT VISIT, EST, LEVL III, 20-29 MIN: ICD-10-PCS | Mod: 24,S$PBB,, | Performed by: PODIATRIST

## 2023-07-08 PROBLEM — M20.41 HAMMER TOE OF RIGHT FOOT: Status: RESOLVED | Noted: 2021-10-30 | Resolved: 2023-07-08

## 2023-07-08 PROBLEM — M77.51 BONE SPUR OF RIGHT FOOT: Status: RESOLVED | Noted: 2023-04-18 | Resolved: 2023-07-08

## 2023-07-08 PROBLEM — L97.501 ULCER OF FOOT, LIMITED TO BREAKDOWN OF SKIN: Status: RESOLVED | Noted: 2019-11-24 | Resolved: 2023-07-08

## 2023-07-09 NOTE — PROGRESS NOTES
Subjective:       Patient ID: Elmer Cunha is a 67 y.o. male.    Chief Complaint:      Patient presents today follow-up diabetic evaluation.  Follow-up  Associated symptoms include arthralgias and joint swelling.   Nail Problem  Associated symptoms include arthralgias and joint swelling.   Foot Pain  Associated symptoms include arthralgias and joint swelling.   Review of Systems   Musculoskeletal:  Positive for arthralgias, gait problem and joint swelling.   All other systems reviewed and are negative.    Objective:      Physical Exam  Vitals and nursing note reviewed.   Constitutional:       Appearance: Normal appearance. He is well-developed.   Cardiovascular:      Rate and Rhythm: Normal rate and regular rhythm.      Pulses:           Dorsalis pedis pulses are 1+ on the right side and 1+ on the left side.        Posterior tibial pulses are 1+ on the right side and 1+ on the left side.      Heart sounds: Normal heart sounds.   Pulmonary:      Effort: Pulmonary effort is normal.      Breath sounds: Normal breath sounds.   Feet:      Right foot:      Protective Sensation: 4 sites tested.  2 sites sensed.      Toenail Condition: Right toenails are abnormally thick. Fungal disease present.     Left foot:      Protective Sensation: 4 sites tested.  2 sites sensed.      Toenail Condition: Left toenails are abnormally thick. Fungal disease present.  Skin:     General: Skin is warm.      Capillary Refill: Capillary refill takes 2 to 3 seconds.   Neurological:      Mental Status: He is alert.      Sensory: Sensory deficit present.   Psychiatric:         Behavior: Behavior normal.         Thought Content: Thought content normal.         Judgment: Judgment normal.                                 Assessment:       1. Type 2 diabetes mellitus without complication, without long-term current use of insulin    2. Comprehensive diabetic foot examination, type 2 DM, encounter for    3. Osteoarthritis of right ankle and foot     4. Unsteady gait    5. Chronic atrial fibrillation        Plan:                 Patient presents today for follow-up diabetic evaluation.  Patient is being seen for unrelated evaluation and management this is not related to previous surgery on the right foot these areas are completely healed and the patient had been previously released following surgery this is for his follow-up diabetic evaluation and to address chronically ingrown toenails.  Patient states for the past couple of weeks he is not been feeling very well he is back in atrial fibrillation and states he is out of breath and has no energy he states he was told he needs a cardiac ablation done.  Patient states he wanted to cancel his appointment today because of his atrial fibrillation but was concerned about the ingrowing toenails becoming infected and causing a complication with his diabetes.  Patient had significantly incurvated ingrowing toenails medial lateral border bilateral hallux this required debridement of the ingrowing nail areas were not currently infected but were inflamed patient did have discomfort in these areas all previous surgical sites well healed and pain-free at this time.  I was able to successfully remove the ingrowing nail gave the patient considerable relief.  Recommended follow-up is 2-3 months because of the chronically ingrown nails the patient has any problems questions or concerns sooner he is immediately.  This note was created using [employer] voice recognition software that occasionally misinterpreted phrases or words.

## 2023-08-17 ENCOUNTER — OFFICE VISIT (OUTPATIENT)
Dept: PODIATRY | Facility: CLINIC | Age: 68
End: 2023-08-17
Payer: MEDICARE

## 2023-08-17 VITALS
WEIGHT: 230 LBS | BODY MASS INDEX: 32.2 KG/M2 | DIASTOLIC BLOOD PRESSURE: 79 MMHG | HEART RATE: 76 BPM | SYSTOLIC BLOOD PRESSURE: 119 MMHG | HEIGHT: 71 IN

## 2023-08-17 DIAGNOSIS — L60.0 INGROWN NAIL: Primary | ICD-10-CM

## 2023-08-17 DIAGNOSIS — R26.81 UNSTEADY GAIT: ICD-10-CM

## 2023-08-17 DIAGNOSIS — E11.9 COMPREHENSIVE DIABETIC FOOT EXAMINATION, TYPE 2 DM, ENCOUNTER FOR: ICD-10-CM

## 2023-08-17 DIAGNOSIS — E11.9 TYPE 2 DIABETES MELLITUS WITHOUT COMPLICATION, WITHOUT LONG-TERM CURRENT USE OF INSULIN: ICD-10-CM

## 2023-08-17 PROCEDURE — 99213 OFFICE O/P EST LOW 20 MIN: CPT | Mod: S$PBB,,, | Performed by: PODIATRIST

## 2023-08-17 PROCEDURE — 99999 PR PBB SHADOW E&M-EST. PATIENT-LVL IV: ICD-10-PCS | Mod: PBBFAC,,, | Performed by: PODIATRIST

## 2023-08-17 PROCEDURE — 99999 PR PBB SHADOW E&M-EST. PATIENT-LVL IV: CPT | Mod: PBBFAC,,, | Performed by: PODIATRIST

## 2023-08-17 PROCEDURE — 99214 OFFICE O/P EST MOD 30 MIN: CPT | Mod: PBBFAC,PN | Performed by: PODIATRIST

## 2023-08-17 PROCEDURE — 99213 PR OFFICE/OUTPT VISIT, EST, LEVL III, 20-29 MIN: ICD-10-PCS | Mod: S$PBB,,, | Performed by: PODIATRIST

## 2023-08-17 RX ORDER — SOTALOL HYDROCHLORIDE 80 MG/1
TABLET ORAL
COMMUNITY
Start: 2023-07-29

## 2023-08-20 NOTE — PROGRESS NOTES
Subjective:       Patient ID: Elmer Cunha is a 67 y.o. male.    Chief Complaint:      Patient presents today follow-up diabetic evaluation.  Follow-up  Associated symptoms include arthralgias and joint swelling.   Nail Problem  Associated symptoms include arthralgias and joint swelling.   Foot Pain  Associated symptoms include arthralgias and joint swelling.     Review of Systems   Musculoskeletal:  Positive for arthralgias, gait problem and joint swelling.   All other systems reviewed and are negative.      Objective:      Physical Exam  Vitals and nursing note reviewed.   Constitutional:       Appearance: Normal appearance. He is well-developed.   Cardiovascular:      Rate and Rhythm: Normal rate and regular rhythm.      Pulses:           Dorsalis pedis pulses are 1+ on the right side and 1+ on the left side.        Posterior tibial pulses are 1+ on the right side and 1+ on the left side.      Heart sounds: Normal heart sounds.   Pulmonary:      Effort: Pulmonary effort is normal.      Breath sounds: Normal breath sounds.   Feet:      Right foot:      Protective Sensation: 4 sites tested.  2 sites sensed.      Toenail Condition: Right toenails are abnormally thick. Fungal disease present.     Left foot:      Protective Sensation: 4 sites tested.  2 sites sensed.      Toenail Condition: Left toenails are abnormally thick. Fungal disease present.  Skin:     General: Skin is warm.      Capillary Refill: Capillary refill takes 2 to 3 seconds.   Neurological:      Mental Status: He is alert.      Sensory: Sensory deficit present.   Psychiatric:         Behavior: Behavior normal.         Thought Content: Thought content normal.         Judgment: Judgment normal.                                                 Assessment:       1. Ingrown nail    2. Type 2 diabetes mellitus without complication, without long-term current use of insulin    3. Comprehensive diabetic foot examination, type 2 DM, encounter for    4.  Unsteady gait        Plan:                 Patient presents today for follow-up diabetic evaluation.  Patient is being seen for follow-up diabetic evaluation and to address chronically ingrown toenails.  Patient states he is feeling a lot better he was placed on a new blood pressure medication which has subsequently helped his AFib and states he is feeling significantly better last time I saw him he had no energy and was not feeling well.  Patient had significantly incurvated ingrowing toenails medial lateral border bilateral hallux this required debridement of the ingrowing nail areas were not currently infected but were inflamed patient did have discomfort in these areas all previous surgical sites well healed and pain-free at this time.  I was able to successfully remove the ingrowing nail gave the patient considerable relief.  Comprehensive diabetic evaluation performed today patient does have a history of previous ulceration and skin breakdown areas of previous bone spur formation have been surgically address the patient states he is very pleased with the surgery these areas are no longer an issue however there is other areas that need to be monitored closely because he is at high risk.  Recommended follow-up is 2-3 months because of the chronically ingrown nails the patient has any problems questions or concerns sooner he is immediately.  This note was created using [employer] voice recognition software that occasionally misinterpreted phrases or words.

## 2023-08-30 ENCOUNTER — HOSPITAL ENCOUNTER (OUTPATIENT)
Dept: RADIOLOGY | Facility: HOSPITAL | Age: 68
Discharge: HOME | End: 2023-08-30
Attending: NURSE PRACTITIONER
Payer: MEDICARE

## 2023-08-30 DIAGNOSIS — M25.30 JOINT INSTABILITY: ICD-10-CM

## 2023-08-30 DIAGNOSIS — M51.36 DDD (DEGENERATIVE DISC DISEASE), LUMBAR: ICD-10-CM

## 2023-08-30 DIAGNOSIS — M25.551 RIGHT HIP PAIN: ICD-10-CM

## 2023-09-28 ENCOUNTER — OFFICE VISIT (OUTPATIENT)
Dept: PODIATRY | Facility: CLINIC | Age: 68
End: 2023-09-28
Payer: MEDICARE

## 2023-09-28 VITALS
HEIGHT: 71 IN | HEART RATE: 89 BPM | SYSTOLIC BLOOD PRESSURE: 136 MMHG | WEIGHT: 218 LBS | BODY MASS INDEX: 30.52 KG/M2 | DIASTOLIC BLOOD PRESSURE: 81 MMHG

## 2023-09-28 DIAGNOSIS — E11.9 TYPE 2 DIABETES MELLITUS WITHOUT COMPLICATION, WITHOUT LONG-TERM CURRENT USE OF INSULIN: ICD-10-CM

## 2023-09-28 DIAGNOSIS — L60.0 INGROWN NAIL: Primary | ICD-10-CM

## 2023-09-28 DIAGNOSIS — E11.9 COMPREHENSIVE DIABETIC FOOT EXAMINATION, TYPE 2 DM, ENCOUNTER FOR: ICD-10-CM

## 2023-09-28 PROCEDURE — 99213 OFFICE O/P EST LOW 20 MIN: CPT | Mod: PBBFAC,PN | Performed by: PODIATRIST

## 2023-09-28 PROCEDURE — 99213 PR OFFICE/OUTPT VISIT, EST, LEVL III, 20-29 MIN: ICD-10-PCS | Mod: S$PBB,,, | Performed by: PODIATRIST

## 2023-09-28 PROCEDURE — 99999 PR PBB SHADOW E&M-EST. PATIENT-LVL III: ICD-10-PCS | Mod: PBBFAC,,, | Performed by: PODIATRIST

## 2023-09-28 PROCEDURE — 99999 PR PBB SHADOW E&M-EST. PATIENT-LVL III: CPT | Mod: PBBFAC,,, | Performed by: PODIATRIST

## 2023-09-28 PROCEDURE — 99213 OFFICE O/P EST LOW 20 MIN: CPT | Mod: S$PBB,,, | Performed by: PODIATRIST

## 2023-10-01 NOTE — PROGRESS NOTES
Subjective:       Patient ID: Elmer Cunha is a 68 y.o. male.    Chief Complaint:      Patient presents today follow-up diabetic evaluation.  Follow-up  Associated symptoms include arthralgias and joint swelling.   Nail Problem  Associated symptoms include arthralgias and joint swelling.   Foot Pain  Associated symptoms include arthralgias and joint swelling.     Review of Systems   Musculoskeletal:  Positive for arthralgias, gait problem and joint swelling.   All other systems reviewed and are negative.      Objective:      Physical Exam  Vitals and nursing note reviewed.   Constitutional:       Appearance: Normal appearance. He is well-developed.   Cardiovascular:      Rate and Rhythm: Normal rate and regular rhythm.      Pulses:           Dorsalis pedis pulses are 1+ on the right side and 1+ on the left side.        Posterior tibial pulses are 1+ on the right side and 1+ on the left side.      Heart sounds: Normal heart sounds.   Pulmonary:      Effort: Pulmonary effort is normal.      Breath sounds: Normal breath sounds.   Feet:      Right foot:      Protective Sensation: 4 sites tested.  2 sites sensed.      Toenail Condition: Right toenails are abnormally thick. Fungal disease present.     Left foot:      Protective Sensation: 4 sites tested.  2 sites sensed.      Toenail Condition: Left toenails are abnormally thick. Fungal disease present.  Skin:     General: Skin is warm.      Capillary Refill: Capillary refill takes 2 to 3 seconds.   Neurological:      Mental Status: He is alert.      Sensory: Sensory deficit present.   Psychiatric:         Behavior: Behavior normal.         Thought Content: Thought content normal.         Judgment: Judgment normal.                 Assessment:       1. Ingrown nail    2. Type 2 diabetes mellitus without complication, without long-term current use of insulin    3. Comprehensive diabetic foot examination, type 2 DM, encounter for        Plan:                 Patient  presents today for follow-up diabetic evaluation.  Patient is being seen for follow-up diabetic evaluation and to address chronically ingrown toenails.  Patient states he is feeling a lot better he was placed on a new blood pressure medication which has subsequently helped his AFib and states he is feeling significantly better last time I saw him he had no energy and was not feeling well.  Patient had significantly incurvated ingrowing toenails medial lateral border bilateral hallux this required debridement of the ingrowing nail areas were not currently infected but were inflamed patient did have discomfort in these areas all previous surgical sites well healed and pain-free at this time.  I was able to successfully remove the ingrowing nail gave the patient considerable relief.  Comprehensive diabetic evaluation performed today patient does have a history of previous ulceration and skin breakdown areas of previous bone spur formation have been surgically address the patient states he is very pleased with the surgery these areas are no longer an issue however there is other areas that need to be monitored closely because he is at high risk.  Patient relates he is having a hip issue today he states it is very hard for him to get around and it has been did very difficult recently he is hopeful with time it will improve some he is currently doing therapy in the pool which I advised the patient he is usually very very beneficial.  Recommended follow-up is 2-3 months because of the chronically ingrown nails the patient has any problems questions or concerns sooner he is immediately.  This note was created using [employer] voice recognition software that occasionally misinterpreted phrases or words.

## 2023-11-09 ENCOUNTER — OFFICE VISIT (OUTPATIENT)
Dept: PODIATRY | Facility: CLINIC | Age: 68
End: 2023-11-09
Payer: MEDICARE

## 2023-11-09 VITALS
BODY MASS INDEX: 29.4 KG/M2 | WEIGHT: 210 LBS | DIASTOLIC BLOOD PRESSURE: 80 MMHG | HEART RATE: 72 BPM | HEIGHT: 71 IN | SYSTOLIC BLOOD PRESSURE: 110 MMHG

## 2023-11-09 DIAGNOSIS — E11.9 TYPE 2 DIABETES MELLITUS WITHOUT COMPLICATION, WITHOUT LONG-TERM CURRENT USE OF INSULIN: ICD-10-CM

## 2023-11-09 DIAGNOSIS — E11.9 COMPREHENSIVE DIABETIC FOOT EXAMINATION, TYPE 2 DM, ENCOUNTER FOR: ICD-10-CM

## 2023-11-09 DIAGNOSIS — I48.20 CHRONIC ATRIAL FIBRILLATION: ICD-10-CM

## 2023-11-09 DIAGNOSIS — L60.0 INGROWN NAIL: Primary | ICD-10-CM

## 2023-11-09 PROCEDURE — 99213 OFFICE O/P EST LOW 20 MIN: CPT | Mod: S$PBB,,, | Performed by: PODIATRIST

## 2023-11-09 PROCEDURE — 99999 PR PBB SHADOW E&M-EST. PATIENT-LVL IV: CPT | Mod: PBBFAC,,, | Performed by: PODIATRIST

## 2023-11-09 PROCEDURE — 99999 PR PBB SHADOW E&M-EST. PATIENT-LVL IV: ICD-10-PCS | Mod: PBBFAC,,, | Performed by: PODIATRIST

## 2023-11-09 PROCEDURE — 99214 OFFICE O/P EST MOD 30 MIN: CPT | Mod: PBBFAC,PN | Performed by: PODIATRIST

## 2023-11-09 PROCEDURE — 99213 PR OFFICE/OUTPT VISIT, EST, LEVL III, 20-29 MIN: ICD-10-PCS | Mod: S$PBB,,, | Performed by: PODIATRIST

## 2023-11-12 NOTE — PROGRESS NOTES
Subjective:       Patient ID: Elmer Cunha is a 68 y.o. male.    Chief Complaint:      Patient presents today follow-up diabetic evaluation.  Follow-up  Associated symptoms include arthralgias and joint swelling.   Nail Problem  Associated symptoms include arthralgias and joint swelling.   Foot Pain  Associated symptoms include arthralgias and joint swelling.   Ingrown Toenail  Associated symptoms include arthralgias and joint swelling.     Review of Systems   Musculoskeletal:  Positive for arthralgias, gait problem and joint swelling.   All other systems reviewed and are negative.      Objective:      Physical Exam  Vitals and nursing note reviewed.   Constitutional:       Appearance: Normal appearance. He is well-developed.   Cardiovascular:      Rate and Rhythm: Normal rate and regular rhythm.      Pulses:           Dorsalis pedis pulses are 1+ on the right side and 1+ on the left side.        Posterior tibial pulses are 1+ on the right side and 1+ on the left side.      Heart sounds: Normal heart sounds.   Pulmonary:      Effort: Pulmonary effort is normal.      Breath sounds: Normal breath sounds.   Feet:      Right foot:      Protective Sensation: 4 sites tested.  2 sites sensed.      Toenail Condition: Right toenails are abnormally thick. Fungal disease present.     Left foot:      Protective Sensation: 4 sites tested.  2 sites sensed.      Toenail Condition: Left toenails are abnormally thick. Fungal disease present.  Skin:     General: Skin is warm.      Capillary Refill: Capillary refill takes 2 to 3 seconds.   Neurological:      Mental Status: He is alert.      Sensory: Sensory deficit present.   Psychiatric:         Behavior: Behavior normal.         Thought Content: Thought content normal.         Judgment: Judgment normal.                                       Assessment:       1. Ingrown nail    2. Type 2 diabetes mellitus without complication, without long-term current use of insulin    3.  Comprehensive diabetic foot examination, type 2 DM, encounter for    4. Chronic atrial fibrillation        Plan:                 Patient presents today for follow-up diabetic evaluation.  Patient is being seen for follow-up diabetic evaluation and to address chronically ingrown toenails.  Patient states he is feeling a lot better he was placed on a new blood pressure medication which has subsequently helped his AFib and states he is feeling significantly better last time I saw him he had no energy and was not feeling well.  Patient had significantly incurvated ingrowing toenails medial lateral border bilateral hallux this required debridement of the ingrowing nail areas were not currently infected but were inflamed patient did have discomfort in these areas all previous surgical sites well healed and pain-free at this time.  I was able to successfully remove the ingrowing nail gave the patient considerable relief.  Comprehensive diabetic evaluation performed today patient does have a history of previous ulceration and skin breakdown areas of previous bone spur formation have been surgically address the patient states he is very pleased with the surgery these areas are no longer an issue however there is other areas that need to be monitored closely because he is at high risk.  Patient relates he is having a hip issue today he states it is very hard for him to get around and it has been did very difficult recently he is hopeful with time it will improve some he is currently doing therapy in the pool which I advised the patient he is usually very very beneficial.  Recommended follow-up is 2-3 months because of the chronically ingrown nails the patient has any problems questions or concerns sooner he is immediately.  Patient states he is very pleased with the surgeries on both feet which have relieved a lot of the pain he was previously having.  Patient does relate a recent fall.  This note was created using [employer]  voice recognition software that occasionally misinterpreted phrases or words.

## 2023-12-14 ENCOUNTER — HOSPITAL ENCOUNTER (OUTPATIENT)
Dept: RADIOLOGY | Facility: HOSPITAL | Age: 68
Discharge: HOME | End: 2023-12-14
Attending: NURSE PRACTITIONER
Payer: MEDICARE

## 2023-12-14 DIAGNOSIS — R06.00 DYSPNEA, UNSPECIFIED TYPE: ICD-10-CM

## 2023-12-14 DIAGNOSIS — J06.9 UPPER RESPIRATORY TRACT INFECTION, UNSPECIFIED TYPE: ICD-10-CM

## 2023-12-14 DIAGNOSIS — J02.9 SORE THROAT: ICD-10-CM

## 2023-12-14 PROCEDURE — 71046 X-RAY EXAM CHEST 2 VIEWS: CPT | Mod: 26,,, | Performed by: RADIOLOGY

## 2023-12-14 PROCEDURE — 71046 XR CHEST PA AND LATERAL: ICD-10-PCS | Mod: 26,,, | Performed by: RADIOLOGY

## 2023-12-14 PROCEDURE — 71046 X-RAY EXAM CHEST 2 VIEWS: CPT | Mod: TC

## 2023-12-21 ENCOUNTER — OFFICE VISIT (OUTPATIENT)
Dept: PODIATRY | Facility: CLINIC | Age: 68
End: 2023-12-21
Payer: MEDICARE

## 2023-12-21 VITALS
HEART RATE: 81 BPM | WEIGHT: 212 LBS | SYSTOLIC BLOOD PRESSURE: 132 MMHG | HEIGHT: 71 IN | BODY MASS INDEX: 29.68 KG/M2 | DIASTOLIC BLOOD PRESSURE: 85 MMHG

## 2023-12-21 DIAGNOSIS — E11.9 COMPREHENSIVE DIABETIC FOOT EXAMINATION, TYPE 2 DM, ENCOUNTER FOR: ICD-10-CM

## 2023-12-21 DIAGNOSIS — E11.9 TYPE 2 DIABETES MELLITUS WITHOUT COMPLICATION, WITHOUT LONG-TERM CURRENT USE OF INSULIN: ICD-10-CM

## 2023-12-21 DIAGNOSIS — L60.0 INGROWN NAIL: Primary | ICD-10-CM

## 2023-12-21 PROCEDURE — 99999 PR PBB SHADOW E&M-EST. PATIENT-LVL V: CPT | Mod: PBBFAC,,, | Performed by: PODIATRIST

## 2023-12-21 PROCEDURE — 99999 PR PBB SHADOW E&M-EST. PATIENT-LVL V: ICD-10-PCS | Mod: PBBFAC,,, | Performed by: PODIATRIST

## 2023-12-21 PROCEDURE — 99215 OFFICE O/P EST HI 40 MIN: CPT | Mod: PBBFAC,PN | Performed by: PODIATRIST

## 2023-12-21 PROCEDURE — 99213 OFFICE O/P EST LOW 20 MIN: CPT | Mod: S$PBB,,, | Performed by: PODIATRIST

## 2023-12-21 PROCEDURE — 99213 PR OFFICE/OUTPT VISIT, EST, LEVL III, 20-29 MIN: ICD-10-PCS | Mod: S$PBB,,, | Performed by: PODIATRIST

## 2023-12-22 NOTE — PROGRESS NOTES
Subjective:       Patient ID: Elmer Cunha is a 68 y.o. male.    Chief Complaint:      Patient presents today follow-up diabetic evaluation.  Follow-up  Associated symptoms include arthralgias and joint swelling.   Nail Problem  Associated symptoms include arthralgias and joint swelling.   Foot Pain  Associated symptoms include arthralgias and joint swelling.   Ingrown Toenail  Associated symptoms include arthralgias and joint swelling.     Review of Systems   Musculoskeletal:  Positive for arthralgias, gait problem and joint swelling.   All other systems reviewed and are negative.      Objective:      Physical Exam  Vitals and nursing note reviewed.   Constitutional:       Appearance: Normal appearance. He is well-developed.   Cardiovascular:      Rate and Rhythm: Normal rate and regular rhythm.      Pulses:           Dorsalis pedis pulses are 1+ on the right side and 1+ on the left side.        Posterior tibial pulses are 1+ on the right side and 1+ on the left side.      Heart sounds: Normal heart sounds.   Pulmonary:      Effort: Pulmonary effort is normal.      Breath sounds: Normal breath sounds.   Feet:      Right foot:      Protective Sensation: 4 sites tested.  2 sites sensed.      Toenail Condition: Right toenails are abnormally thick. Fungal disease present.     Left foot:      Protective Sensation: 4 sites tested.  2 sites sensed.      Toenail Condition: Left toenails are abnormally thick. Fungal disease present.  Skin:     General: Skin is warm.      Capillary Refill: Capillary refill takes 2 to 3 seconds.   Neurological:      Mental Status: He is alert.      Sensory: Sensory deficit present.   Psychiatric:         Behavior: Behavior normal.         Thought Content: Thought content normal.         Judgment: Judgment normal.                                                                 Assessment:       1. Ingrown nail    2. Type 2 diabetes mellitus without complication, without long-term current  use of insulin    3. Comprehensive diabetic foot examination, type 2 DM, encounter for        Plan:                 Patient presents today for follow-up diabetic evaluation.  Patient is being seen for follow-up diabetic evaluation and to address chronically ingrown toenails.  Patient states he is feeling a lot better he was placed on a new blood pressure medication which has subsequently helped his AFib and states he is feeling significantly better last time I saw him he had no energy and was not feeling well.  Patient had significantly incurvated ingrowing toenails medial lateral border bilateral hallux this required debridement of the ingrowing nail areas were not currently infected but were inflamed patient did have discomfort in these areas all previous surgical sites well healed and pain-free at this time.  I was able to successfully remove the ingrowing nail gave the patient considerable relief.  Comprehensive diabetic evaluation performed today patient does have a history of previous ulceration and skin breakdown areas of previous bone spur formation have been surgically address the patient states he is very pleased with the surgery these areas are no longer an issue however there is other areas that need to be monitored closely because he is at high risk.  Patient relates he is having a hip issue today he states it is very hard for him to get around and it has been did very difficult recently he is hopeful with time it will improve some he is currently doing therapy in the pool which I advised the patient he is usually very very beneficial.  Recommended follow-up is 2-3 months because of the chronically ingrown nails the patient has any problems questions or concerns sooner he is immediately.  Patient states he is very pleased with the surgeries on both feet which have relieved a lot of the pain he was previously having.  Patient states he is scheduled to have another nerve ablation performed in the  beginning of January.  This note was created using [employer] voice recognition software that occasionally misinterpreted phrases or words.

## 2024-01-02 ENCOUNTER — HOSPITAL ENCOUNTER (OUTPATIENT)
Dept: RADIOLOGY | Facility: HOSPITAL | Age: 69
Discharge: HOME | End: 2024-01-02
Attending: NURSE PRACTITIONER
Payer: MEDICARE

## 2024-01-02 DIAGNOSIS — R10.31 RIGHT GROIN PAIN: ICD-10-CM

## 2024-01-02 PROCEDURE — 76882 US LMTD JT/FCL EVL NVASC XTR: CPT | Mod: 26,RT,, | Performed by: RADIOLOGY

## 2024-01-02 PROCEDURE — 76882 US LMTD JT/FCL EVL NVASC XTR: CPT | Mod: TC,RT

## 2024-01-25 ENCOUNTER — HOSPITAL ENCOUNTER (OUTPATIENT)
Dept: RADIOLOGY | Facility: HOSPITAL | Age: 69
Discharge: HOME | End: 2024-01-25
Attending: NURSE PRACTITIONER
Payer: MEDICARE

## 2024-01-25 DIAGNOSIS — R10.31 RIGHT GROIN PAIN: ICD-10-CM

## 2024-01-25 PROCEDURE — 25500020 PHARM REV CODE 255: Performed by: NURSE PRACTITIONER

## 2024-01-25 PROCEDURE — 74177 CT ABD & PELVIS W/CONTRAST: CPT | Mod: 26,,, | Performed by: RADIOLOGY

## 2024-01-25 PROCEDURE — A9698 NON-RAD CONTRAST MATERIALNOC: HCPCS | Performed by: NURSE PRACTITIONER

## 2024-01-25 PROCEDURE — 74177 CT ABD & PELVIS W/CONTRAST: CPT | Mod: TC

## 2024-01-25 RX ADMIN — IOHEXOL 1000 ML: 9 SOLUTION ORAL at 10:01

## 2024-01-25 RX ADMIN — IOHEXOL 100 ML: 350 INJECTION, SOLUTION INTRAVENOUS at 11:01

## 2024-02-01 ENCOUNTER — OFFICE VISIT (OUTPATIENT)
Dept: PODIATRY | Facility: CLINIC | Age: 69
End: 2024-02-01
Payer: MEDICARE

## 2024-02-01 VITALS
WEIGHT: 214 LBS | BODY MASS INDEX: 29.96 KG/M2 | HEART RATE: 75 BPM | HEIGHT: 71 IN | SYSTOLIC BLOOD PRESSURE: 130 MMHG | DIASTOLIC BLOOD PRESSURE: 82 MMHG

## 2024-02-01 DIAGNOSIS — E11.9 COMPREHENSIVE DIABETIC FOOT EXAMINATION, TYPE 2 DM, ENCOUNTER FOR: ICD-10-CM

## 2024-02-01 DIAGNOSIS — E11.9 TYPE 2 DIABETES MELLITUS WITHOUT COMPLICATION, WITHOUT LONG-TERM CURRENT USE OF INSULIN: ICD-10-CM

## 2024-02-01 DIAGNOSIS — L60.0 INGROWN NAIL: Primary | ICD-10-CM

## 2024-02-01 PROCEDURE — 99999 PR PBB SHADOW E&M-EST. PATIENT-LVL V: CPT | Mod: PBBFAC,,, | Performed by: PODIATRIST

## 2024-02-01 PROCEDURE — 99213 OFFICE O/P EST LOW 20 MIN: CPT | Mod: S$PBB,,, | Performed by: PODIATRIST

## 2024-02-01 PROCEDURE — 99215 OFFICE O/P EST HI 40 MIN: CPT | Mod: PBBFAC,PN | Performed by: PODIATRIST

## 2024-02-04 NOTE — PROGRESS NOTES
Subjective:       Patient ID: Elmer Cunha is a 68 y.o. male.    Chief Complaint:      Patient presents today follow-up diabetic evaluation.  Follow-up  Associated symptoms include arthralgias and joint swelling.   Nail Problem  Associated symptoms include arthralgias and joint swelling.   Foot Pain  Associated symptoms include arthralgias and joint swelling.   Ingrown Toenail  Associated symptoms include arthralgias and joint swelling.     Review of Systems   Musculoskeletal:  Positive for arthralgias, gait problem and joint swelling.   All other systems reviewed and are negative.      Objective:      Physical Exam  Vitals and nursing note reviewed.   Constitutional:       Appearance: Normal appearance. He is well-developed.   Cardiovascular:      Rate and Rhythm: Normal rate and regular rhythm.      Pulses:           Dorsalis pedis pulses are 1+ on the right side and 1+ on the left side.        Posterior tibial pulses are 1+ on the right side and 1+ on the left side.      Heart sounds: Normal heart sounds.   Pulmonary:      Effort: Pulmonary effort is normal.      Breath sounds: Normal breath sounds.   Feet:      Right foot:      Protective Sensation: 4 sites tested.  2 sites sensed.      Toenail Condition: Right toenails are abnormally thick. Fungal disease present.     Left foot:      Protective Sensation: 4 sites tested.  2 sites sensed.      Toenail Condition: Left toenails are abnormally thick. Fungal disease present.  Skin:     General: Skin is warm.      Capillary Refill: Capillary refill takes 2 to 3 seconds.   Neurological:      Mental Status: He is alert.      Sensory: Sensory deficit present.   Psychiatric:         Behavior: Behavior normal.         Thought Content: Thought content normal.         Judgment: Judgment normal.                                                                                       Assessment:       1. Ingrown nail    2. Type 2 diabetes mellitus without complication,  without long-term current use of insulin    3. Comprehensive diabetic foot examination, type 2 DM, encounter for        Plan:                 Patient presents today for follow-up diabetic evaluation.  Patient is being seen for follow-up diabetic evaluation and to address chronically ingrown toenails.  Patient states he is feeling a lot better he was placed on a new blood pressure medication which has subsequently helped his AFib and states he is feeling significantly better last time I saw him he had no energy and was not feeling well.  Patient had significantly incurvated ingrowing toenails medial lateral border bilateral hallux this required debridement of the ingrowing nail areas were not currently infected but were inflamed patient did have discomfort in these areas all previous surgical sites well healed and pain-free at this time.  I was able to successfully remove the ingrowing nail gave the patient considerable relief.  Comprehensive diabetic evaluation performed today patient does have a history of previous ulceration and skin breakdown areas of previous bone spur formation have been surgically address the patient states he is very pleased with the surgery these areas are no longer an issue however there is other areas that need to be monitored closely because he is at high risk.  Patient relates he is having a hip issue today he states it is very hard for him to get around and it has been did very difficult recently he is hopeful with time it will improve some he is currently doing therapy in the pool which I advised the patient he is usually very very beneficial.  Recommended follow-up is 2-3 months because of the chronically ingrown nails the patient has any problems questions or concerns sooner he is immediately.  Patient states he is very pleased with the surgeries on both feet which have relieved a lot of the pain he was previously having.  Patient states the nerve ablation he had for his back only  helped him for about 3 days he also states he is now having right groin pain is going to be setting up an appointment to see Dr. Cohen to be evaluated.  This note was created using [employer] voice recognition software that occasionally misinterpreted phrases or words.

## 2024-03-14 ENCOUNTER — OFFICE VISIT (OUTPATIENT)
Dept: PODIATRY | Facility: CLINIC | Age: 69
End: 2024-03-14
Payer: MEDICARE

## 2024-03-14 VITALS
SYSTOLIC BLOOD PRESSURE: 127 MMHG | DIASTOLIC BLOOD PRESSURE: 88 MMHG | BODY MASS INDEX: 29.68 KG/M2 | WEIGHT: 212 LBS | HEIGHT: 71 IN | HEART RATE: 91 BPM

## 2024-03-14 DIAGNOSIS — L97.521 ULCER OF LEFT FOOT, LIMITED TO BREAKDOWN OF SKIN: ICD-10-CM

## 2024-03-14 DIAGNOSIS — L60.0 INGROWN NAIL: Primary | ICD-10-CM

## 2024-03-14 DIAGNOSIS — E11.9 TYPE 2 DIABETES MELLITUS WITHOUT COMPLICATION, WITHOUT LONG-TERM CURRENT USE OF INSULIN: ICD-10-CM

## 2024-03-14 DIAGNOSIS — E11.9 COMPREHENSIVE DIABETIC FOOT EXAMINATION, TYPE 2 DM, ENCOUNTER FOR: ICD-10-CM

## 2024-03-14 DIAGNOSIS — L98.9 SKIN LESION OF FOOT: ICD-10-CM

## 2024-03-14 DIAGNOSIS — R26.81 UNSTEADY GAIT: ICD-10-CM

## 2024-03-14 PROCEDURE — 99213 OFFICE O/P EST LOW 20 MIN: CPT | Mod: S$PBB,,, | Performed by: PODIATRIST

## 2024-03-14 PROCEDURE — 99215 OFFICE O/P EST HI 40 MIN: CPT | Mod: PBBFAC,PN | Performed by: PODIATRIST

## 2024-03-14 PROCEDURE — 99999 PR PBB SHADOW E&M-EST. PATIENT-LVL V: CPT | Mod: PBBFAC,,, | Performed by: PODIATRIST

## 2024-03-15 NOTE — PROGRESS NOTES
Subjective:       Patient ID: Elmer Cunha is a 68 y.o. male.    Chief Complaint:      Patient presents today follow-up diabetic evaluation.  Follow-up  Associated symptoms include arthralgias and joint swelling.   Nail Problem  Associated symptoms include arthralgias and joint swelling.   Foot Pain  Associated symptoms include arthralgias and joint swelling.   Ingrown Toenail  Associated symptoms include arthralgias and joint swelling.     Review of Systems   Musculoskeletal:  Positive for arthralgias, gait problem and joint swelling.   All other systems reviewed and are negative.      Objective:      Physical Exam  Vitals and nursing note reviewed.   Constitutional:       Appearance: Normal appearance. He is well-developed.   Cardiovascular:      Rate and Rhythm: Normal rate and regular rhythm.      Pulses:           Dorsalis pedis pulses are 1+ on the right side and 1+ on the left side.        Posterior tibial pulses are 1+ on the right side and 1+ on the left side.      Heart sounds: Normal heart sounds.   Pulmonary:      Effort: Pulmonary effort is normal.      Breath sounds: Normal breath sounds.   Feet:      Right foot:      Protective Sensation: 4 sites tested.  2 sites sensed.      Toenail Condition: Right toenails are abnormally thick. Fungal disease present.     Left foot:      Protective Sensation: 4 sites tested.  2 sites sensed.      Toenail Condition: Left toenails are abnormally thick. Fungal disease present.  Skin:     General: Skin is warm.      Capillary Refill: Capillary refill takes 2 to 3 seconds.   Neurological:      Mental Status: He is alert.      Sensory: Sensory deficit present.   Psychiatric:         Behavior: Behavior normal.         Thought Content: Thought content normal.         Judgment: Judgment normal.                                                                                                     Assessment:       1. Ingrown nail    2. Skin lesion of foot    3. Type 2  diabetes mellitus without complication, without long-term current use of insulin    4. Comprehensive diabetic foot examination, type 2 DM, encounter for    5. Ulcer of left foot, limited to breakdown of skin    6. Unsteady gait        Plan:                 Patient presents today for follow-up diabetic evaluation.  Patient is being seen for follow-up diabetic evaluation and to address chronically ingrown toenails.  Patient had significantly incurvated ingrowing toenails medial lateral border bilateral hallux this required debridement of the ingrowing nail areas were not currently infected but were inflamed patient did have discomfort in these areas all previous surgical sites well healed and pain-free at this time.  I was able to successfully remove the ingrowing nail gave the patient considerable relief.  Comprehensive diabetic evaluation performed today patient does have a history of previous ulceration and skin breakdown areas of previous bone spur formation have been surgically address the patient states he is very pleased with the surgery these areas are no longer an issue however there is other areas that need to be monitored closely because he is at high risk.  Recommended follow-up is 2-3 months because of the chronically ingrown nails the patient has any problems questions or concerns sooner he is immediately.  Patient states he is very pleased with the surgeries on both feet which have relieved a lot of the pain he was previously having.  Patient states the area where he was going to have hernia surgery has subsequently gone down it is not bothering him.  Patient states at this point he has not going to have surgery to address the hernia he has greater concerns about his back issues and states he may need back surgery if the injections do not give him significant relief.  Patient had a very small superficial area of ulceration distal 4th digit left this was debrided there has no signs of infection no  inflammation noted this is just something to monitor patient states it had not been bothering him.  This note was created using [employer] voice recognition software that occasionally misinterpreted phrases or words.

## 2024-04-04 NOTE — TELEPHONE ENCOUNTER
----- Message from Cookie Wild sent at 6/17/2019 12:57 PM CDT -----  Contact: [phone]  Patient requesting appointment 6/18/19 due to nails trimmed, patient requesting to be worked in.   Please call patient at [phone]. Thanks!     04-Apr-2024 18:30

## 2024-04-25 ENCOUNTER — OFFICE VISIT (OUTPATIENT)
Dept: PODIATRY | Facility: CLINIC | Age: 69
End: 2024-04-25
Payer: MEDICARE

## 2024-04-25 VITALS
DIASTOLIC BLOOD PRESSURE: 90 MMHG | SYSTOLIC BLOOD PRESSURE: 127 MMHG | BODY MASS INDEX: 29.69 KG/M2 | WEIGHT: 212.06 LBS | HEIGHT: 71 IN | HEART RATE: 80 BPM

## 2024-04-25 DIAGNOSIS — L60.0 INGROWN NAIL: Primary | ICD-10-CM

## 2024-04-25 DIAGNOSIS — E11.9 TYPE 2 DIABETES MELLITUS WITHOUT COMPLICATION, WITHOUT LONG-TERM CURRENT USE OF INSULIN: ICD-10-CM

## 2024-04-25 DIAGNOSIS — E11.9 COMPREHENSIVE DIABETIC FOOT EXAMINATION, TYPE 2 DM, ENCOUNTER FOR: ICD-10-CM

## 2024-04-25 PROCEDURE — 99213 OFFICE O/P EST LOW 20 MIN: CPT | Mod: S$PBB,,, | Performed by: PODIATRIST

## 2024-04-25 PROCEDURE — 99999 PR PBB SHADOW E&M-EST. PATIENT-LVL III: CPT | Mod: PBBFAC,,, | Performed by: PODIATRIST

## 2024-04-25 PROCEDURE — 99213 OFFICE O/P EST LOW 20 MIN: CPT | Mod: PBBFAC,PN | Performed by: PODIATRIST

## 2024-04-25 NOTE — PROGRESS NOTES
Subjective:       Patient ID: Elmer Cunha is a 68 y.o. male.    Chief Complaint:      Patient presents today follow-up diabetic evaluation.  Follow-up  Associated symptoms include arthralgias and joint swelling.   Nail Problem  Associated symptoms include arthralgias and joint swelling.   Foot Pain  Associated symptoms include arthralgias and joint swelling.   Ingrown Toenail  Associated symptoms include arthralgias and joint swelling.     Review of Systems   Musculoskeletal:  Positive for arthralgias, gait problem and joint swelling.   All other systems reviewed and are negative.      Objective:      Physical Exam  Vitals and nursing note reviewed.   Constitutional:       Appearance: Normal appearance. He is well-developed.   Cardiovascular:      Rate and Rhythm: Normal rate and regular rhythm.      Pulses:           Dorsalis pedis pulses are 1+ on the right side and 1+ on the left side.        Posterior tibial pulses are 1+ on the right side and 1+ on the left side.      Heart sounds: Normal heart sounds.   Pulmonary:      Effort: Pulmonary effort is normal.      Breath sounds: Normal breath sounds.   Feet:      Right foot:      Protective Sensation: 4 sites tested.  2 sites sensed.      Toenail Condition: Right toenails are abnormally thick. Fungal disease present.     Left foot:      Protective Sensation: 4 sites tested.  2 sites sensed.      Toenail Condition: Left toenails are abnormally thick. Fungal disease present.  Skin:     General: Skin is warm.      Capillary Refill: Capillary refill takes 2 to 3 seconds.   Neurological:      Mental Status: He is alert.      Sensory: Sensory deficit present.   Psychiatric:         Behavior: Behavior normal.         Thought Content: Thought content normal.         Judgment: Judgment normal.                                                                                                                               Assessment:       1. Ingrown nail    2. Type 2  diabetes mellitus without complication, without long-term current use of insulin    3. Comprehensive diabetic foot examination, type 2 DM, encounter for        Plan:                 Patient presents today for follow-up diabetic evaluation.  Patient is being seen for follow-up diabetic evaluation and to address chronically ingrown toenails.  Patient had significantly incurvated ingrowing toenails medial lateral border bilateral hallux this required debridement of the ingrowing nail areas were not currently infected but were inflamed patient did have discomfort in these areas all previous surgical sites well healed and pain-free at this time.  I was able to successfully remove the ingrowing nail gave the patient considerable relief.  Comprehensive diabetic evaluation performed today patient does have a history of previous ulceration and skin breakdown areas of previous bone spur formation have been surgically address the patient states he is very pleased with the surgery these areas are no longer an issue however there is other areas that need to be monitored closely because he is at high risk.  Recommended follow-up is 2-3 months because of the chronically ingrown nails the patient has any problems questions or concerns sooner he is immediately.  Patient states he is very pleased with the surgeries on both feet which have relieved a lot of the pain he was previously having.  Patient states the area where he was going to have hernia surgery has subsequently gone down it is not bothering him.  Patient states at this point he has not going to have surgery to address the hernia he has greater concerns about his back issues and states he may need back surgery if the injections do not give him significant relief.  Patient had a very small superficial area of ulceration distal 4th digit left this was debrided there has no signs of infection no inflammation noted this is just something to monitor patient states it had not  been bothering him.  Patient states his most recent back injection has been working very well for the past 2 and half weeks.  This note was created using [employer] voice recognition software that occasionally misinterpreted phrases or words.

## 2024-04-30 NOTE — TELEPHONE ENCOUNTER
Pt calling about B/P 106/90 and he said that he was seen in the office a couple of days ago for SOB. Pts HR was fast and was told to take 2 toprol 25 mg and this am b/p was as above he is not SOb anymore but wondering what to do. Pts cards Md is non ochnser but I did call out to  on call Katia Ziegler and she suggested to 1/5 the dose and only take one pill but pt was called back and told to call cardiologist as well to see what he recommended but that was her recommendation and to follow up with Md on Monday. Pt to call back if any other questions or concerns              Reason for Disposition   [1] Caller has URGENT medicine question about med that PCP or specialist prescribed AND [2] triager unable to answer question    Protocols used: Medication Question Call-A-     The patient is a 64y Male complaining of chest pain.

## 2024-06-13 ENCOUNTER — OFFICE VISIT (OUTPATIENT)
Dept: PODIATRY | Facility: CLINIC | Age: 69
End: 2024-06-13
Payer: MEDICARE

## 2024-06-13 VITALS
BODY MASS INDEX: 29.69 KG/M2 | HEART RATE: 76 BPM | SYSTOLIC BLOOD PRESSURE: 124 MMHG | HEIGHT: 71 IN | DIASTOLIC BLOOD PRESSURE: 85 MMHG | WEIGHT: 212.06 LBS

## 2024-06-13 DIAGNOSIS — E11.9 TYPE 2 DIABETES MELLITUS WITHOUT COMPLICATION, WITHOUT LONG-TERM CURRENT USE OF INSULIN: ICD-10-CM

## 2024-06-13 DIAGNOSIS — E11.9 COMPREHENSIVE DIABETIC FOOT EXAMINATION, TYPE 2 DM, ENCOUNTER FOR: ICD-10-CM

## 2024-06-13 DIAGNOSIS — L60.0 INGROWN NAIL: Primary | ICD-10-CM

## 2024-06-13 PROCEDURE — 99213 OFFICE O/P EST LOW 20 MIN: CPT | Mod: S$PBB,,, | Performed by: PODIATRIST

## 2024-06-13 PROCEDURE — 99215 OFFICE O/P EST HI 40 MIN: CPT | Mod: PBBFAC,PN | Performed by: PODIATRIST

## 2024-06-13 PROCEDURE — 99999 PR PBB SHADOW E&M-EST. PATIENT-LVL V: CPT | Mod: PBBFAC,,, | Performed by: PODIATRIST

## 2024-06-13 RX ORDER — PANTOPRAZOLE SODIUM 40 MG/1
40 TABLET, DELAYED RELEASE ORAL
COMMUNITY
Start: 2024-05-30

## 2024-06-13 RX ORDER — ONDANSETRON 4 MG/1
4 TABLET, FILM COATED ORAL EVERY 8 HOURS PRN
COMMUNITY

## 2024-06-13 NOTE — PROGRESS NOTES
Subjective:       Patient ID: Elmer Cunha is a 68 y.o. male.    Chief Complaint:      Patient presents today follow-up diabetic evaluation.  Follow-up  Associated symptoms include arthralgias and joint swelling.   Nail Problem  Associated symptoms include arthralgias and joint swelling.   Foot Pain  Associated symptoms include arthralgias and joint swelling.   Ingrown Toenail  Associated symptoms include arthralgias and joint swelling.     Review of Systems   Musculoskeletal:  Positive for arthralgias, gait problem and joint swelling.   All other systems reviewed and are negative.      Objective:      Physical Exam  Vitals and nursing note reviewed.   Constitutional:       Appearance: Normal appearance. He is well-developed.   Cardiovascular:      Rate and Rhythm: Normal rate and regular rhythm.      Pulses:           Dorsalis pedis pulses are 1+ on the right side and 1+ on the left side.        Posterior tibial pulses are 1+ on the right side and 1+ on the left side.      Heart sounds: Normal heart sounds.   Pulmonary:      Effort: Pulmonary effort is normal.      Breath sounds: Normal breath sounds.   Feet:      Right foot:      Protective Sensation: 4 sites tested.  2 sites sensed.      Toenail Condition: Right toenails are abnormally thick. Fungal disease present.     Left foot:      Protective Sensation: 4 sites tested.  2 sites sensed.      Toenail Condition: Left toenails are abnormally thick. Fungal disease present.  Skin:     General: Skin is warm.      Capillary Refill: Capillary refill takes 2 to 3 seconds.   Neurological:      Mental Status: He is alert.      Sensory: Sensory deficit present.   Psychiatric:         Behavior: Behavior normal.         Thought Content: Thought content normal.         Judgment: Judgment normal.                                                                                                                               Assessment:       1. Ingrown nail    2. Type 2  diabetes mellitus without complication, without long-term current use of insulin    3. Comprehensive diabetic foot examination, type 2 DM, encounter for        Plan:                 Patient presents today for follow-up diabetic evaluation.  Patient is being seen for follow-up diabetic evaluation and to address chronically ingrown toenails.  Patient had significantly incurvated ingrowing toenails medial lateral border bilateral hallux this required debridement of the ingrowing nail areas were not currently infected but were inflamed patient did have discomfort in these areas all previous surgical sites well healed and pain-free at this time.  I was able to successfully remove the ingrowing nail gave the patient considerable relief.  Comprehensive diabetic evaluation performed today patient does have a history of previous ulceration and skin breakdown areas of previous bone spur formation have been surgically address the patient states he is very pleased with the surgery these areas are no longer an issue however there is other areas that need to be monitored closely because he is at high risk.  Recommended follow-up is 2-3 months because of the chronically ingrown nails the patient has any problems questions or concerns sooner he is immediately.  Patient states he is very pleased with the surgeries on both feet which have relieved a lot of the pain he was previously having.  Patient states the area where he was going to have hernia surgery has subsequently gone down it is not bothering him.  Patient states he has to follow-up for a colonoscopy with General surgery.  This note was created using [employer] voice recognition software that occasionally misinterpreted phrases or words.

## 2024-08-13 ENCOUNTER — OFFICE VISIT (OUTPATIENT)
Dept: PODIATRY | Facility: CLINIC | Age: 69
End: 2024-08-13
Payer: MEDICARE

## 2024-08-13 VITALS
DIASTOLIC BLOOD PRESSURE: 82 MMHG | SYSTOLIC BLOOD PRESSURE: 118 MMHG | BODY MASS INDEX: 29.69 KG/M2 | HEART RATE: 77 BPM | WEIGHT: 212.06 LBS | HEIGHT: 71 IN

## 2024-08-13 DIAGNOSIS — E11.9 COMPREHENSIVE DIABETIC FOOT EXAMINATION, TYPE 2 DM, ENCOUNTER FOR: ICD-10-CM

## 2024-08-13 DIAGNOSIS — E11.9 TYPE 2 DIABETES MELLITUS WITHOUT COMPLICATION, WITHOUT LONG-TERM CURRENT USE OF INSULIN: ICD-10-CM

## 2024-08-13 DIAGNOSIS — L60.0 INGROWN NAIL: Primary | ICD-10-CM

## 2024-08-13 PROCEDURE — 99999 PR PBB SHADOW E&M-EST. PATIENT-LVL V: CPT | Mod: PBBFAC,,, | Performed by: PODIATRIST

## 2024-08-13 PROCEDURE — 99215 OFFICE O/P EST HI 40 MIN: CPT | Mod: PBBFAC,PN | Performed by: PODIATRIST

## 2024-08-13 PROCEDURE — 99213 OFFICE O/P EST LOW 20 MIN: CPT | Mod: S$PBB,,, | Performed by: PODIATRIST

## 2024-08-14 NOTE — PROGRESS NOTES
Subjective:       Patient ID: Elmer Cunha is a 68 y.o. male.    Chief Complaint:      Patient presents today follow-up diabetic evaluation.  Follow-up  Associated symptoms include arthralgias and joint swelling.   Nail Problem  Associated symptoms include arthralgias and joint swelling.   Foot Pain  Associated symptoms include arthralgias and joint swelling.   Ingrown Toenail  Associated symptoms include arthralgias and joint swelling.     Review of Systems   Musculoskeletal:  Positive for arthralgias, gait problem and joint swelling.   All other systems reviewed and are negative.      Objective:      Physical Exam  Vitals and nursing note reviewed.   Constitutional:       Appearance: Normal appearance. He is well-developed.   Cardiovascular:      Rate and Rhythm: Normal rate and regular rhythm.      Pulses:           Dorsalis pedis pulses are 1+ on the right side and 1+ on the left side.        Posterior tibial pulses are 1+ on the right side and 1+ on the left side.      Heart sounds: Normal heart sounds.   Pulmonary:      Effort: Pulmonary effort is normal.      Breath sounds: Normal breath sounds.   Feet:      Right foot:      Protective Sensation: 4 sites tested.  2 sites sensed.      Toenail Condition: Right toenails are abnormally thick. Fungal disease present.     Left foot:      Protective Sensation: 4 sites tested.  2 sites sensed.      Toenail Condition: Left toenails are abnormally thick. Fungal disease present.  Skin:     General: Skin is warm.      Capillary Refill: Capillary refill takes 2 to 3 seconds.   Neurological:      Mental Status: He is alert.      Sensory: Sensory deficit present.   Psychiatric:         Behavior: Behavior normal.         Thought Content: Thought content normal.         Judgment: Judgment normal.                                     Assessment:       1. Ingrown nail    2. Type 2 diabetes mellitus without complication, without long-term current use of insulin    3.  Comprehensive diabetic foot examination, type 2 DM, encounter for        Plan:                 Patient presents today for follow-up diabetic evaluation.  Patient is being seen for follow-up diabetic evaluation and to address chronically ingrown toenails.  Patient had significantly incurvated ingrowing toenails medial lateral border bilateral hallux this required debridement of the ingrowing nail areas were not currently infected but were inflamed patient did have discomfort in these areas all previous surgical sites well healed and pain-free at this time.  I was able to successfully remove the ingrowing nail gave the patient considerable relief.  Comprehensive diabetic evaluation performed today patient does have a history of previous ulceration and skin breakdown areas of previous bone spur formation have been surgically address the patient states he is very pleased with the surgery these areas are no longer an issue however there is other areas that need to be monitored closely because he is at high risk.  Recommended follow-up is 2-3 months because of the chronically ingrown nails the patient has any problems questions or concerns sooner he is immediately.  Patient states he is very pleased with the surgeries on both feet which have relieved a lot of the pain he was previously having.  This note was created using [employer] voice recognition software that occasionally misinterpreted phrases or words.

## 2024-10-15 ENCOUNTER — OFFICE VISIT (OUTPATIENT)
Dept: PODIATRY | Facility: CLINIC | Age: 69
End: 2024-10-15
Payer: MEDICARE

## 2024-10-15 VITALS
WEIGHT: 212.06 LBS | HEIGHT: 71 IN | BODY MASS INDEX: 29.69 KG/M2 | HEART RATE: 82 BPM | SYSTOLIC BLOOD PRESSURE: 133 MMHG | DIASTOLIC BLOOD PRESSURE: 77 MMHG

## 2024-10-15 DIAGNOSIS — E11.9 COMPREHENSIVE DIABETIC FOOT EXAMINATION, TYPE 2 DM, ENCOUNTER FOR: ICD-10-CM

## 2024-10-15 DIAGNOSIS — E11.9 TYPE 2 DIABETES MELLITUS WITHOUT COMPLICATION, WITHOUT LONG-TERM CURRENT USE OF INSULIN: ICD-10-CM

## 2024-10-15 DIAGNOSIS — L60.0 INGROWN NAIL: Primary | ICD-10-CM

## 2024-10-15 PROCEDURE — 99215 OFFICE O/P EST HI 40 MIN: CPT | Mod: PBBFAC,PN | Performed by: PODIATRIST

## 2024-10-15 PROCEDURE — 99213 OFFICE O/P EST LOW 20 MIN: CPT | Mod: S$PBB,,, | Performed by: PODIATRIST

## 2024-10-15 PROCEDURE — 99999 PR PBB SHADOW E&M-EST. PATIENT-LVL V: CPT | Mod: PBBFAC,,, | Performed by: PODIATRIST

## 2024-10-15 RX ORDER — SULFAMETHOXAZOLE AND TRIMETHOPRIM 800; 160 MG/1; MG/1
TABLET ORAL
COMMUNITY
Start: 2024-10-07 | End: 2024-10-17

## 2024-10-16 NOTE — PROGRESS NOTES
Subjective:       Patient ID: Elmer Cunha is a 69 y.o. male.    Chief Complaint:      Patient presents today follow-up diabetic evaluation.  Follow-up  Associated symptoms include arthralgias and joint swelling.   Nail Problem  Associated symptoms include arthralgias and joint swelling.   Foot Pain  Associated symptoms include arthralgias and joint swelling.   Ingrown Toenail  Associated symptoms include arthralgias and joint swelling.     Review of Systems   Musculoskeletal:  Positive for arthralgias, gait problem and joint swelling.   All other systems reviewed and are negative.      Objective:      Physical Exam  Vitals and nursing note reviewed.   Constitutional:       Appearance: Normal appearance. He is well-developed.   Cardiovascular:      Rate and Rhythm: Normal rate and regular rhythm.      Pulses:           Dorsalis pedis pulses are 1+ on the right side and 1+ on the left side.        Posterior tibial pulses are 1+ on the right side and 1+ on the left side.      Heart sounds: Normal heart sounds.   Pulmonary:      Effort: Pulmonary effort is normal.      Breath sounds: Normal breath sounds.   Feet:      Right foot:      Protective Sensation: 4 sites tested.  2 sites sensed.      Toenail Condition: Right toenails are abnormally thick. Fungal disease present.     Left foot:      Protective Sensation: 4 sites tested.  2 sites sensed.      Toenail Condition: Left toenails are abnormally thick. Fungal disease present.  Skin:     General: Skin is warm.      Capillary Refill: Capillary refill takes 2 to 3 seconds.   Neurological:      Mental Status: He is alert.      Sensory: Sensory deficit present.   Psychiatric:         Behavior: Behavior normal.         Thought Content: Thought content normal.         Judgment: Judgment normal.                                                       Assessment:       1. Ingrown nail    2. Type 2 diabetes mellitus without complication, without long-term current use of  insulin    3. Comprehensive diabetic foot examination, type 2 DM, encounter for        Plan:                 Patient presents today for follow-up diabetic evaluation.  Patient is being seen for follow-up diabetic evaluation and to address chronically ingrown toenails.  Patient had significantly incurvated ingrowing toenails medial lateral border bilateral hallux this required debridement of the ingrowing nail areas were not currently infected but were inflamed patient did have discomfort in these areas all previous surgical sites well healed and pain-free at this time.  I was able to successfully remove the ingrowing nail gave the patient considerable relief.  Comprehensive diabetic evaluation performed today patient does have a history of previous ulceration and skin breakdown areas of previous bone spur formation have been surgically address the patient states he is very pleased with the surgery these areas are no longer an issue however there is other areas that need to be monitored closely because he is at high risk.  Recommended follow-up is 2-3 months because of the chronically ingrown nails the patient has any problems questions or concerns sooner he is immediately.  Patient states he is very pleased with the surgeries on both feet which have relieved a lot of the pain he was previously having.  Patient relates he has been dealing with a swollen prostrate.  Patient did have 2 areas of irritation and blistering on the bottom of the left foot he states this is because he has been itching and scratching the area he wanted to make sure I was aware this was not any trauma or injury but it was him scratching the area.  No signs of infection noted in and around these blistered areas patient will monitor these closely.  This note was created using [employer] voice recognition software that occasionally misinterpreted phrases or words.

## 2024-12-03 ENCOUNTER — OFFICE VISIT (OUTPATIENT)
Dept: PODIATRY | Facility: CLINIC | Age: 69
End: 2024-12-03
Payer: MEDICARE

## 2024-12-03 VITALS
HEART RATE: 79 BPM | SYSTOLIC BLOOD PRESSURE: 126 MMHG | BODY MASS INDEX: 29.69 KG/M2 | DIASTOLIC BLOOD PRESSURE: 83 MMHG | HEIGHT: 71 IN | WEIGHT: 212.06 LBS

## 2024-12-03 DIAGNOSIS — L60.0 INGROWN NAIL: Primary | ICD-10-CM

## 2024-12-03 DIAGNOSIS — E11.9 COMPREHENSIVE DIABETIC FOOT EXAMINATION, TYPE 2 DM, ENCOUNTER FOR: ICD-10-CM

## 2024-12-03 DIAGNOSIS — E11.9 TYPE 2 DIABETES MELLITUS WITHOUT COMPLICATION, WITHOUT LONG-TERM CURRENT USE OF INSULIN: ICD-10-CM

## 2024-12-03 PROCEDURE — 99999 PR PBB SHADOW E&M-EST. PATIENT-LVL V: CPT | Mod: PBBFAC,,, | Performed by: PODIATRIST

## 2024-12-03 PROCEDURE — 99213 OFFICE O/P EST LOW 20 MIN: CPT | Mod: S$PBB,,, | Performed by: PODIATRIST

## 2024-12-03 PROCEDURE — 99215 OFFICE O/P EST HI 40 MIN: CPT | Mod: PBBFAC,PN | Performed by: PODIATRIST

## 2024-12-05 NOTE — PROGRESS NOTES
Subjective:       Patient ID: Elmer Cunha is a 69 y.o. male.    Chief Complaint:      Patient presents today follow-up diabetic evaluation.  Follow-up  Associated symptoms include arthralgias and joint swelling.   Nail Problem  Associated symptoms include arthralgias and joint swelling.   Foot Pain  Associated symptoms include arthralgias and joint swelling.   Ingrown Toenail  Associated symptoms include arthralgias and joint swelling.     Review of Systems   Musculoskeletal:  Positive for arthralgias, gait problem and joint swelling.   All other systems reviewed and are negative.      Objective:      Physical Exam  Vitals and nursing note reviewed.   Constitutional:       Appearance: Normal appearance. He is well-developed.   Cardiovascular:      Rate and Rhythm: Normal rate and regular rhythm.      Pulses:           Dorsalis pedis pulses are 1+ on the right side and 1+ on the left side.        Posterior tibial pulses are 1+ on the right side and 1+ on the left side.      Heart sounds: Normal heart sounds.   Pulmonary:      Effort: Pulmonary effort is normal.      Breath sounds: Normal breath sounds.   Feet:      Right foot:      Protective Sensation: 4 sites tested.  2 sites sensed.      Toenail Condition: Right toenails are abnormally thick. Fungal disease present.     Left foot:      Protective Sensation: 4 sites tested.  2 sites sensed.      Toenail Condition: Left toenails are abnormally thick. Fungal disease present.  Skin:     General: Skin is warm.      Capillary Refill: Capillary refill takes 2 to 3 seconds.   Neurological:      Mental Status: He is alert.      Sensory: Sensory deficit present.   Psychiatric:         Behavior: Behavior normal.         Thought Content: Thought content normal.         Judgment: Judgment normal.                                                                     Assessment:       1. Ingrown nail    2. Type 2 diabetes mellitus without complication, without long-term  current use of insulin    3. Comprehensive diabetic foot examination, type 2 DM, encounter for        Plan:                 Patient presents today for follow-up diabetic evaluation.  Patient is being seen for follow-up diabetic evaluation and to address chronically ingrown toenails.  Patient had significantly incurvated ingrowing toenails medial lateral border bilateral hallux this required debridement of the ingrowing nail areas were not currently infected but were inflamed patient did have discomfort in these areas all previous surgical sites well healed and pain-free at this time.  I was able to successfully remove the ingrowing nail gave the patient considerable relief.  Comprehensive diabetic evaluation performed today patient does have a history of previous ulceration and skin breakdown areas of previous bone spur formation have been surgically address the patient states he is very pleased with the surgery these areas are no longer an issue however there is other areas that need to be monitored closely because he is at high risk.  Recommended follow-up is 2-3 months because of the chronically ingrown nails the patient has any problems questions or concerns sooner he is immediately.  Patient states he is very pleased with the surgeries on both feet which have relieved a lot of the pain he was previously having.   No signs of infection noted in and around these blistered areas patient will monitor these closely.  Patient states he has continued to have significant back problems he is meeting with the neurosurgeon on the 18th of December to discuss back surgery which has likely his next step.  This note was created using [employer] voice recognition software that occasionally misinterpreted phrases or words.

## 2025-02-20 ENCOUNTER — OFFICE VISIT (OUTPATIENT)
Dept: PODIATRY | Facility: CLINIC | Age: 70
End: 2025-02-20
Payer: MEDICARE

## 2025-02-20 VITALS
HEART RATE: 70 BPM | HEIGHT: 71 IN | BODY MASS INDEX: 29.69 KG/M2 | SYSTOLIC BLOOD PRESSURE: 136 MMHG | WEIGHT: 212.06 LBS | DIASTOLIC BLOOD PRESSURE: 80 MMHG

## 2025-02-20 DIAGNOSIS — M19.079 OSTEOARTHRITIS OF ANKLE AND FOOT, UNSPECIFIED LATERALITY: ICD-10-CM

## 2025-02-20 DIAGNOSIS — E11.9 COMPREHENSIVE DIABETIC FOOT EXAMINATION, TYPE 2 DM, ENCOUNTER FOR: ICD-10-CM

## 2025-02-20 DIAGNOSIS — R26.81 UNSTEADY GAIT: ICD-10-CM

## 2025-02-20 DIAGNOSIS — L60.0 INGROWN NAIL: Primary | ICD-10-CM

## 2025-02-20 DIAGNOSIS — E11.9 TYPE 2 DIABETES MELLITUS WITHOUT COMPLICATION, WITHOUT LONG-TERM CURRENT USE OF INSULIN: ICD-10-CM

## 2025-02-20 PROCEDURE — 99214 OFFICE O/P EST MOD 30 MIN: CPT | Mod: PBBFAC,PN | Performed by: PODIATRIST

## 2025-02-21 NOTE — PROGRESS NOTES
Subjective:       Patient ID: Elmer Cunha is a 69 y.o. male.    Chief Complaint:      Patient presents today follow-up diabetic evaluation.  Follow-up  Associated symptoms include arthralgias and joint swelling.   Nail Problem  Associated symptoms include arthralgias and joint swelling.   Foot Pain  Associated symptoms include arthralgias and joint swelling.   Ingrown Toenail  Associated symptoms include arthralgias and joint swelling.     Review of Systems   Musculoskeletal:  Positive for arthralgias, gait problem and joint swelling.   All other systems reviewed and are negative.      Objective:      Physical Exam  Vitals and nursing note reviewed.   Constitutional:       Appearance: Normal appearance. He is well-developed.   Cardiovascular:      Rate and Rhythm: Normal rate and regular rhythm.      Pulses:           Dorsalis pedis pulses are 1+ on the right side and 1+ on the left side.        Posterior tibial pulses are 1+ on the right side and 1+ on the left side.      Heart sounds: Normal heart sounds.   Pulmonary:      Effort: Pulmonary effort is normal.      Breath sounds: Normal breath sounds.   Feet:      Right foot:      Protective Sensation: 4 sites tested.  2 sites sensed.      Toenail Condition: Right toenails are abnormally thick. Fungal disease present.     Left foot:      Protective Sensation: 4 sites tested.  2 sites sensed.      Toenail Condition: Left toenails are abnormally thick. Fungal disease present.  Skin:     General: Skin is warm.      Capillary Refill: Capillary refill takes 2 to 3 seconds.   Neurological:      Mental Status: He is alert.      Sensory: Sensory deficit present.   Psychiatric:         Behavior: Behavior normal.         Thought Content: Thought content normal.         Judgment: Judgment normal.                                                                           Assessment:       1. Ingrown nail    2. Type 2 diabetes mellitus without complication, without long-term  current use of insulin    3. Comprehensive diabetic foot examination, type 2 DM, encounter for    4. Osteoarthritis of ankle and foot, unspecified laterality    5. Unsteady gait        Plan:                 Patient presents today for follow-up diabetic evaluation.  Patient is being seen for follow-up diabetic evaluation and to address chronically ingrown toenails.  Patient had significantly incurvated ingrowing toenails medial lateral border bilateral hallux this required debridement of the ingrowing nail areas were not currently infected but were inflamed patient did have discomfort in these areas all previous surgical sites well healed and pain-free at this time.  I was able to successfully remove the ingrowing nail gave the patient considerable relief.  Comprehensive diabetic evaluation performed today patient does have a history of previous ulceration and skin breakdown areas of previous bone spur formation have been surgically address the patient states he is very pleased with the surgery these areas are no longer an issue however there is other areas that need to be monitored closely because he is at high risk.  Recommended follow-up is 2-3 months because of the chronically ingrown nails the patient has any problems questions or concerns sooner he is immediately.  Patient states he is very pleased with the surgeries on both feet which have relieved a lot of the pain he was previously having.   Patient indicated today his back surgery has been delayed he stated that there is a backlog of surgery cases and they wanted him to go into a nursing home facility for rehab that he was not interested in attending so he stated that he would wait it out and give him more time until he can get the desired placement in rehab that he prefers.  Patient states his back has let up a little bit but ultimately he still believes he is going to need back surgery in the future.  This note was created using [employer] voice recognition  software that occasionally misinterpreted phrases or words.

## 2025-04-10 ENCOUNTER — OFFICE VISIT (OUTPATIENT)
Dept: PODIATRY | Facility: CLINIC | Age: 70
End: 2025-04-10
Payer: MEDICARE

## 2025-04-10 VITALS
DIASTOLIC BLOOD PRESSURE: 84 MMHG | HEIGHT: 71 IN | SYSTOLIC BLOOD PRESSURE: 140 MMHG | HEART RATE: 93 BPM | WEIGHT: 212.06 LBS | BODY MASS INDEX: 29.69 KG/M2

## 2025-04-10 DIAGNOSIS — E11.9 COMPREHENSIVE DIABETIC FOOT EXAMINATION, TYPE 2 DM, ENCOUNTER FOR: ICD-10-CM

## 2025-04-10 DIAGNOSIS — L60.0 INGROWN NAIL: Primary | ICD-10-CM

## 2025-04-10 DIAGNOSIS — E11.9 TYPE 2 DIABETES MELLITUS WITHOUT COMPLICATION, WITHOUT LONG-TERM CURRENT USE OF INSULIN: ICD-10-CM

## 2025-04-10 PROCEDURE — 99999 PR PBB SHADOW E&M-EST. PATIENT-LVL V: CPT | Mod: PBBFAC,,, | Performed by: PODIATRIST

## 2025-04-10 PROCEDURE — 99213 OFFICE O/P EST LOW 20 MIN: CPT | Mod: S$PBB,,, | Performed by: PODIATRIST

## 2025-04-10 PROCEDURE — 99215 OFFICE O/P EST HI 40 MIN: CPT | Mod: PBBFAC,PN | Performed by: PODIATRIST

## 2025-04-10 RX ORDER — OXYCODONE AND ACETAMINOPHEN 10; 325 MG/1; MG/1
1 TABLET ORAL EVERY 6 HOURS PRN
COMMUNITY
Start: 2025-03-19

## 2025-04-10 RX ORDER — METHOCARBAMOL 750 MG/1
750 TABLET, FILM COATED ORAL
COMMUNITY
Start: 2025-03-19

## 2025-04-13 NOTE — PROGRESS NOTES
Subjective:       Patient ID: Elmer Cunha is a 69 y.o. male.    Chief Complaint:      Patient presents today follow-up diabetic evaluation.  Follow-up  Associated symptoms include arthralgias and joint swelling.   Nail Problem  Associated symptoms include arthralgias and joint swelling.   Foot Pain  Associated symptoms include arthralgias and joint swelling.   Ingrown Toenail  Associated symptoms include arthralgias and joint swelling.     Review of Systems   Musculoskeletal:  Positive for arthralgias, gait problem and joint swelling.   All other systems reviewed and are negative.      Objective:      Physical Exam  Vitals and nursing note reviewed.   Constitutional:       Appearance: Normal appearance. He is well-developed.   Cardiovascular:      Rate and Rhythm: Normal rate and regular rhythm.      Pulses:           Dorsalis pedis pulses are 1+ on the right side and 1+ on the left side.        Posterior tibial pulses are 1+ on the right side and 1+ on the left side.      Heart sounds: Normal heart sounds.   Pulmonary:      Effort: Pulmonary effort is normal.      Breath sounds: Normal breath sounds.   Feet:      Right foot:      Protective Sensation: 4 sites tested.  2 sites sensed.      Toenail Condition: Right toenails are abnormally thick. Fungal disease present.     Left foot:      Protective Sensation: 4 sites tested.  2 sites sensed.      Toenail Condition: Left toenails are abnormally thick. Fungal disease present.  Skin:     General: Skin is warm.      Capillary Refill: Capillary refill takes 2 to 3 seconds.   Neurological:      Mental Status: He is alert.      Sensory: Sensory deficit present.   Psychiatric:         Behavior: Behavior normal.         Thought Content: Thought content normal.         Judgment: Judgment normal.                                                                                     Assessment:       1. Ingrown nail    2. Type 2 diabetes mellitus without complication, without  long-term current use of insulin    3. Comprehensive diabetic foot examination, type 2 DM, encounter for        Plan:                 Patient presents today for follow-up diabetic evaluation.  Since I saw the patient last the patient has had back surgery he relates that they had to fuse several discs in his back he was unable to move his legs and they became very weak he states it was at that point he really did not have a lot of choice but to go ahead with the procedure.  Patient states he has been very pleased with the outcome of the surgery he feels that he is getting stronger he is doing a lot better and he states he is not having the pain he was prior to surgery.  Patient is being seen for follow-up diabetic evaluation and to address chronically ingrown toenails.  Patient had significantly incurvated ingrowing toenails medial lateral border bilateral hallux this required debridement of the ingrowing nail areas were not currently infected but were inflamed patient did have discomfort in these areas all previous surgical sites well healed and pain-free at this time.  I was able to successfully remove the ingrowing nail gave the patient considerable relief.  Comprehensive diabetic evaluation performed today patient does have a history of previous ulceration and skin breakdown areas of previous bone spur formation have been surgically address the patient states he is very pleased with the surgery these areas are no longer an issue however there is other areas that need to be monitored closely because he is at high risk.  Recommended follow-up is 2-3 months because of the chronically ingrown nails the patient has any problems questions or concerns sooner he is immediately.  Patient states he is very pleased with the surgeries on both feet which have relieved a lot of the pain he was previously having.   This note was created using [employer] voice recognition software that occasionally misinterpreted phrases or  words.

## 2025-05-22 ENCOUNTER — OFFICE VISIT (OUTPATIENT)
Dept: PODIATRY | Facility: CLINIC | Age: 70
End: 2025-05-22
Payer: MEDICARE

## 2025-05-22 VITALS
HEIGHT: 71 IN | HEART RATE: 89 BPM | BODY MASS INDEX: 29.69 KG/M2 | DIASTOLIC BLOOD PRESSURE: 77 MMHG | SYSTOLIC BLOOD PRESSURE: 117 MMHG | WEIGHT: 212.06 LBS

## 2025-05-22 DIAGNOSIS — L60.0 INGROWN NAIL: Primary | ICD-10-CM

## 2025-05-22 DIAGNOSIS — E11.9 COMPREHENSIVE DIABETIC FOOT EXAMINATION, TYPE 2 DM, ENCOUNTER FOR: ICD-10-CM

## 2025-05-22 DIAGNOSIS — M19.079 OSTEOARTHRITIS OF ANKLE AND FOOT, UNSPECIFIED LATERALITY: ICD-10-CM

## 2025-05-22 DIAGNOSIS — E11.9 TYPE 2 DIABETES MELLITUS WITHOUT COMPLICATION, WITHOUT LONG-TERM CURRENT USE OF INSULIN: ICD-10-CM

## 2025-05-22 DIAGNOSIS — R26.81 UNSTEADY GAIT: ICD-10-CM

## 2025-05-22 PROCEDURE — 99213 OFFICE O/P EST LOW 20 MIN: CPT | Mod: S$PBB,,, | Performed by: PODIATRIST

## 2025-05-22 PROCEDURE — 99215 OFFICE O/P EST HI 40 MIN: CPT | Mod: PBBFAC,PN | Performed by: PODIATRIST

## 2025-05-22 PROCEDURE — 99999 PR PBB SHADOW E&M-EST. PATIENT-LVL V: CPT | Mod: PBBFAC,,, | Performed by: PODIATRIST

## 2025-05-22 RX ORDER — TIZANIDINE 2 MG/1
2 TABLET ORAL EVERY 8 HOURS
COMMUNITY
Start: 2025-05-02

## 2025-05-22 NOTE — PROGRESS NOTES
Subjective:       Patient ID: Elmer Cunha is a 69 y.o. male.    Chief Complaint:      Patient presents today follow-up diabetic evaluation.  Follow-up  Associated symptoms include arthralgias and joint swelling.   Nail Problem  Associated symptoms include arthralgias and joint swelling.   Foot Pain  Associated symptoms include arthralgias and joint swelling.   Ingrown Toenail  Associated symptoms include arthralgias and joint swelling.     Review of Systems   Musculoskeletal:  Positive for arthralgias, gait problem and joint swelling.   All other systems reviewed and are negative.      Objective:      Physical Exam  Vitals and nursing note reviewed.   Constitutional:       Appearance: Normal appearance. He is well-developed.   Cardiovascular:      Rate and Rhythm: Normal rate and regular rhythm.      Pulses:           Dorsalis pedis pulses are 1+ on the right side and 1+ on the left side.        Posterior tibial pulses are 1+ on the right side and 1+ on the left side.      Heart sounds: Normal heart sounds.   Pulmonary:      Effort: Pulmonary effort is normal.      Breath sounds: Normal breath sounds.   Feet:      Right foot:      Protective Sensation: 4 sites tested.  2 sites sensed.      Toenail Condition: Right toenails are abnormally thick. Fungal disease present.     Left foot:      Protective Sensation: 4 sites tested.  2 sites sensed.      Toenail Condition: Left toenails are abnormally thick. Fungal disease present.  Skin:     General: Skin is warm.      Capillary Refill: Capillary refill takes 2 to 3 seconds.   Neurological:      Mental Status: He is alert.      Sensory: Sensory deficit present.   Psychiatric:         Behavior: Behavior normal.         Thought Content: Thought content normal.         Judgment: Judgment normal.                                                                                                   Assessment:       1. Ingrown nail    2. Type 2 diabetes mellitus without  complication, without long-term current use of insulin    3. Comprehensive diabetic foot examination, type 2 DM, encounter for    4. Osteoarthritis of ankle and foot, unspecified laterality    5. Unsteady gait        Plan:                 Patient presents today for follow-up diabetic evaluation.  Since I saw the patient last the patient has had back surgery he relates that they had to fuse several discs in his back he was unable to move his legs and they became very weak he states it was at that point he really did not have a lot of choice but to go ahead with the procedure.  Patient states he has been very pleased with the outcome of the surgery he feels that he is getting stronger he is doing a lot better and he states he is not having the pain he was prior to surgery.  Patient is being seen for follow-up diabetic evaluation and to address chronically ingrown toenails.  Patient had significantly incurvated ingrowing toenails medial lateral border bilateral hallux this required debridement of the ingrowing nail areas were not currently infected but were inflamed patient did have discomfort in these areas all previous surgical sites well healed and pain-free at this time.  I was able to successfully remove the ingrowing nail gave the patient considerable relief.  Comprehensive diabetic evaluation performed today patient does have a history of previous ulceration and skin breakdown areas of previous bone spur formation have been surgically address the patient states he is very pleased with the surgery these areas are no longer an issue however there is other areas that need to be monitored closely because he is at high risk.  Recommended follow-up is 2-3 months because of the chronically ingrown nails the patient has any problems questions or concerns sooner he is immediately.  Patient states he is very pleased with the surgeries on both feet which have relieved a lot of the pain he was previously having.  Patient  states he has had a different is shoe he has had some difficulty swallowing and states he is going to be following up with his primary care regarding this.  This note was created using [employer] voice recognition software that occasionally misinterpreted phrases or words.

## 2025-06-05 ENCOUNTER — OFFICE VISIT (OUTPATIENT)
Dept: OTOLARYNGOLOGY | Facility: CLINIC | Age: 70
End: 2025-06-05
Payer: MEDICARE

## 2025-06-05 VITALS — WEIGHT: 196 LBS | BODY MASS INDEX: 27.44 KG/M2 | HEIGHT: 71 IN

## 2025-06-05 DIAGNOSIS — J31.2 CHRONIC SORE THROAT: ICD-10-CM

## 2025-06-05 DIAGNOSIS — R13.13 PHARYNGEAL DYSPHAGIA: Primary | ICD-10-CM

## 2025-06-05 DIAGNOSIS — R49.0 HOARSENESS: ICD-10-CM

## 2025-06-05 PROCEDURE — 87186 SC STD MICRODIL/AGAR DIL: CPT | Performed by: OTOLARYNGOLOGY

## 2025-06-05 PROCEDURE — 99213 OFFICE O/P EST LOW 20 MIN: CPT | Mod: PBBFAC,PN | Performed by: OTOLARYNGOLOGY

## 2025-06-05 PROCEDURE — 99999 PR PBB SHADOW E&M-EST. PATIENT-LVL III: CPT | Mod: PBBFAC,,, | Performed by: OTOLARYNGOLOGY

## 2025-06-05 RX ORDER — AZITHROMYCIN 250 MG/1
250 TABLET, FILM COATED ORAL
COMMUNITY
Start: 2025-06-02

## 2025-06-07 LAB
BACTERIA SPEC AEROBE CULT: ABNORMAL
BACTERIA SPEC AEROBE CULT: ABNORMAL

## 2025-06-09 ENCOUNTER — RESULTS FOLLOW-UP (OUTPATIENT)
Dept: OTOLARYNGOLOGY | Facility: CLINIC | Age: 70
End: 2025-06-09

## 2025-06-09 ENCOUNTER — TELEPHONE (OUTPATIENT)
Dept: OTOLARYNGOLOGY | Facility: CLINIC | Age: 70
End: 2025-06-09
Payer: MEDICARE

## 2025-06-09 RX ORDER — CEFUROXIME AXETIL 500 MG/1
500 TABLET ORAL 2 TIMES DAILY
Qty: 28 TABLET | Refills: 0 | Status: SHIPPED | OUTPATIENT
Start: 2025-06-09 | End: 2025-06-23

## 2025-06-09 RX ORDER — FLUCONAZOLE 100 MG/1
100 TABLET ORAL DAILY
Qty: 10 TABLET | Refills: 0 | Status: SHIPPED | OUTPATIENT
Start: 2025-06-09 | End: 2025-06-19

## 2025-06-09 NOTE — PROGRESS NOTES
So this patient does not use the portal please call them and tell him that the culture that I would did of his throat grew out two different agents that could be causing all of this thick material in his throat and I am going to treat both of them.  One of them is a bacteria and the other is Candida so I am sending in two medicines to address both of those findings and I forget if we have a return visit but I probably should check him again and a few weeks

## 2025-06-09 NOTE — TELEPHONE ENCOUNTER
Copied from CRM #[id number]. Topic: General Inquiry - Patient Advice  >> Jun 9, 2025  2:04 PM Janel wrote:  Type:  Needs Medical Advice    Who Called: pt       Would the patient rather a call back or a response via [employer]? Call     Best Call Back Number: [phone]    Additional Information: pt called tospeak with keith he is in a lot of distress

## 2025-06-09 NOTE — TELEPHONE ENCOUNTER
"Patient called back wanting to know if "those test show cancer."     I informed the patient that the nasal swab we collected was for bacteria and fungal test and in no way tested for cancer. He is requesting for us to do so and I informed him that would need to be a conversation at his follow up visit with the physician as I am not in liberty to give advice about that nor do I determine the need for that sort of testing. He stated, " yes wandy."   "

## 2025-06-11 ENCOUNTER — TELEPHONE (OUTPATIENT)
Dept: OTOLARYNGOLOGY | Facility: CLINIC | Age: 70
End: 2025-06-11
Payer: MEDICARE

## 2025-06-11 NOTE — TELEPHONE ENCOUNTER
"Copied from CRM #[id number]. Topic: General Inquiry - Patient Advice  >> Jun 11, 2025  2:23 PM Kristy wrote:  Type: Needs Medical Advice    Who Called: PT  Best Call Back Number: [phone]  Additional  Information: PT would like to speak with Dr. Solares, regarding his test results said he is really worried and would like clarification,  Please Advise- Thank you    Called the Patient and he's concerned about the bacteria being " serious ".    I explained to him that Per Dr Solares, " the bacteria you're growing is common bacteria for the sinus cavity and once you've completed all the antibiotics we can re examine you and go from there."   He thanked me for explaining and confirmed the fo/up appt on July 2.  "

## 2025-06-24 ENCOUNTER — OFFICE VISIT (OUTPATIENT)
Dept: OTOLARYNGOLOGY | Facility: CLINIC | Age: 70
End: 2025-06-24
Payer: MEDICARE

## 2025-06-24 VITALS — BODY MASS INDEX: 27.44 KG/M2 | WEIGHT: 196 LBS | HEIGHT: 71 IN

## 2025-06-24 DIAGNOSIS — J31.2 CHRONIC SORE THROAT: ICD-10-CM

## 2025-06-24 DIAGNOSIS — H93.8X3 CONGESTION OF BOTH EARS: Primary | ICD-10-CM

## 2025-06-24 PROCEDURE — 99213 OFFICE O/P EST LOW 20 MIN: CPT | Mod: S$PBB,,, | Performed by: OTOLARYNGOLOGY

## 2025-06-24 PROCEDURE — 99999 PR PBB SHADOW E&M-EST. PATIENT-LVL III: CPT | Mod: PBBFAC,,, | Performed by: OTOLARYNGOLOGY

## 2025-06-24 PROCEDURE — 99213 OFFICE O/P EST LOW 20 MIN: CPT | Mod: PBBFAC,PN | Performed by: OTOLARYNGOLOGY

## 2025-06-24 NOTE — PROGRESS NOTES
"Subjective:       Patient ID: Elmer Cunha is a 69 y.o. male.    Chief Complaint: Ear Problem (Patient here with c/o " stopped up and I feel like its wax build up." )      I saw this gentleman recently with a bacterial pharyngitis that also grew some Candida he was having dysphagia treated that and he tells me that did a lot better but just this morning he woke up and was having trouble hearing the television and thought he might have not wax buildup his swallowing is doing much better          Objective:      ENT Physical Exam    So that is no wax collection on either side in either ear canal no evidence of infection his eardrums look fine, that is no clear evidence of an effusion or other abnormality     His nasal exam looks good and he is denying sinus or nasal problems     His oropharynx looks good and he is scheduled just in a week or so for a follow up visit for his pharyngitis we are just going to wait till then to do a scope exam so that I can also discuss with them if his ears are feeling back to normal or not        Assessment:       1. Congestion of both ears    2. Chronic sore throat         Plan:          I guess he may have had patulous Eustachian tubes for some reason but his ear exam looks good and he tells me that is actually improved some through the day the feeling that he could not hear normally and it was symmetric and I do not see any abnormality         "

## 2025-07-02 ENCOUNTER — OFFICE VISIT (OUTPATIENT)
Dept: OTOLARYNGOLOGY | Facility: CLINIC | Age: 70
End: 2025-07-02
Payer: MEDICARE

## 2025-07-02 VITALS — BODY MASS INDEX: 27.44 KG/M2 | HEIGHT: 71 IN | WEIGHT: 196 LBS

## 2025-07-02 DIAGNOSIS — R49.0 HOARSENESS: Primary | ICD-10-CM

## 2025-07-02 PROCEDURE — 99213 OFFICE O/P EST LOW 20 MIN: CPT | Mod: PBBFAC,PN | Performed by: OTOLARYNGOLOGY

## 2025-07-02 PROCEDURE — 99999 PR PBB SHADOW E&M-EST. PATIENT-LVL III: CPT | Mod: PBBFAC,,, | Performed by: OTOLARYNGOLOGY

## 2025-07-02 PROCEDURE — 99213 OFFICE O/P EST LOW 20 MIN: CPT | Mod: S$PBB,,, | Performed by: OTOLARYNGOLOGY

## 2025-07-02 NOTE — PROGRESS NOTES
"Subjective:       Patient ID: Elmer Cunha is a 69 y.o. male.    Chief Complaint: Follow-up (Patient here to follow up on recent visits. He states his throat an voice are much better. He states, " my ears had a little wax in them. I put peroxide on a qtip and twisted it around in there and cleaned it out." )      This gentleman returns I saw him about three weeks ago and he has a sore throat mucus in his throat some hoarseness when I scoped him he had all kinds of material adherent to the surfaces I grew out a Klebsiella and a type of Candida species we treated him for both and he tells me he is much better.          Objective:      ENT Physical Exam    His oropharynx looks normal but on our last exam the oropharynx did not have much involvement     An indirect laryngeal exam was performed this time and I got a good enough view into his base of tongue hypopharynx and larynx to at least determine that there was no obvious residual exudate or white film which was just copious an obvious on our last visit.            Assessment:       1. Hoarseness         Plan:          He is doing much better his exam looks pretty good the look I got today was brief but I do not think we need more than that to determine that the obvious infected material has resolved at least two examination and that is going to let me know if this symptoms of sore throat mucus feeling adherent to the surfaces hoarseness and trouble swallowing returns to any degree        "

## 2025-07-08 ENCOUNTER — TELEPHONE (OUTPATIENT)
Dept: PODIATRY | Facility: CLINIC | Age: 70
End: 2025-07-08
Payer: MEDICARE

## 2025-07-10 ENCOUNTER — OFFICE VISIT (OUTPATIENT)
Dept: PODIATRY | Facility: CLINIC | Age: 70
End: 2025-07-10
Payer: MEDICARE

## 2025-07-10 VITALS
BODY MASS INDEX: 27.44 KG/M2 | WEIGHT: 196 LBS | SYSTOLIC BLOOD PRESSURE: 129 MMHG | DIASTOLIC BLOOD PRESSURE: 78 MMHG | HEART RATE: 93 BPM | HEIGHT: 71 IN

## 2025-07-10 DIAGNOSIS — E11.9 COMPREHENSIVE DIABETIC FOOT EXAMINATION, TYPE 2 DM, ENCOUNTER FOR: ICD-10-CM

## 2025-07-10 DIAGNOSIS — E11.9 TYPE 2 DIABETES MELLITUS WITHOUT COMPLICATION, WITHOUT LONG-TERM CURRENT USE OF INSULIN: ICD-10-CM

## 2025-07-10 DIAGNOSIS — L60.0 INGROWN NAIL: Primary | ICD-10-CM

## 2025-07-10 PROCEDURE — 99214 OFFICE O/P EST MOD 30 MIN: CPT | Mod: PBBFAC,PN | Performed by: PODIATRIST

## 2025-07-10 PROCEDURE — 99999 PR PBB SHADOW E&M-EST. PATIENT-LVL IV: CPT | Mod: PBBFAC,,, | Performed by: PODIATRIST

## 2025-07-10 PROCEDURE — 99213 OFFICE O/P EST LOW 20 MIN: CPT | Mod: S$PBB,,, | Performed by: PODIATRIST

## 2025-07-10 NOTE — PROGRESS NOTES
Subjective:       Patient ID: Elmer Cunha is a 69 y.o. male.    Chief Complaint:      Patient presents today follow-up diabetic evaluation.  Follow-up  Associated symptoms include arthralgias and joint swelling.   Nail Problem  Associated symptoms include arthralgias and joint swelling.   Foot Pain  Associated symptoms include arthralgias and joint swelling.   Ingrown Toenail  Associated symptoms include arthralgias and joint swelling.     Review of Systems   Musculoskeletal:  Positive for arthralgias, gait problem and joint swelling.   All other systems reviewed and are negative.      Objective:      Physical Exam  Vitals and nursing note reviewed.   Constitutional:       Appearance: Normal appearance. He is well-developed.   Cardiovascular:      Rate and Rhythm: Normal rate and regular rhythm.      Pulses:           Dorsalis pedis pulses are 1+ on the right side and 1+ on the left side.        Posterior tibial pulses are 1+ on the right side and 1+ on the left side.      Heart sounds: Normal heart sounds.   Pulmonary:      Effort: Pulmonary effort is normal.      Breath sounds: Normal breath sounds.   Feet:      Right foot:      Protective Sensation: 4 sites tested.  2 sites sensed.      Toenail Condition: Right toenails are abnormally thick. Fungal disease present.     Left foot:      Protective Sensation: 4 sites tested.  2 sites sensed.      Toenail Condition: Left toenails are abnormally thick. Fungal disease present.  Skin:     General: Skin is warm.      Capillary Refill: Capillary refill takes 2 to 3 seconds.   Neurological:      Mental Status: He is alert.      Sensory: Sensory deficit present.   Psychiatric:         Behavior: Behavior normal.         Thought Content: Thought content normal.         Judgment: Judgment normal.                                                                                                         Assessment:       1. Ingrown nail    2. Type 2 diabetes mellitus without  complication, without long-term current use of insulin    3. Comprehensive diabetic foot examination, type 2 DM, encounter for        Plan:                 Patient presents today for follow-up diabetic evaluation.  Since I saw the patient last the patient has had back surgery he relates that they had to fuse several discs in his back he was unable to move his legs and they became very weak he states it was at that point he really did not have a lot of choice but to go ahead with the procedure.  Patient states he has been very pleased with the outcome of the surgery he feels that he is getting stronger he is doing a lot better and he states he is not having the pain he was prior to surgery.  Patient is being seen for follow-up diabetic evaluation and to address chronically ingrown toenails.  Patient had significantly incurvated ingrowing toenails medial lateral border bilateral hallux this required debridement of the ingrowing nail areas were not currently infected but were inflamed patient did have discomfort in these areas all previous surgical sites well healed and pain-free at this time.  I was able to successfully remove the ingrowing nail gave the patient considerable relief.  Comprehensive diabetic evaluation performed today patient does have a history of previous ulceration and skin breakdown areas of previous bone spur formation have been surgically address the patient states he is very pleased with the surgery these areas are no longer an issue however there is other areas that need to be monitored closely because he is at high risk.  Recommended follow-up is 2-3 months because of the chronically ingrown nails the patient has any problems questions or concerns sooner he is immediately.  Patient states he is very pleased with the surgeries on both feet which have relieved a lot of the pain he was previously having.  Patient continues to recover following previous back surgery.  This note was created using  [employer] voice recognition software that occasionally misinterpreted phrases or words.

## 2025-08-21 ENCOUNTER — OFFICE VISIT (OUTPATIENT)
Dept: PODIATRY | Facility: CLINIC | Age: 70
End: 2025-08-21
Payer: MEDICARE

## 2025-08-21 VITALS
SYSTOLIC BLOOD PRESSURE: 136 MMHG | HEIGHT: 71 IN | HEART RATE: 68 BPM | DIASTOLIC BLOOD PRESSURE: 84 MMHG | BODY MASS INDEX: 27.44 KG/M2 | WEIGHT: 196 LBS

## 2025-08-21 DIAGNOSIS — E11.9 COMPREHENSIVE DIABETIC FOOT EXAMINATION, TYPE 2 DM, ENCOUNTER FOR: ICD-10-CM

## 2025-08-21 DIAGNOSIS — E11.9 TYPE 2 DIABETES MELLITUS WITHOUT COMPLICATION, WITHOUT LONG-TERM CURRENT USE OF INSULIN: ICD-10-CM

## 2025-08-21 DIAGNOSIS — L60.0 INGROWN NAIL: Primary | ICD-10-CM

## 2025-08-21 DIAGNOSIS — R26.81 UNSTEADY GAIT: ICD-10-CM

## 2025-08-21 PROCEDURE — 99214 OFFICE O/P EST MOD 30 MIN: CPT | Mod: PBBFAC,PN | Performed by: PODIATRIST

## 2025-08-21 PROCEDURE — 99213 OFFICE O/P EST LOW 20 MIN: CPT | Mod: S$PBB,,, | Performed by: PODIATRIST

## 2025-08-21 PROCEDURE — 99999 PR PBB SHADOW E&M-EST. PATIENT-LVL IV: CPT | Mod: PBBFAC,,, | Performed by: PODIATRIST

## (undated) DEVICE — BURR EGG SHAPE 4X8MM

## (undated) DEVICE — NDL ECLIPSE SAFETY 18GX1-1/2IN

## (undated) DEVICE — PAD ABD 8X10 STERILE

## (undated) DEVICE — SYR B-D DISP CONTROL 10CC100/C

## (undated) DEVICE — SUT 4-0 VICRYL / SH

## (undated) DEVICE — DRESSING N ADH OIL EMUL 3X3

## (undated) DEVICE — TAPE CASTING 4 X 4YDS WHT

## (undated) DEVICE — GAUZE SPONGE 4X4 12PLY

## (undated) DEVICE — BLADE SURG #15 CARBON STEEL

## (undated) DEVICE — COVER EQUIP 36X12 W/ELSTC BAND

## (undated) DEVICE — BURR BONE MICRO 6X9.5X44.5MM

## (undated) DEVICE — DRAPE T EXTRM SURG 121X128X90

## (undated) DEVICE — SPONGE DERMACEA GAUZE 4X4

## (undated) DEVICE — NDL HYPO REG 25G X 1 1/2

## (undated) DEVICE — SOL IRRI STRL WATER 1000ML

## (undated) DEVICE — PACK ELITE MINIVIEW DRAPE

## (undated) DEVICE — LABEL BLANK SURG 10 PER SHEET

## (undated) DEVICE — SUT ETHILON 4-0 PS2 18 BLK

## (undated) DEVICE — DRAPE STERI INSTRUMENT 1018

## (undated) DEVICE — SUT MONO 3-0 PS-2 18 PLST

## (undated) DEVICE — KIT ENDO CARRY ON PROCEDURE

## (undated) DEVICE — TOURNIQUET SB QC SP 18X4IN

## (undated) DEVICE — SEE MEDLINE ITEM 146298

## (undated) DEVICE — STRIP STERI REIN CLSR 1/2X2IN

## (undated) DEVICE — SOL 9P NACL IRR PIC IL

## (undated) DEVICE — BANDAGE MATRIX HK LOOP 4IN 5YD

## (undated) DEVICE — SUT 3-0 VICRYL / SH (J416)

## (undated) DEVICE — SEE MEDLINE ITEM 146270

## (undated) DEVICE — SUT ETHILON 4-0 BLK MONO

## (undated) DEVICE — SHOE POST-OP VEL CLOS M/LG

## (undated) DEVICE — COVER FOOT SWITCH UNIVERSAL

## (undated) DEVICE — BLADE SAW MED NAR 18.0X5.5MM

## (undated) DEVICE — NDL HYPODERMIC BLUNT 18G 1.5IN

## (undated) DEVICE — SLEEVE SCD EXPRESS CALF LARGE

## (undated) DEVICE — BANDAGE ESMARK 6X12

## (undated) DEVICE — TOURNIQUET SB QC SP 15X2.5IN

## (undated) DEVICE — BANDAGE ESMARK ELASTIC ST 4X9

## (undated) DEVICE — DILATOR CRE 10-12MM

## (undated) DEVICE — CANISTER SUCTION 3000CC

## (undated) DEVICE — ELECTRODE REM PLYHSV RETURN 9

## (undated) DEVICE — SUT MONOCRYL 4-0 PS-2

## (undated) DEVICE — SUT BONE WAX 2.5 GRMS 12/BX

## (undated) DEVICE — NDL ECLIPSE SAFETY 25GX1IN

## (undated) DEVICE — NDL 18GA

## (undated) DEVICE — GLOVE SURG ULTRA TOUCH 7

## (undated) DEVICE — SPLINT FIBERGLASS PAD 6X15

## (undated) DEVICE — GLOVE PROTEXIS PI SYN SURG 6.5

## (undated) DEVICE — DRAPE MINI C ARM STERILE

## (undated) DEVICE — GLOVE BIOGEL PI ORTHO PRO 7.5

## (undated) DEVICE — PAD SUREFIT GRND ELECTRD 10FT

## (undated) DEVICE — UNDERGLOVES BIOGEL PI SZ 7 LF

## (undated) DEVICE — SYR 30CC LUER LOCK

## (undated) DEVICE — PAD PREPS ALCOHOL 2-PLY LARGE

## (undated) DEVICE — SYR SLIP TIP 5CC

## (undated) DEVICE — BLADE SAGITTAL 31MMX10MMX.38MM

## (undated) DEVICE — Device

## (undated) DEVICE — SEE MEDLINE ITEM 156964

## (undated) DEVICE — GLOVE SURGEONS ULTRA TOUCH 6.5

## (undated) DEVICE — PADDING CAST 4IN SPECIALIST

## (undated) DEVICE — SEE MEDLINE ITEM 152522

## (undated) DEVICE — SOL NACL IRR 1000ML BTL

## (undated) DEVICE — NDL SAFETY 25G X 1.5 ECLIPSE

## (undated) DEVICE — SPONGE LAP 18X18 PREWASHED

## (undated) DEVICE — CLOSURE SKIN STERI STRIP 1/4X4